# Patient Record
Sex: FEMALE | Race: WHITE | NOT HISPANIC OR LATINO | Employment: FULL TIME | URBAN - METROPOLITAN AREA
[De-identification: names, ages, dates, MRNs, and addresses within clinical notes are randomized per-mention and may not be internally consistent; named-entity substitution may affect disease eponyms.]

---

## 2019-01-10 ENCOUNTER — TELEPHONE (OUTPATIENT)
Dept: SCHEDULING | Facility: IMAGING CENTER | Age: 34
End: 2019-01-10

## 2019-01-13 ENCOUNTER — HOSPITAL ENCOUNTER (OUTPATIENT)
Facility: MEDICAL CENTER | Age: 34
End: 2019-01-13
Attending: PHYSICIAN ASSISTANT
Payer: COMMERCIAL

## 2019-01-13 ENCOUNTER — OFFICE VISIT (OUTPATIENT)
Dept: URGENT CARE | Facility: CLINIC | Age: 34
End: 2019-01-13
Payer: COMMERCIAL

## 2019-01-13 VITALS
HEART RATE: 78 BPM | DIASTOLIC BLOOD PRESSURE: 78 MMHG | RESPIRATION RATE: 20 BRPM | WEIGHT: 145 LBS | BODY MASS INDEX: 23.3 KG/M2 | HEIGHT: 66 IN | SYSTOLIC BLOOD PRESSURE: 120 MMHG | TEMPERATURE: 98.3 F | OXYGEN SATURATION: 96 %

## 2019-01-13 DIAGNOSIS — N76.0 ACUTE VAGINITIS: ICD-10-CM

## 2019-01-13 LAB
APPEARANCE UR: NORMAL
BILIRUB UR STRIP-MCNC: NORMAL MG/DL
COLOR UR AUTO: YELLOW
GLUCOSE UR STRIP.AUTO-MCNC: NORMAL MG/DL
INT CON NEG: NORMAL
INT CON POS: NORMAL
KETONES UR STRIP.AUTO-MCNC: 40 MG/DL
LEUKOCYTE ESTERASE UR QL STRIP.AUTO: NORMAL
NITRITE UR QL STRIP.AUTO: NORMAL
PH UR STRIP.AUTO: 5.5 [PH] (ref 5–8)
POC URINE PREGNANCY TEST: NEGATIVE
PROT UR QL STRIP: NORMAL MG/DL
RBC UR QL AUTO: NORMAL
SP GR UR STRIP.AUTO: 1.02
UROBILINOGEN UR STRIP-MCNC: 0.2 MG/DL

## 2019-01-13 PROCEDURE — 87480 CANDIDA DNA DIR PROBE: CPT

## 2019-01-13 PROCEDURE — 87591 N.GONORRHOEAE DNA AMP PROB: CPT

## 2019-01-13 PROCEDURE — 87491 CHLMYD TRACH DNA AMP PROBE: CPT

## 2019-01-13 PROCEDURE — 81025 URINE PREGNANCY TEST: CPT | Performed by: PHYSICIAN ASSISTANT

## 2019-01-13 PROCEDURE — 87660 TRICHOMONAS VAGIN DIR PROBE: CPT

## 2019-01-13 PROCEDURE — 87510 GARDNER VAG DNA DIR PROBE: CPT

## 2019-01-13 PROCEDURE — 99284 EMERGENCY DEPT VISIT MOD MDM: CPT

## 2019-01-13 PROCEDURE — 81002 URINALYSIS NONAUTO W/O SCOPE: CPT | Performed by: PHYSICIAN ASSISTANT

## 2019-01-13 PROCEDURE — 99204 OFFICE O/P NEW MOD 45 MIN: CPT | Performed by: PHYSICIAN ASSISTANT

## 2019-01-13 ASSESSMENT — ENCOUNTER SYMPTOMS
HEADACHES: 0
CONSTIPATION: 0
CHILLS: 0
ANOREXIA: 0
VAGINITIS: 1
FLANK PAIN: 0
COUGH: 0
NAUSEA: 0
VOMITING: 0
ABDOMINAL PAIN: 0
BACK PAIN: 0
SHORTNESS OF BREATH: 0
FEVER: 0

## 2019-01-14 ENCOUNTER — APPOINTMENT (OUTPATIENT)
Dept: RADIOLOGY | Facility: MEDICAL CENTER | Age: 34
End: 2019-01-14
Attending: EMERGENCY MEDICINE
Payer: COMMERCIAL

## 2019-01-14 ENCOUNTER — HOSPITAL ENCOUNTER (EMERGENCY)
Facility: MEDICAL CENTER | Age: 34
End: 2019-01-14
Attending: EMERGENCY MEDICINE
Payer: COMMERCIAL

## 2019-01-14 VITALS
BODY MASS INDEX: 25.2 KG/M2 | OXYGEN SATURATION: 96 % | WEIGHT: 151.24 LBS | HEIGHT: 65 IN | RESPIRATION RATE: 20 BRPM | DIASTOLIC BLOOD PRESSURE: 74 MMHG | SYSTOLIC BLOOD PRESSURE: 130 MMHG | HEART RATE: 105 BPM | TEMPERATURE: 99.1 F

## 2019-01-14 DIAGNOSIS — N93.9 ABNORMAL VAGINAL BLEEDING: ICD-10-CM

## 2019-01-14 DIAGNOSIS — R10.32 ABDOMINAL PAIN, LLQ (LEFT LOWER QUADRANT): ICD-10-CM

## 2019-01-14 DIAGNOSIS — K59.00 CONSTIPATION, UNSPECIFIED CONSTIPATION TYPE: ICD-10-CM

## 2019-01-14 DIAGNOSIS — N83.201 RIGHT OVARIAN CYST: ICD-10-CM

## 2019-01-14 DIAGNOSIS — N76.0 ACUTE VAGINITIS: ICD-10-CM

## 2019-01-14 PROCEDURE — 76856 US EXAM PELVIC COMPLETE: CPT

## 2019-01-14 PROCEDURE — 700102 HCHG RX REV CODE 250 W/ 637 OVERRIDE(OP): Performed by: EMERGENCY MEDICINE

## 2019-01-14 PROCEDURE — 74018 RADEX ABDOMEN 1 VIEW: CPT

## 2019-01-14 PROCEDURE — A9270 NON-COVERED ITEM OR SERVICE: HCPCS | Performed by: EMERGENCY MEDICINE

## 2019-01-14 RX ORDER — KETOROLAC TROMETHAMINE 10 MG/1
10 TABLET, FILM COATED ORAL 3 TIMES DAILY PRN
Qty: 15 TAB | Refills: 0 | Status: ON HOLD | OUTPATIENT
Start: 2019-01-14 | End: 2022-11-09

## 2019-01-14 RX ORDER — ACETAMINOPHEN 325 MG/1
1000 TABLET ORAL ONCE
Status: COMPLETED | OUTPATIENT
Start: 2019-01-14 | End: 2019-01-14

## 2019-01-14 RX ADMIN — ACETAMINOPHEN 975 MG: 325 TABLET, FILM COATED ORAL at 03:07

## 2019-01-14 RX ADMIN — IBUPROFEN 800 MG: 600 TABLET ORAL at 03:07

## 2019-01-14 ASSESSMENT — PAIN SCALES - GENERAL: PAINLEVEL_OUTOF10: 7

## 2019-01-14 NOTE — PROGRESS NOTES
Subjective:      Brooke Crespo is a 33 y.o. female who presents with Vaginal Discharge (Almost a week vaginal itching and discharge)            Vaginitis   The patient's primary symptoms include genital itching, vaginal bleeding (mild spotting ) and vaginal discharge (yellow ). The patient's pertinent negatives include no genital lesions, genital odor, genital rash, missed menses or pelvic pain. This is a new problem. Episode onset: 1 week  The problem occurs constantly. The problem has been unchanged. The patient is experiencing no pain. She is not pregnant. Associated symptoms include dysuria (mild dysuria ). Pertinent negatives include no abdominal pain, anorexia, back pain, chills, constipation, discolored urine, fever, flank pain, frequency, headaches, hematuria, nausea, painful intercourse, rash, urgency or vomiting. The vaginal discharge was milky and yellow. The vaginal bleeding is spotting. Nothing aggravates the symptoms. She has tried nothing for the symptoms. She is sexually active. No, her partner does not have an STD. She uses nothing for contraception. Her past medical history is significant for vaginosis.     Patient has history of recurrent Yeast infections and UTIs. She states the only medication that has worked in the past for her yeast infections is Butoconazole. She states he has resistance to other creams and Diflucan.    History reviewed. No pertinent past medical history.    History reviewed. No pertinent surgical history.    History reviewed. No pertinent family history.    No Known Allergies    Medications, Allergies, and current problem list reviewed today in Epic      Review of Systems   Constitutional: Negative for chills, fever and malaise/fatigue.   Respiratory: Negative for cough and shortness of breath.    Gastrointestinal: Negative for abdominal pain, anorexia, constipation, nausea and vomiting.   Genitourinary: Positive for dysuria (mild dysuria ) and vaginal discharge  "(yellow ). Negative for flank pain, frequency, hematuria, missed menses, pelvic pain and urgency.        Vaginal discharge, spotting, vaginal itching   Musculoskeletal: Negative for back pain.   Skin: Negative for rash.   Neurological: Negative for headaches.     All other systems reviewed and are negative.        Objective:     /78 (BP Location: Left arm, Patient Position: Sitting, BP Cuff Size: Adult)   Pulse 78   Temp 36.8 °C (98.3 °F) (Temporal)   Resp 20   Ht 1.664 m (5' 5.5\")   Wt 65.8 kg (145 lb)   LMP 12/31/2018 (Exact Date)   SpO2 96%   Breastfeeding? No   BMI 23.76 kg/m²      Physical Exam   Constitutional: She is oriented to person, place, and time. She appears well-developed and well-nourished. No distress.   HENT:   Head: Normocephalic and atraumatic.   Eyes: Conjunctivae are normal.   Pulmonary/Chest: Effort normal. No respiratory distress.   Genitourinary: Uterus normal. There is no rash, tenderness or lesion on the right labia. There is no rash, tenderness or lesion on the left labia. Cervix exhibits discharge and friability (mild friability ). Right adnexum displays no mass, no tenderness and no fullness. Left adnexum displays no mass, no tenderness and no fullness. There is erythema and bleeding (mild spotting ) in the vagina. No tenderness in the vagina. No foreign body in the vagina. Vaginal discharge found.   Neurological: She is alert and oriented to person, place, and time. No cranial nerve deficit.   Skin: Skin is warm and dry. No rash noted.   Psychiatric: She has a normal mood and affect. Her behavior is normal. Judgment and thought content normal.        UA- negative  HCG- negative         Assessment/Plan:     1. Acute vaginitis    - POCT Urinalysis  - POCT Pregnancy  - VAGINAL PATHOGENS DNA PANEL; Future  - CHLAMYDIA/GC PCR URINE OR SWAB; Future    Will check above and change treatment plan accordingly.     Differential diagnoses, Supportive care, and indications for " immediate follow-up discussed with patient.   Instructed to return to clinic or nearest emergency department for any change in condition, further concerns, or worsening of symptoms.    The patient demonstrated a good understanding and agreed with the treatment plan.    Hoa Ye P.A.-C.

## 2019-01-14 NOTE — DISCHARGE INSTRUCTIONS
Call today to schedule an appointment with a gynecologist.  Take the medications as directed with food  Return if uncontrolled bleeding.

## 2019-01-14 NOTE — ED PROVIDER NOTES
"CHIEF COMPLAINT  Chief Complaint   Patient presents with   • Abdominal Pain     Pt c/o L pelvic pain, and red abnormal bleeding, for about 2 weeks.  Pt was seen at urgent care today.   • Vaginal Bleeding       HPI  Brokoe Crespo is a 33 y.o. female who presents tonight with chief complaint of left lower quadrant abdominal pain that she describes as crampy in nature, abnormal vaginal bleeding 1 time per day with bright red blood for the last 2 weeks.  Patient states that she had a normal menstrual cycle at the first of this month that lasted approximately 6 days.  She denies any dysuria, hematuria, fever, chills, nausea, vomiting, diarrhea.  She states she does suffer from chronic constipation.    REVIEW OF SYSTEMS  See HPI for further details. All other system reviews are negative.    PAST MEDICAL HISTORY  History reviewed. No pertinent past medical history.    FAMILY HISTORY  History reviewed. No pertinent family history.    SOCIAL HISTORY  Social History     Social History   • Marital status: Single     Spouse name: N/A   • Number of children: N/A   • Years of education: N/A     Social History Main Topics   • Smoking status: Never Smoker   • Smokeless tobacco: Never Used   • Alcohol use Not on file   • Drug use: Unknown   • Sexual activity: Not on file     Other Topics Concern   • Not on file     Social History Narrative   • No narrative on file       SURGICAL HISTORY  History reviewed. No pertinent surgical history.    CURRENT MEDICATIONS  See nurse's note    ALLERGIES  No Known Allergies    PHYSICAL EXAM  VITAL SIGNS: /74   Pulse 100   Temp 37.3 °C (99.1 °F)   Resp 20   Ht 1.651 m (5' 5\")   Wt 68.6 kg (151 lb 3.8 oz)   LMP 12/31/2018 (Exact Date)   SpO2 98%   BMI 25.17 kg/m²     Constitutional: Patient is well developed, well nourished in moderate distress from her abdominal pain.  HENT: Normocephalic,  Oropharynx moist , nose normal with no mucosal edema.   Eyes: PERRL, EOMI, Conjunctiva " without erythema.   Neck: Supple with Normal range of motion in flexion, extension and lateral rotation. No tenderness along the bony prominences or paraspinal muscles.  Lymphatic: No lymphadenopathy noted.   Cardiovascular: Normal heart rate and rhythm. No murmur.  Thorax & Lungs: Clear and equal breath sounds with good excursion. No respiratory distress.  Abdomen: Bowel sounds normal in all four quadrants. Soft with left lower quadrant tenderness upon palpation, intentional guarding, no rebound, no flank tenderness.  Her abdomen is somewhat distended but no masses are palpated.  Skin: Warm, Dry, No rashes.   Back: No cervical, thoracic, or lumbosacral tenderness.   Extremities: Peripheral pulses 4/4 No edema, No tenderness, normal range of motion.  Neurologic: Alert & oriented x 3, Normal motor function, Normal sensory function.  Psychiatric: Affect normal, Judgment normal, Mood normal.       RADIOLOGY/PROCEDURES  US-PELVIC COMPLETE (TRANSABDOMINAL/TRANSVAGINAL) (COMBO)   Final Result      1.  Small amount of fluid within the lower uterine segment/endocervix.   2.  Minimal free fluid in the cul-de-sac.   3.  Small right ovarian cyst measuring up to 2.3 cm. Free fluid may have arisen from partial cyst rupture.      [id number] VIEW   Final Result      1.  Negative plain film abdomen series.            COURSE & MEDICAL DECISION MAKING  Pertinent Labs & Imaging studies reviewed. (See chart for details)  KUB performed shows increased stool with no obstructive bowel gas pattern.  Pelvic ultrasound was performed to rule out ovarian cyst, patient's urinalysis and pregnancy test and outpatient today were both negative.  She did have vaginal cultures performed with the results of that are not available at this time.  Pelvic ultrasound reveals a small amount of free fluid with a small right ovarian cyst of up to 2.3 cm.  For the free fluid may have come from this cyst.  I have given the patient Motrin and Tylenol here in  the ER and a prescription for Toradol for home.  She is to follow-up with gynecology and was given referral numbers.  She will be discharged in stable and improved condition to return if uncontrolled bleeding or worsening pain.  She is stable.    FINAL IMPRESSION  1.  Left lower quadrant abdominal pain  2.  Constipation  3.  Abnormal vaginal bleeding         Electronically signed by: Yessi Allison, 1/14/2019 1:39 [facility] Provider Note

## 2019-01-14 NOTE — ED TRIAGE NOTES
"Chief Complaint   Patient presents with   • Abdominal Pain     Pt c/o L pelvic pain, and red abnormal bleeding, for about 2 weeks.  Pt was seen at urgent care today.   • Vaginal Bleeding     /74   Pulse 100   Temp 37.3 °C (99.1 °F)   Resp 20   Ht 1.651 m (5' 5\")   Wt 68.6 kg (151 lb 3.8 oz)   LMP 12/31/2018 (Exact Date)   SpO2 98%   BMI 25.17 kg/m²     "

## 2019-01-15 LAB
C TRACH DNA SPEC QL NAA+PROBE: NEGATIVE
CANDIDA DNA VAG QL PROBE+SIG AMP: NEGATIVE
G VAGINALIS DNA VAG QL PROBE+SIG AMP: NEGATIVE
N GONORRHOEA DNA SPEC QL NAA+PROBE: NEGATIVE
SPECIMEN SOURCE: NORMAL
T VAGINALIS DNA VAG QL PROBE+SIG AMP: NEGATIVE

## 2019-01-17 ENCOUNTER — TELEPHONE (OUTPATIENT)
Dept: URGENT CARE | Facility: CLINIC | Age: 34
End: 2019-01-17

## 2019-02-21 ENCOUNTER — APPOINTMENT (OUTPATIENT)
Dept: MEDICAL GROUP | Facility: MEDICAL CENTER | Age: 34
End: 2019-02-21
Payer: COMMERCIAL

## 2019-05-04 ENCOUNTER — APPOINTMENT (OUTPATIENT)
Dept: RADIOLOGY | Facility: MEDICAL CENTER | Age: 34
End: 2019-05-04
Attending: EMERGENCY MEDICINE
Payer: COMMERCIAL

## 2019-05-04 ENCOUNTER — HOSPITAL ENCOUNTER (EMERGENCY)
Facility: MEDICAL CENTER | Age: 34
End: 2019-05-04
Attending: EMERGENCY MEDICINE
Payer: COMMERCIAL

## 2019-05-04 VITALS
SYSTOLIC BLOOD PRESSURE: 135 MMHG | OXYGEN SATURATION: 98 % | HEIGHT: 65 IN | HEART RATE: 90 BPM | WEIGHT: 145 LBS | TEMPERATURE: 97.9 F | RESPIRATION RATE: 18 BRPM | DIASTOLIC BLOOD PRESSURE: 70 MMHG | BODY MASS INDEX: 24.16 KG/M2

## 2019-05-04 DIAGNOSIS — S16.1XXA STRAIN OF NECK MUSCLE, INITIAL ENCOUNTER: ICD-10-CM

## 2019-05-04 DIAGNOSIS — S09.90XA CLOSED HEAD INJURY, INITIAL ENCOUNTER: ICD-10-CM

## 2019-05-04 PROCEDURE — 700102 HCHG RX REV CODE 250 W/ 637 OVERRIDE(OP): Performed by: EMERGENCY MEDICINE

## 2019-05-04 PROCEDURE — 72125 CT NECK SPINE W/O DYE: CPT

## 2019-05-04 PROCEDURE — 99284 EMERGENCY DEPT VISIT MOD MDM: CPT

## 2019-05-04 PROCEDURE — A9270 NON-COVERED ITEM OR SERVICE: HCPCS | Performed by: EMERGENCY MEDICINE

## 2019-05-04 PROCEDURE — 70450 CT HEAD/BRAIN W/O DYE: CPT

## 2019-05-04 RX ORDER — ACETAMINOPHEN 325 MG/1
650 TABLET ORAL ONCE
Status: COMPLETED | OUTPATIENT
Start: 2019-05-04 | End: 2019-05-04

## 2019-05-04 RX ADMIN — ACETAMINOPHEN 650 MG: 325 TABLET, FILM COATED ORAL at 08:09

## 2019-05-04 NOTE — ED NOTES
Pt discharged to home.  Pt given discharge paperwork and all applicable prescriptions written by provider.  Pt to follow with PCP, when indicated.  Pt verbalizes understanding of discharge teaching and will return to ED if condition worsens.
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
## 2019-05-04 NOTE — ED NOTES
RN contacted emergency contact per patient request. Contact is aware and unable to come in at this time.
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
## 2019-08-01 ENCOUNTER — OFFICE VISIT (OUTPATIENT)
Dept: URGENT CARE | Facility: CLINIC | Age: 34
End: 2019-08-01
Payer: COMMERCIAL

## 2019-08-01 VITALS
TEMPERATURE: 98.7 F | HEIGHT: 65 IN | WEIGHT: 145 LBS | RESPIRATION RATE: 16 BRPM | OXYGEN SATURATION: 97 % | BODY MASS INDEX: 24.16 KG/M2 | HEART RATE: 92 BPM | DIASTOLIC BLOOD PRESSURE: 70 MMHG | SYSTOLIC BLOOD PRESSURE: 108 MMHG

## 2019-08-01 DIAGNOSIS — J04.0 LARYNGITIS: ICD-10-CM

## 2019-08-01 DIAGNOSIS — J01.40 SUBACUTE PANSINUSITIS: ICD-10-CM

## 2019-08-01 LAB
INT CON NEG: NORMAL
INT CON POS: NORMAL
S PYO AG THROAT QL: NEGATIVE

## 2019-08-01 PROCEDURE — 87880 STREP A ASSAY W/OPTIC: CPT | Performed by: PHYSICIAN ASSISTANT

## 2019-08-01 PROCEDURE — 99214 OFFICE O/P EST MOD 30 MIN: CPT | Performed by: PHYSICIAN ASSISTANT

## 2019-08-01 RX ORDER — AZITHROMYCIN 250 MG/1
TABLET, FILM COATED ORAL
Qty: 6 TAB | Refills: 0 | Status: SHIPPED | OUTPATIENT
Start: 2019-08-01 | End: 2020-12-30

## 2019-08-01 RX ORDER — PREDNISONE 20 MG/1
20 TABLET ORAL DAILY
Qty: 3 TAB | Refills: 0 | Status: ON HOLD | OUTPATIENT
Start: 2019-08-01 | End: 2022-11-09

## 2019-08-01 ASSESSMENT — ENCOUNTER SYMPTOMS
CHILLS: 0
SORE THROAT: 1
CONSTIPATION: 0
HEADACHES: 1
MYALGIAS: 0
DIZZINESS: 0
HOARSE VOICE: 1
ABDOMINAL PAIN: 0
EYE PAIN: 0
SWOLLEN GLANDS: 1
PALPITATIONS: 0
FEVER: 0
COUGH: 0
SHORTNESS OF BREATH: 0
TROUBLE SWALLOWING: 1
NAUSEA: 0
BLURRED VISION: 0
VOMITING: 0
DIARRHEA: 0

## 2019-08-01 ASSESSMENT — PATIENT HEALTH QUESTIONNAIRE - PHQ9: CLINICAL INTERPRETATION OF PHQ2 SCORE: 0

## 2019-08-02 NOTE — PROGRESS NOTES
Subjective:      Brooke Crespo is a 34 y.o. female who presents with Pharyngitis ( sarted with ear infection, painful, hard time talking, runny nose, x5 days)      Pharyngitis    This is a new problem. The current episode started 1 to 4 weeks ago (Symptoms started just over 1 week ago). The problem has been waxing and waning. The pain is worse on the left side. There has been no fever. The pain is moderate. Associated symptoms include congestion, headaches, a hoarse voice, a plugged ear sensation, swollen glands and trouble swallowing. Pertinent negatives include no abdominal pain, coughing, diarrhea, ear discharge, shortness of breath or vomiting. She has had no exposure to strep or mono. She has tried NSAIDs and acetaminophen (Nasal sprays, oral decongestants) for the symptoms. The treatment provided no relief.       Review of Systems   Constitutional: Positive for malaise/fatigue. Negative for chills and fever.   HENT: Positive for congestion, hoarse voice, sore throat and trouble swallowing. Negative for ear discharge.    Eyes: Negative for blurred vision and pain.   Respiratory: Negative for cough and shortness of breath.    Cardiovascular: Negative for chest pain and palpitations.   Gastrointestinal: Negative for abdominal pain, constipation, diarrhea, nausea and vomiting.   Musculoskeletal: Negative for myalgias.   Skin: Negative for rash.   Neurological: Positive for headaches. Negative for dizziness.   Endo/Heme/Allergies: Positive for environmental allergies.       PMH:  has no past medical history on file.  MEDS:   Current Outpatient Medications:   •  Oxymetazoline HCl (NASAL SPRAY NA), Spray  in nose., Disp: , Rfl:   •  predniSONE (DELTASONE) 20 MG Tab, Take 1 Tab by mouth every day., Disp: 3 Tab, Rfl: 0  •  azithromycin (ZITHROMAX) 250 MG Tab, Take two tabs po day one followed by one tab po day two through five with food, Disp: 6 Tab, Rfl: 0  •  ketorolac (TORADOL) 10 MG Tab, Take 1 Tab by mouth  "3 times a day as needed for Moderate Pain. (Patient not taking: Reported on 8/1/2019), Disp: 15 Tab, Rfl: 0  ALLERGIES: No Known Allergies  SURGHX: No past surgical history on file.  SOCHX:  reports that she has never smoked. She has never used smokeless tobacco. She reports that she drinks alcohol.  FH: Family history was reviewed, no pertinent findings to report     Objective:     /70 (BP Location: Left arm, Patient Position: Sitting, BP Cuff Size: Adult)   Pulse 92   Temp 37.1 °C (98.7 °F) (Temporal)   Resp 16   Ht 1.651 m (5' 5\")   Wt 65.8 kg (145 lb)   SpO2 97%   BMI 24.13 kg/m²      Physical Exam   Constitutional: She is oriented to person, place, and time. She appears well-developed and well-nourished.   HENT:   Head: Normocephalic and atraumatic.   Right Ear: Tympanic membrane, external ear and ear canal normal.   Left Ear: Tympanic membrane, external ear and ear canal normal.   Nose: Mucosal edema and rhinorrhea present.   Mouth/Throat: Uvula is midline and mucous membranes are normal. Posterior oropharyngeal erythema present.   Eyes: Pupils are equal, round, and reactive to light. Conjunctivae are normal.   Neck: Normal range of motion.   Cardiovascular: Normal rate, regular rhythm and normal heart sounds.   No murmur heard.  Pulmonary/Chest: Effort normal and breath sounds normal. No accessory muscle usage. No respiratory distress. She has no decreased breath sounds. She has no wheezes. She has no rhonchi. She has no rales.   Lymphadenopathy:     She has cervical adenopathy.   Neurological: She is alert and oriented to person, place, and time.   Skin: Skin is warm and dry. Capillary refill takes less than 2 seconds.   Psychiatric: She has a normal mood and affect. Her behavior is normal. Judgment normal.   Vitals reviewed.      POCT Rapid Strep A - Negative     Assessment/Plan:     1. Laryngitis  - predniSONE (DELTASONE) 20 MG Tab; Take 1 Tab by mouth every day.  Dispense: 3 Tab; Refill: 0  - " azithromycin (ZITHROMAX) 250 MG Tab; Take two tabs po day one followed by one tab po day two through five with food  Dispense: 6 Tab; Refill: 0  - POCT Rapid Strep A    2. Subacute pansinusitis  - predniSONE (DELTASONE) 20 MG Tab; Take 1 Tab by mouth every day.  Dispense: 3 Tab; Refill: 0          Differential Diagnosis, natural history, and supportive care discussed. Return to the Urgent Care or follow up with your PCP if symptoms fail to resolve, or for any new or worsening symptoms. Emergency room precautions discussed. Patient and/or family appears understanding of information.

## 2019-09-09 ENCOUNTER — OFFICE VISIT (OUTPATIENT)
Dept: URGENT CARE | Facility: CLINIC | Age: 34
End: 2019-09-09
Payer: COMMERCIAL

## 2019-09-09 ENCOUNTER — HOSPITAL ENCOUNTER (OUTPATIENT)
Facility: MEDICAL CENTER | Age: 34
End: 2019-09-09
Attending: NURSE PRACTITIONER
Payer: COMMERCIAL

## 2019-09-09 VITALS
DIASTOLIC BLOOD PRESSURE: 80 MMHG | SYSTOLIC BLOOD PRESSURE: 120 MMHG | TEMPERATURE: 98.2 F | HEIGHT: 65 IN | HEART RATE: 84 BPM | BODY MASS INDEX: 24.13 KG/M2

## 2019-09-09 DIAGNOSIS — Z20.2 POSSIBLE EXPOSURE TO STD: ICD-10-CM

## 2019-09-09 PROCEDURE — 87491 CHLMYD TRACH DNA AMP PROBE: CPT

## 2019-09-09 PROCEDURE — 87660 TRICHOMONAS VAGIN DIR PROBE: CPT

## 2019-09-09 PROCEDURE — 87591 N.GONORRHOEAE DNA AMP PROB: CPT

## 2019-09-09 PROCEDURE — 87510 GARDNER VAG DNA DIR PROBE: CPT

## 2019-09-09 PROCEDURE — 99214 OFFICE O/P EST MOD 30 MIN: CPT | Performed by: NURSE PRACTITIONER

## 2019-09-09 PROCEDURE — 87480 CANDIDA DNA DIR PROBE: CPT

## 2019-09-09 NOTE — PROGRESS NOTES
Subjective:      Brooke Crespo is a 34 y.o. female who presents with Exposure to STD    History reviewed. No pertinent past medical history.  Social History     Socioeconomic History   • Marital status: Single     Spouse name: Not on file   • Number of children: Not on file   • Years of education: Not on file   • Highest education level: Not on file   Occupational History   • Not on file   Social Needs   • Financial resource strain: Not on file   • Food insecurity:     Worry: Not on file     Inability: Not on file   • Transportation needs:     Medical: Not on file     Non-medical: Not on file   Tobacco Use   • Smoking status: Never Smoker   • Smokeless tobacco: Never Used   Substance and Sexual Activity   • Alcohol use: Yes     Comment: soc   • Drug use: Not on file   • Sexual activity: Not on file   Lifestyle   • Physical activity:     Days per week: Not on file     Minutes per session: Not on file   • Stress: Not on file   Relationships   • Social connections:     Talks on phone: Not on file     Gets together: Not on file     Attends Latter day service: Not on file     Active member of club or organization: Not on file     Attends meetings of clubs or organizations: Not on file     Relationship status: Not on file   • Intimate partner violence:     Fear of current or ex partner: Not on file     Emotionally abused: Not on file     Physically abused: Not on file     Forced sexual activity: Not on file   Other Topics Concern   • Not on file   Social History Narrative   • Not on file     History reviewed. No pertinent family history.    Allergies: Patient has no known allergies.    Patient is 34-year-old female who presents with request for testing for STDs.  No known exposure, states she is not having symptoms at this time.        Exposure to STD   This is a new problem. The problem occurs constantly. The problem has been unchanged. Nothing aggravates the symptoms. She has tried nothing for the symptoms. The  "treatment provided no relief.       Review of Systems   All other systems reviewed and are negative.         Objective:     /80   Pulse 84   Temp 36.8 °C (98.2 °F) (Temporal)   Ht 1.651 m (5' 5\")   BMI 24.13 kg/m²      Physical Exam   Constitutional: She is oriented to person, place, and time. She appears well-developed and well-nourished.   Cardiovascular: Normal rate and regular rhythm.   Abdominal: There is no tenderness.   Musculoskeletal: Normal range of motion.   Neurological: She is alert and oriented to person, place, and time.   Skin: Skin is warm and dry.   Psychiatric: She has a normal mood and affect. Her behavior is normal. Judgment and thought content normal.   Vitals reviewed.              Assessment/Plan:     1. Possible exposure to STD    Culture was obtained for gonorrhea and chlamydia  Vaginal pathogens obtained  Lab orders given for RPR, HIV, and HSV  We will notify upon receiving results  Call or return otherwise for any further questions or concerns    "

## 2019-09-10 ENCOUNTER — HOSPITAL ENCOUNTER (OUTPATIENT)
Dept: LAB | Facility: MEDICAL CENTER | Age: 34
End: 2019-09-10
Attending: NURSE PRACTITIONER
Payer: COMMERCIAL

## 2019-09-10 DIAGNOSIS — Z20.2 POSSIBLE EXPOSURE TO STD: ICD-10-CM

## 2019-09-10 LAB
C TRACH DNA SPEC QL NAA+PROBE: NEGATIVE
CANDIDA DNA VAG QL PROBE+SIG AMP: NEGATIVE
G VAGINALIS DNA VAG QL PROBE+SIG AMP: POSITIVE
HIV 1+2 AB+HIV1 P24 AG SERPL QL IA: NON REACTIVE
N GONORRHOEA DNA SPEC QL NAA+PROBE: NEGATIVE
SPECIMEN SOURCE: NORMAL
T VAGINALIS DNA VAG QL PROBE+SIG AMP: NEGATIVE
TREPONEMA PALLIDUM IGG+IGM AB [PRESENCE] IN SERUM OR PLASMA BY IMMUNOASSAY: NON REACTIVE

## 2019-09-10 PROCEDURE — 86780 TREPONEMA PALLIDUM: CPT

## 2019-09-10 PROCEDURE — 87389 HIV-1 AG W/HIV-1&-2 AB AG IA: CPT

## 2019-09-10 PROCEDURE — 86696 HERPES SIMPLEX TYPE 2 TEST: CPT

## 2019-09-10 PROCEDURE — 36415 COLL VENOUS BLD VENIPUNCTURE: CPT

## 2019-09-10 PROCEDURE — 86695 HERPES SIMPLEX TYPE 1 TEST: CPT

## 2019-09-10 PROCEDURE — 86694 HERPES SIMPLEX NES ANTBDY: CPT

## 2019-09-11 ENCOUNTER — TELEPHONE (OUTPATIENT)
Dept: URGENT CARE | Facility: CLINIC | Age: 34
End: 2019-09-11

## 2019-09-11 DIAGNOSIS — N76.0 BV (BACTERIAL VAGINOSIS): ICD-10-CM

## 2019-09-11 DIAGNOSIS — B96.89 BV (BACTERIAL VAGINOSIS): ICD-10-CM

## 2019-09-11 RX ORDER — METRONIDAZOLE 7.5 MG/G
1 GEL VAGINAL
Qty: 1 TUBE | Refills: 0 | Status: SHIPPED | OUTPATIENT
Start: 2019-09-11 | End: 2019-09-16

## 2019-09-11 NOTE — TELEPHONE ENCOUNTER
Attempted to notify patient by phone of lab results.  No answer.  Left detailed voice message with results.  Vaginal pathogens was positive for bacterial vaginosis.  HIV, RPR, gonorrhea/chlamydia were all negative.  Advised patient also that HSV is still pending.  Requested callback for any further questions or concerns.  Advised patient that I will send a prescription for metronidazole gel to her pharmacy.  Patient was also sent a message via [employer].

## 2019-09-12 LAB
HSV1 GG IGG SER-ACNC: 48.2 IV
HSV1+2 IGG SER IA-ACNC: >22.4 IV
HSV2 GG IGG SER-ACNC: 0.17 IV

## 2019-12-21 ENCOUNTER — OFFICE VISIT (OUTPATIENT)
Dept: URGENT CARE | Facility: CLINIC | Age: 34
End: 2019-12-21
Payer: COMMERCIAL

## 2019-12-21 VITALS
DIASTOLIC BLOOD PRESSURE: 60 MMHG | BODY MASS INDEX: 24.16 KG/M2 | WEIGHT: 145 LBS | HEART RATE: 104 BPM | TEMPERATURE: 98.4 F | OXYGEN SATURATION: 96 % | SYSTOLIC BLOOD PRESSURE: 110 MMHG | RESPIRATION RATE: 16 BRPM | HEIGHT: 65 IN

## 2019-12-21 DIAGNOSIS — R30.0 DYSURIA: ICD-10-CM

## 2019-12-21 DIAGNOSIS — J06.9 URI WITH COUGH AND CONGESTION: ICD-10-CM

## 2019-12-21 LAB
APPEARANCE UR: NORMAL
BILIRUB UR STRIP-MCNC: NORMAL MG/DL
COLOR UR AUTO: YELLOW
FLUAV+FLUBV AG SPEC QL IA: NEGATIVE
GLUCOSE UR STRIP.AUTO-MCNC: NORMAL MG/DL
INT CON NEG: NORMAL
INT CON POS: NORMAL
KETONES UR STRIP.AUTO-MCNC: NORMAL MG/DL
LEUKOCYTE ESTERASE UR QL STRIP.AUTO: NORMAL
NITRITE UR QL STRIP.AUTO: NORMAL
PH UR STRIP.AUTO: 8 [PH] (ref 5–8)
PROT UR QL STRIP: NORMAL MG/DL
RBC UR QL AUTO: NORMAL
SP GR UR STRIP.AUTO: 1.02
UROBILINOGEN UR STRIP-MCNC: 0.2 MG/DL

## 2019-12-21 PROCEDURE — 81002 URINALYSIS NONAUTO W/O SCOPE: CPT | Performed by: PHYSICIAN ASSISTANT

## 2019-12-21 PROCEDURE — 87804 INFLUENZA ASSAY W/OPTIC: CPT | Performed by: PHYSICIAN ASSISTANT

## 2019-12-21 PROCEDURE — 99214 OFFICE O/P EST MOD 30 MIN: CPT | Performed by: PHYSICIAN ASSISTANT

## 2019-12-21 RX ORDER — BENZONATATE 100 MG/1
100 CAPSULE ORAL 3 TIMES DAILY PRN
Qty: 30 CAP | Refills: 0 | Status: ON HOLD | OUTPATIENT
Start: 2019-12-21 | End: 2022-11-09

## 2019-12-21 RX ORDER — AZITHROMYCIN 250 MG/1
TABLET, FILM COATED ORAL
Qty: 6 TAB | Refills: 0 | Status: SHIPPED | OUTPATIENT
Start: 2019-12-21 | End: 2020-12-30

## 2019-12-21 ASSESSMENT — ENCOUNTER SYMPTOMS
SPUTUM PRODUCTION: 1
FLANK PAIN: 0
SINUS PAIN: 1
NAUSEA: 0
EYE PAIN: 0
DIZZINESS: 0
HEADACHES: 1
EYE REDNESS: 0
VOMITING: 0
SHORTNESS OF BREATH: 0
SORE THROAT: 1
ABDOMINAL PAIN: 0
CHILLS: 0
EYE DISCHARGE: 0
WHEEZING: 0
COUGH: 1
FEVER: 0
DIARRHEA: 1
MYALGIAS: 1

## 2019-12-21 NOTE — LETTER
December 21, 2019         Patient: Brooke Crespo   YOB: 1985   Date of Visit: 12/21/2019           To Whom it May Concern:    Brooke Crespo was seen in my clinic on 12/21/2019. Please excuse her from work today.     If you have any questions or concerns, please don't hesitate to call.        Sincerely,           Alicia Rodriguez P.A.-C.  Electronically Signed

## 2019-12-22 NOTE — PROGRESS NOTES
Subjective:      Brooke Crespo is a 34 y.o. female who presents with Sore Throat (x3 days); Sinus Problem; Urinary Frequency (x2 days); and Dysuria            HPI  34-year-old female presents to urgent care with new problem of sore throat, sinus congestion, productive cough and generalized fatigue onset about 4 days ago. Patient has not tried any OTC medications for her symptoms.   Sick contacts at work.   Patient also reports urinary frequency and mild dysuria onset 2 days ago.  She denies hematuria, urinary urgency, flank pain, abdominal pain, vomiting, or fevers.  Denies other associated aggravating or alleviating factors.     Review of Systems   Constitutional: Positive for malaise/fatigue. Negative for chills and fever.   HENT: Positive for congestion, sinus pain and sore throat.    Eyes: Negative for pain, discharge and redness.   Respiratory: Positive for cough and sputum production. Negative for shortness of breath and wheezing.    Cardiovascular: Negative for chest pain.        Chest soreness secondary to cough    Gastrointestinal: Positive for diarrhea. Negative for abdominal pain, nausea and vomiting.   Genitourinary: Positive for dysuria, frequency and urgency. Negative for flank pain and hematuria.   Musculoskeletal: Positive for myalgias.   Skin: Negative for rash.   Neurological: Positive for headaches. Negative for dizziness.   Endo/Heme/Allergies: Negative for environmental allergies.       History reviewed. No pertinent past medical history.  Current Outpatient Medications on File Prior to Visit   Medication Sig Dispense Refill   • clindamycin (CLEOCIN) 2 % vaginal cream Apply once at night for 7 days. 1 Tube 0   • Oxymetazoline HCl (NASAL SPRAY NA) Spray  in nose.     • predniSONE (DELTASONE) 20 MG Tab Take 1 Tab by mouth every day. 3 Tab 0   • azithromycin (ZITHROMAX) 250 MG Tab Take two tabs po day one followed by one tab po day two through five with food 6 Tab 0   • ketorolac (TORADOL)  "10 MG Tab Take 1 Tab by mouth 3 times a day as needed for Moderate Pain. (Patient not taking: Reported on 8/1/2019) 15 Tab 0     No current facility-administered medications on file prior to visit.      Patient has no known allergies.  Social History     Tobacco Use   • Smoking status: Never Smoker   • Smokeless tobacco: Never Used   Substance Use Topics   • Alcohol use: Yes     Comment: soc      Objective:     /60 (BP Location: Right arm, Patient Position: Sitting, BP Cuff Size: Adult)   Pulse (!) 104   Temp 36.9 °C (98.4 °F) (Temporal)   Resp 16   Ht 1.651 m (5' 5\")   Wt 65.8 kg (145 lb)   LMP 12/04/2019 (Exact Date)   SpO2 96%   Breastfeeding? No   BMI 24.13 kg/m²      Physical Exam  Vitals signs reviewed.   Constitutional:       General: She is not in acute distress.     Appearance: Normal appearance. She is well-developed. She is not ill-appearing.   HENT:      Head: Normocephalic and atraumatic.      Right Ear: Tympanic membrane normal.      Left Ear: Tympanic membrane normal.      Nose: Mucosal edema and congestion present. No rhinorrhea.      Mouth/Throat:      Mouth: Mucous membranes are moist.      Pharynx: Posterior oropharyngeal erythema present. No oropharyngeal exudate.   Eyes:      Extraocular Movements: Extraocular movements intact.      Conjunctiva/sclera: Conjunctivae normal.   Neck:      Musculoskeletal: Normal range of motion and neck supple.   Cardiovascular:      Rate and Rhythm: Normal rate and regular rhythm.      Heart sounds: Normal heart sounds.   Pulmonary:      Effort: Pulmonary effort is normal. No respiratory distress.      Breath sounds: Normal breath sounds. No wheezing or rhonchi.   Abdominal:      General: Bowel sounds are normal.      Palpations: Abdomen is soft.      Tenderness: There is no tenderness. There is no right CVA tenderness or left CVA tenderness.   Musculoskeletal: Normal range of motion.   Skin:     General: Skin is warm and dry.      Findings: No " rash.   Neurological:      General: No focal deficit present.      Mental Status: She is alert and oriented to person, place, and time.   Psychiatric:         Mood and Affect: Mood normal.         Behavior: Behavior normal.         Thought Content: Thought content normal.         Judgment: Judgment normal.                 Assessment/Plan:     1. Dysuria  POCT Urinalysis   2. URI with cough and congestion  POCT Influenza A/B    benzonatate (TESSALON) 100 MG Cap    azithromycin (ZITHROMAX) 250 MG Tab     Results for orders placed or performed in visit on 12/21/19   POCT Urinalysis   Result Value Ref Range    POC Color YELLOW Negative    POC Appearance CLOUDY Negative    POC Leukocyte Esterase NEG Negative    POC Nitrites NEG Negative    POC Urobiligen 0.2 Negative (0.2) mg/dL    POC Protein NEG Negative mg/dL    POC Urine PH 8.0 5.0 - 8.0    POC Blood NEG Negative    POC Specific Gravity 1.020 <1.005 - >1.030    POC Ketones NEG Negative mg/dL    POC Bilirubin NEG Negative mg/dL    POC Glucose NEG Negative mg/dL   POCT Influenza A/B   Result Value Ref Range    Rapid Influenza A-B NEGATIVE     Internal Control Positive Valid     Internal Control Negative Valid    Recommend continued use of over-the-counter cold and cough medications for symptomatic relief.  Tylenol and/or Motrin for development of fevers or body aches.  PT should follow up with PCP in 1-2 days for re-evaluation if symptoms have not improved.  Discussed red flags and reasons to return to UC or ED.  Pt and/or family verbalized understanding of diagnosis and follow up instructions and was offered informational handout on diagnosis.  PT discharged.

## 2020-12-30 ENCOUNTER — OFFICE VISIT (OUTPATIENT)
Dept: URGENT CARE | Facility: CLINIC | Age: 35
End: 2020-12-30
Payer: COMMERCIAL

## 2020-12-30 ENCOUNTER — HOSPITAL ENCOUNTER (OUTPATIENT)
Facility: MEDICAL CENTER | Age: 35
End: 2020-12-30
Attending: NURSE PRACTITIONER
Payer: COMMERCIAL

## 2020-12-30 VITALS
SYSTOLIC BLOOD PRESSURE: 110 MMHG | WEIGHT: 146 LBS | OXYGEN SATURATION: 97 % | HEART RATE: 93 BPM | HEIGHT: 65 IN | RESPIRATION RATE: 14 BRPM | TEMPERATURE: 98 F | DIASTOLIC BLOOD PRESSURE: 70 MMHG | BODY MASS INDEX: 24.32 KG/M2

## 2020-12-30 DIAGNOSIS — R10.9 FLANK PAIN: ICD-10-CM

## 2020-12-30 DIAGNOSIS — N12 PYELONEPHRITIS: ICD-10-CM

## 2020-12-30 LAB
APPEARANCE UR: NORMAL
BILIRUB UR STRIP-MCNC: NORMAL MG/DL
COLOR UR AUTO: NORMAL
GLUCOSE UR STRIP.AUTO-MCNC: 100 MG/DL
INT CON NEG: NORMAL
INT CON POS: NORMAL
KETONES UR STRIP.AUTO-MCNC: 15 MG/DL
LEUKOCYTE ESTERASE UR QL STRIP.AUTO: NEGATIVE
NITRITE UR QL STRIP.AUTO: POSITIVE
PH UR STRIP.AUTO: 5.5 [PH] (ref 5–8)
POC URINE PREGNANCY TEST: NEGATIVE
PROT UR QL STRIP: 30 MG/DL
RBC UR QL AUTO: NEGATIVE
SP GR UR STRIP.AUTO: 1.03
UROBILINOGEN UR STRIP-MCNC: 1 MG/DL

## 2020-12-30 PROCEDURE — 99214 OFFICE O/P EST MOD 30 MIN: CPT | Mod: 25 | Performed by: NURSE PRACTITIONER

## 2020-12-30 PROCEDURE — 81025 URINE PREGNANCY TEST: CPT | Performed by: NURSE PRACTITIONER

## 2020-12-30 PROCEDURE — 81002 URINALYSIS NONAUTO W/O SCOPE: CPT | Performed by: NURSE PRACTITIONER

## 2020-12-30 PROCEDURE — 87086 URINE CULTURE/COLONY COUNT: CPT

## 2020-12-30 RX ORDER — SULFAMETHOXAZOLE AND TRIMETHOPRIM 800; 160 MG/1; MG/1
1 TABLET ORAL EVERY 12 HOURS
Qty: 28 TAB | Refills: 0 | Status: SHIPPED | OUTPATIENT
Start: 2020-12-30 | End: 2020-12-30 | Stop reason: SDUPTHER

## 2020-12-30 RX ORDER — SULFAMETHOXAZOLE AND TRIMETHOPRIM 800; 160 MG/1; MG/1
1 TABLET ORAL EVERY 12 HOURS
Qty: 28 TAB | Refills: 0 | Status: SHIPPED | OUTPATIENT
Start: 2020-12-30 | End: 2021-01-13

## 2020-12-30 ASSESSMENT — ENCOUNTER SYMPTOMS
CHILLS: 0
ABDOMINAL PAIN: 1
DIZZINESS: 0
COUGH: 0
FATIGUE: 1
FEVER: 0
SHORTNESS OF BREATH: 0
NAUSEA: 0
FLANK PAIN: 1
VOMITING: 0
SORE THROAT: 0
MYALGIAS: 0
EYE REDNESS: 0

## 2020-12-31 DIAGNOSIS — N12 PYELONEPHRITIS: ICD-10-CM

## 2020-12-31 NOTE — PROGRESS NOTES
Subjective:   Brooke Crespo is a 35 y.o. female who presents for UTI (frequency, painful urination, pain radiating in abdomin and lower back, red irriated skin, has had a ruptured cyst in the past. Has been on two sets of antibiotics)      UTI  This is a new problem. The current episode started in the past 7 days. The problem occurs constantly. The problem has been gradually worsening. Associated symptoms include abdominal pain, fatigue and urinary symptoms. Pertinent negatives include no chest pain, chills, congestion, coughing, fever, myalgias, nausea, rash, sore throat or vomiting. Associated symptoms comments: Flank pain  . Nothing aggravates the symptoms. She has tried acetaminophen (macrobid x5 days) for the symptoms. The treatment provided no relief.       Review of Systems   Constitutional: Positive for fatigue and malaise/fatigue. Negative for chills and fever.   HENT: Negative for congestion and sore throat.    Eyes: Negative for redness.   Respiratory: Negative for cough and shortness of breath.    Cardiovascular: Negative for chest pain.   Gastrointestinal: Positive for abdominal pain. Negative for nausea and vomiting.   Genitourinary: Positive for dysuria and flank pain. Negative for frequency, hematuria and urgency.   Musculoskeletal: Negative for myalgias.   Skin: Negative for rash.   Neurological: Negative for dizziness.       Medications:    • benzonatate Caps  • clindamycin  • ketorolac Tabs  • NASAL SPRAY NA  • predniSONE Tabs  • sulfamethoxazole-trimethoprim    Allergies: Patient has no known allergies.    Problem List: Brooke Cresop does not have a problem list on file.    Surgical History:  Past Surgical History:   Procedure Laterality Date   • LUMPECTOMY     • SEPTAL RECONSTRUCTION         Past Social Hx: Brooke Crespo  reports that she has never smoked. She has never used smokeless tobacco. She reports current alcohol use.     Past Family Hx:  Brooke Crespo family  "history is not on file.     Problem list, medications, and allergies reviewed by myself today in Epic.     Objective:     /70   Pulse 93   Temp 36.7 °C (98 °F) (Temporal)   Resp 14   Ht 1.651 m (5' 5\")   Wt 66.2 kg (146 lb)   SpO2 97%   BMI 24.30 kg/m²     Physical Exam  Vitals signs and nursing note reviewed.   Constitutional:       General: She is not in acute distress.     Appearance: She is well-developed.   HENT:      Head: Normocephalic and atraumatic.      Right Ear: External ear normal.      Left Ear: External ear normal.      Nose: Nose normal.      Mouth/Throat:      Mouth: Mucous membranes are moist.   Eyes:      Conjunctiva/sclera: Conjunctivae normal.   Cardiovascular:      Rate and Rhythm: Normal rate.   Pulmonary:      Effort: Pulmonary effort is normal. No respiratory distress.      Breath sounds: Normal breath sounds.   Abdominal:      General: Bowel sounds are normal. There is no distension.      Tenderness: There is no abdominal tenderness. There is right CVA tenderness and left CVA tenderness.   Musculoskeletal: Normal range of motion.   Skin:     General: Skin is warm and dry.   Neurological:      General: No focal deficit present.      Mental Status: She is alert and oriented to person, place, and time. Mental status is at baseline.      Gait: Gait (gait at baseline) normal.   Psychiatric:         Judgment: Judgment normal.         Assessment/Plan:     Diagnosis and associated orders:     1. Pyelonephritis  POCT Urinalysis    POCT PREGNANCY    Urine Culture    REFERRAL TO UROLOGY    cefTRIAXone (ROCEPHIN) 1 g, lidocaine (XYLOCAINE) 1 % 3.6 mL for IM use    sulfamethoxazole-trimethoprim (BACTRIM DS) 800-160 MG tablet    DISCONTINUED: sulfamethoxazole-trimethoprim (BACTRIM DS) 800-160 MG tablet   2. Flank pain  CT-RENAL COLIC EVALUATION(A/P W/O)      Comments/MDM:     Results for orders placed or performed in visit on 12/30/20   POCT Urinalysis   Result Value Ref Range    POC Color " Red Negative    POC Appearance Cloudy Negative    POC Leukocyte Esterase Negative Negative    POC Nitrites Positive Negative    POC Urobiligen 1.0 Negative (0.2) mg/dL    POC Protein 30 Negative mg/dL    POC Urine PH 5.5 5.0 - 8.0    POC Blood Negative Negative    POC Specific Gravity 1.030 <1.005 - >1.030    POC Ketones 15 Negative mg/dL    POC Bilirubin Small Negative mg/dL    POC Glucose 100 Negative mg/dL   POCT PREGNANCY   Result Value Ref Range    POC Urine Pregnancy Test Negative Negative    Internal Control Positive Valid     Internal Control Negative Valid  •     •   Patient is a 35-year-old female present with the stated above.  Patient vital signs stable, patient without a fever, no signs of sepsis at this time.  Patient does have bilateral flank tenderness I am concerned of an infection however will obtain diagnostic CT to ensure no kidney stone present.  Due to the time and location CT is unavailable.  Patient will be scheduled in the morning for CT scan for follow-up we will follow-up with pending results and treat as indicated.  Patient will be started on antibiotic therapy today.  Will send urine for culture to ensure no resistance.Differential diagnosis, natural history, supportive care, and indications for immediate follow-up discussed.        Please note that this dictation was created using voice recognition software. I have made a reasonable attempt to correct obvious errors, but I expect that there are errors of grammar and possibly content that I did not discover before finalizing the note.    This note was electronically signed by Harry ABDUL.

## 2021-01-03 ENCOUNTER — OFFICE VISIT (OUTPATIENT)
Dept: URGENT CARE | Facility: CLINIC | Age: 36
End: 2021-01-03
Payer: COMMERCIAL

## 2021-01-03 ENCOUNTER — HOSPITAL ENCOUNTER (OUTPATIENT)
Facility: MEDICAL CENTER | Age: 36
End: 2021-01-03
Attending: NURSE PRACTITIONER
Payer: COMMERCIAL

## 2021-01-03 VITALS
OXYGEN SATURATION: 98 % | HEART RATE: 93 BPM | DIASTOLIC BLOOD PRESSURE: 86 MMHG | RESPIRATION RATE: 16 BRPM | SYSTOLIC BLOOD PRESSURE: 122 MMHG | TEMPERATURE: 98.5 F

## 2021-01-03 DIAGNOSIS — R39.9 LOWER URINARY TRACT SYMPTOMS (LUTS): ICD-10-CM

## 2021-01-03 DIAGNOSIS — Z11.3 SCREEN FOR STD (SEXUALLY TRANSMITTED DISEASE): ICD-10-CM

## 2021-01-03 DIAGNOSIS — N94.9 VAGINAL BURNING: ICD-10-CM

## 2021-01-03 LAB
APPEARANCE UR: CLEAR
BACTERIA UR CULT: NORMAL
BILIRUB UR STRIP-MCNC: NEGATIVE MG/DL
COLOR UR AUTO: NORMAL
GLUCOSE UR STRIP.AUTO-MCNC: NEGATIVE MG/DL
KETONES UR STRIP.AUTO-MCNC: NEGATIVE MG/DL
LEUKOCYTE ESTERASE UR QL STRIP.AUTO: NEGATIVE
NITRITE UR QL STRIP.AUTO: POSITIVE
PH UR STRIP.AUTO: 5.5 [PH] (ref 5–8)
PROT UR QL STRIP: NEGATIVE MG/DL
RBC UR QL AUTO: NORMAL
SIGNIFICANT IND 70042: NORMAL
SITE SITE: NORMAL
SOURCE SOURCE: NORMAL
SP GR UR STRIP.AUTO: 1.03
UROBILINOGEN UR STRIP-MCNC: 0.2 MG/DL

## 2021-01-03 PROCEDURE — 87480 CANDIDA DNA DIR PROBE: CPT

## 2021-01-03 PROCEDURE — 81002 URINALYSIS NONAUTO W/O SCOPE: CPT | Performed by: NURSE PRACTITIONER

## 2021-01-03 PROCEDURE — 87529 HSV DNA AMP PROBE: CPT | Mod: 91

## 2021-01-03 PROCEDURE — 87086 URINE CULTURE/COLONY COUNT: CPT

## 2021-01-03 PROCEDURE — 87660 TRICHOMONAS VAGIN DIR PROBE: CPT

## 2021-01-03 PROCEDURE — 87510 GARDNER VAG DNA DIR PROBE: CPT

## 2021-01-03 PROCEDURE — 99214 OFFICE O/P EST MOD 30 MIN: CPT | Performed by: NURSE PRACTITIONER

## 2021-01-03 RX ORDER — PHENAZOPYRIDINE HYDROCHLORIDE 100 MG/1
100 TABLET, FILM COATED ORAL 3 TIMES DAILY
Status: ON HOLD | COMMUNITY
Start: 2020-12-14 | End: 2022-11-09

## 2021-01-03 RX ORDER — FLUCONAZOLE 150 MG/1
150 TABLET ORAL DAILY
Qty: 1 TAB | Refills: 1 | Status: ON HOLD | OUTPATIENT
Start: 2021-01-03 | End: 2022-11-09

## 2021-01-03 RX ORDER — NITROFURANTOIN 25; 75 MG/1; MG/1
100 CAPSULE ORAL
Status: ON HOLD | COMMUNITY
Start: 2020-12-14 | End: 2022-11-09

## 2021-01-03 RX ORDER — VALACYCLOVIR HYDROCHLORIDE 1 G/1
1000 TABLET, FILM COATED ORAL 2 TIMES DAILY
Qty: 20 TAB | Refills: 0 | Status: ON HOLD | OUTPATIENT
Start: 2021-01-03 | End: 2022-11-09

## 2021-01-03 RX ORDER — FLUCONAZOLE 150 MG/1
TABLET ORAL
Status: ON HOLD | COMMUNITY
Start: 2020-12-31 | End: 2022-11-09

## 2021-01-03 ASSESSMENT — ENCOUNTER SYMPTOMS
ABDOMINAL PAIN: 0
NAUSEA: 0
FEVER: 0
BACK PAIN: 1
CHILLS: 0

## 2021-01-03 NOTE — PROGRESS NOTES
Subjective:      Brooke Crespo is a 35 y.o. female who presents with Urinary Pain (not feeling better since last visit, burning on skin around vaginal area moslty left side)            HPI Recurrent. 35 year old female with urinary pain, vaginal burning for the past 2 weeks. She has been seen twice with negative urine culture from the 30th; none done in first visit. She still reports back pain as well. Denies fever, chills, discharge frequency or urgency. She denies any itching or known lesions. She is sexually active with one partner x 6 months without condom use. She did have positive HSV 1 test result in 2019.     [place]  Current Outpatient Medications on File Prior to Visit   Medication Sig Dispense Refill   • nitrofurantoin (MACROBID) 100 MG Cap Take 100 mg by mouth.     • phenazopyridine (PYRIDIUM) 100 MG Tab Take 100 mg by mouth 3 times a day.     • fluconazole (DIFLUCAN) 150 MG tablet TAKE 1 TABLET BY MOUTH NOW REPEAT DOSE IN 3 DAYS     • sulfamethoxazole-trimethoprim (BACTRIM DS) 800-160 MG tablet Take 1 Tab by mouth every 12 hours for 14 days. (Patient not taking: Reported on 1/3/2021) 28 Tab 0   • benzonatate (TESSALON) 100 MG Cap Take 1 Cap by mouth 3 times a day as needed for Cough. (Patient not taking: Reported on 1/3/2021) 30 Cap 0   • clindamycin (CLEOCIN) 2 % vaginal cream Apply once at night for 7 days. (Patient not taking: Reported on 1/3/2021) 1 Tube 0   • Oxymetazoline HCl (NASAL SPRAY NA) Spray  in nose.     • predniSONE (DELTASONE) 20 MG Tab Take 1 Tab by mouth every day. (Patient not taking: Reported on 1/3/2021) 3 Tab 0   • ketorolac (TORADOL) 10 MG Tab Take 1 Tab by mouth 3 times a day as needed for Moderate Pain. (Patient not taking: Reported on 1/3/2021) 15 Tab 0     No current facility-administered medications on file prior to visit.      Social History     Socioeconomic History   • Marital status: Single     Spouse name: Not on file   • Number of children: Not on file   •  Years of education: Not on file   • Highest education level: Not on file   Occupational History   • Not on file   Social Needs   • Financial resource strain: Not on file   • Food insecurity     Worry: Not on file     Inability: Not on file   • Transportation needs     Medical: Not on file     Non-medical: Not on file   Tobacco Use   • Smoking status: Never Smoker   • Smokeless tobacco: Never Used   Substance and Sexual Activity   • Alcohol use: Yes     Comment: soc   • Drug use: Not on file   • Sexual activity: Not on file   Lifestyle   • Physical activity     Days per week: Not on file     Minutes per session: Not on file   • Stress: Not on file   Relationships   • Social connections     Talks on phone: Not on file     Gets together: Not on file     Attends Shinto service: Not on file     Active member of club or organization: Not on file     Attends meetings of clubs or organizations: Not on file     Relationship status: Not on file   • Intimate partner violence     Fear of current or ex partner: Not on file     Emotionally abused: Not on file     Physically abused: Not on file     Forced sexual activity: Not on file   Other Topics Concern   • Not on file   Social History Narrative   • Not on file     Breast Cancer-related family history is not on file.      Review of Systems   Constitutional: Negative for chills, fever and malaise/fatigue.   Gastrointestinal: Negative for abdominal pain and nausea.   Genitourinary: Positive for dysuria. Negative for frequency, hematuria and urgency.        Negative for vaginal discharge.   Musculoskeletal: Positive for back pain.          Objective:     /86 (BP Location: Left arm, Patient Position: Sitting, BP Cuff Size: Adult)   Pulse 93   Temp 36.9 °C (98.5 °F) (Temporal)   Resp 16   SpO2 98%      Physical Exam  Constitutional:       Appearance: Normal appearance.   Cardiovascular:      Rate and Rhythm: Normal rate and regular rhythm.      Heart sounds: No murmur.    Pulmonary:      Effort: Pulmonary effort is normal.      Breath sounds: Normal breath sounds.   Genitourinary:     Exam position: Lithotomy position.      Labia:         Left: Lesion present.       Vagina: Vaginal discharge present.          Comments: Multiple tiny lesions inside left labia majora  Musculoskeletal: Normal range of motion.   Skin:     General: Skin is warm and dry.   Neurological:      General: No focal deficit present.      Mental Status: She is alert and oriented to person, place, and time.   Psychiatric:         Mood and Affect: Mood normal.                 Assessment/Plan:        1. Vaginal burning  HERPES VIRAL CULTURE    valacyclovir (VALTREX) 1 GM Tab   2. Lower urinary tract symptoms (LUTS)  POCT Urinalysis    VAGINAL PATHOGENS DNA PANEL    URINE CULTURE(NEW)   3. Screen for STD (sexually transmitted disease)  HEPATITIS PANEL ACUTE(4 COMPONENTS)    RPR    HIV AG/AB COMBO ASSAY DIAGNOSTIC    HSV 1/2 IGG W/ TYPE SPECIFIC RFLX     Reviewed possible diagnosis with patient with reassurance given.  Labs  Will call with results and start valtrex.  Urine culture as she is persistently positive for nitrites.  Differential diagnosis, natural history, supportive care, and indications for immediate follow-up discussed at length.

## 2021-01-04 DIAGNOSIS — R39.9 LOWER URINARY TRACT SYMPTOMS (LUTS): ICD-10-CM

## 2021-01-04 LAB
CANDIDA DNA VAG QL PROBE+SIG AMP: NEGATIVE
G VAGINALIS DNA VAG QL PROBE+SIG AMP: NEGATIVE
T VAGINALIS DNA VAG QL PROBE+SIG AMP: NEGATIVE

## 2021-01-06 LAB
BACTERIA UR CULT: NORMAL
HSV1 DNA SPEC QL NAA+PROBE: NEGATIVE
HSV2 DNA SPEC QL NAA+PROBE: NEGATIVE
SIGNIFICANT IND 70042: NORMAL
SITE SITE: NORMAL
SOURCE SOURCE: NORMAL
SPECIMEN SOURCE: NORMAL

## 2021-03-16 ENCOUNTER — TELEPHONE (OUTPATIENT)
Dept: OBGYN | Facility: CLINIC | Age: 36
End: 2021-03-16

## 2021-03-16 NOTE — TELEPHONE ENCOUNTER
Attempted to call patient and schedule her NP appt for an unspecified ovarian cycst. Referral scanned into chart;1st attempt; Mailbox full

## 2021-06-13 NOTE — LETTER
August 1, 2019         Patient: Brooke Crespo   YOB: 1985   Date of Visit: 8/1/2019           To Whom it May Concern:    Brooke Crespo was seen in my clinic on 8/1/2019.     If you have any questions or concerns, please don't hesitate to call.        Sincerely,           Jerica Whitehead P.A.-C.  Electronically Signed     
174.8

## 2021-09-22 ENCOUNTER — TELEPHONE (OUTPATIENT)
Dept: SCHEDULING | Facility: IMAGING CENTER | Age: 36
End: 2021-09-22

## 2021-09-27 ENCOUNTER — HOSPITAL ENCOUNTER (OUTPATIENT)
Dept: LAB | Facility: MEDICAL CENTER | Age: 36
End: 2021-09-27
Attending: NURSE PRACTITIONER
Payer: COMMERCIAL

## 2021-09-27 ENCOUNTER — OFFICE VISIT (OUTPATIENT)
Dept: MEDICAL GROUP | Facility: LAB | Age: 36
End: 2021-09-27
Payer: COMMERCIAL

## 2021-09-27 VITALS
TEMPERATURE: 98.2 F | BODY MASS INDEX: 27.26 KG/M2 | OXYGEN SATURATION: 99 % | SYSTOLIC BLOOD PRESSURE: 104 MMHG | DIASTOLIC BLOOD PRESSURE: 64 MMHG | RESPIRATION RATE: 14 BRPM | HEIGHT: 65 IN | WEIGHT: 163.6 LBS | HEART RATE: 84 BPM

## 2021-09-27 DIAGNOSIS — L81.1 MELASMA: ICD-10-CM

## 2021-09-27 DIAGNOSIS — Z00.00 PREVENTATIVE HEALTH CARE: ICD-10-CM

## 2021-09-27 DIAGNOSIS — M25.561 ARTHRALGIA OF BOTH KNEES: ICD-10-CM

## 2021-09-27 DIAGNOSIS — M25.562 ARTHRALGIA OF BOTH KNEES: ICD-10-CM

## 2021-09-27 DIAGNOSIS — M79.671 PAIN IN BOTH FEET: ICD-10-CM

## 2021-09-27 DIAGNOSIS — R06.83 SNORING: ICD-10-CM

## 2021-09-27 DIAGNOSIS — M79.672 PAIN IN BOTH FEET: ICD-10-CM

## 2021-09-27 LAB
25(OH)D3 SERPL-MCNC: 23 NG/ML (ref 30–100)
ALBUMIN SERPL BCP-MCNC: 4.5 G/DL (ref 3.2–4.9)
ALBUMIN/GLOB SERPL: 1.7 G/DL
ALP SERPL-CCNC: 58 U/L (ref 30–99)
ALT SERPL-CCNC: 22 U/L (ref 2–50)
ANION GAP SERPL CALC-SCNC: 10 MMOL/L (ref 7–16)
AST SERPL-CCNC: 22 U/L (ref 12–45)
BILIRUB SERPL-MCNC: 0.5 MG/DL (ref 0.1–1.5)
BUN SERPL-MCNC: 17 MG/DL (ref 8–22)
CALCIUM SERPL-MCNC: 9.2 MG/DL (ref 8.5–10.5)
CHLORIDE SERPL-SCNC: 105 MMOL/L (ref 96–112)
CHOLEST SERPL-MCNC: 146 MG/DL (ref 100–199)
CO2 SERPL-SCNC: 23 MMOL/L (ref 20–33)
CREAT SERPL-MCNC: 0.55 MG/DL (ref 0.5–1.4)
CRP SERPL HS-MCNC: <0.3 MG/DL (ref 0–0.75)
ERYTHROCYTE [DISTWIDTH] IN BLOOD BY AUTOMATED COUNT: 42.5 FL (ref 35.9–50)
ERYTHROCYTE [SEDIMENTATION RATE] IN BLOOD BY WESTERGREN METHOD: 15 MM/HOUR (ref 0–25)
EST. AVERAGE GLUCOSE BLD GHB EST-MCNC: 100 MG/DL
FASTING STATUS PATIENT QL REPORTED: NORMAL
GLOBULIN SER CALC-MCNC: 2.7 G/DL (ref 1.9–3.5)
GLUCOSE SERPL-MCNC: 80 MG/DL (ref 65–99)
HBA1C MFR BLD: 5.1 % (ref 4–5.6)
HCT VFR BLD AUTO: 39.3 % (ref 37–47)
HDLC SERPL-MCNC: 42 MG/DL
HGB BLD-MCNC: 13.3 G/DL (ref 12–16)
LDLC SERPL CALC-MCNC: 95 MG/DL
MCH RBC QN AUTO: 30.1 PG (ref 27–33)
MCHC RBC AUTO-ENTMCNC: 33.8 G/DL (ref 33.6–35)
MCV RBC AUTO: 88.9 FL (ref 81.4–97.8)
PLATELET # BLD AUTO: 226 K/UL (ref 164–446)
PMV BLD AUTO: 11.1 FL (ref 9–12.9)
POTASSIUM SERPL-SCNC: 4.2 MMOL/L (ref 3.6–5.5)
PROT SERPL-MCNC: 7.2 G/DL (ref 6–8.2)
RBC # BLD AUTO: 4.42 M/UL (ref 4.2–5.4)
SODIUM SERPL-SCNC: 138 MMOL/L (ref 135–145)
TRIGL SERPL-MCNC: 43 MG/DL (ref 0–149)
TSH SERPL DL<=0.005 MIU/L-ACNC: 1.71 UIU/ML (ref 0.38–5.33)
WBC # BLD AUTO: 5.7 K/UL (ref 4.8–10.8)

## 2021-09-27 PROCEDURE — 86140 C-REACTIVE PROTEIN: CPT

## 2021-09-27 PROCEDURE — 99213 OFFICE O/P EST LOW 20 MIN: CPT | Performed by: NURSE PRACTITIONER

## 2021-09-27 PROCEDURE — 84443 ASSAY THYROID STIM HORMONE: CPT

## 2021-09-27 PROCEDURE — 80061 LIPID PANEL: CPT

## 2021-09-27 PROCEDURE — 82306 VITAMIN D 25 HYDROXY: CPT

## 2021-09-27 PROCEDURE — 83036 HEMOGLOBIN GLYCOSYLATED A1C: CPT

## 2021-09-27 PROCEDURE — 36415 COLL VENOUS BLD VENIPUNCTURE: CPT

## 2021-09-27 PROCEDURE — 85652 RBC SED RATE AUTOMATED: CPT

## 2021-09-27 PROCEDURE — 80053 COMPREHEN METABOLIC PANEL: CPT

## 2021-09-27 PROCEDURE — 85027 COMPLETE CBC AUTOMATED: CPT

## 2021-09-27 RX ORDER — NAPROXEN 500 MG/1
500 TABLET ORAL
Status: ON HOLD | COMMUNITY
Start: 2021-07-31 | End: 2022-11-09

## 2021-09-27 NOTE — PROGRESS NOTES
Subjective:     CC:  Diagnoses of Preventative health care, Snoring, Pain in both feet, Arthralgia of both knees, and Melasma were pertinent to this visit.    HISTORY OF THE PRESENT ILLNESS: Patient is a 36 y.o. female. This pleasant patient is here today to establish care and discuss the following:    Snoring  This is a chronic condition, new to me. Patient reports that she has already had a sleep study about 4 years ago and was told that she did not have ALONDRA.     Pain in both feet  Patient reports that she walks on the outsides of her feet and it is very painful. She reports that she is developing hard calluses on the outsides of her feet. Discussed gait assessment and possible physical therapy in the future.     Melasma  Patient reports some skin darkening on her forehead and is requesting a referral to dermatology at this time.     Allergies: [place]    Current Outpatient Medications Ordered in Epic   Medication Sig Dispense Refill   • naproxen (NAPROSYN) 500 MG Tab Take 500 mg by mouth 2 (two) times a day.     • nitrofurantoin (MACROBID) 100 MG Cap Take 100 mg by mouth.     • phenazopyridine (PYRIDIUM) 100 MG Tab Take 100 mg by mouth 3 times a day.     • fluconazole (DIFLUCAN) 150 MG tablet TAKE 1 TABLET BY MOUTH NOW REPEAT DOSE IN 3 DAYS     • valacyclovir (VALTREX) 1 GM Tab Take 1 Tab by mouth 2 times a day. 20 Tab 0   • fluconazole (DIFLUCAN) 150 MG tablet Take 1 Tab by mouth every day. 1 Tab 1   • benzonatate (TESSALON) 100 MG Cap Take 1 Cap by mouth 3 times a day as needed for Cough. (Patient not taking: Reported on 1/3/2021) 30 Cap 0   • clindamycin (CLEOCIN) 2 % vaginal cream Apply once at night for 7 days. (Patient not taking: Reported on 1/3/2021) 1 Tube 0   • Oxymetazoline HCl (NASAL SPRAY NA) Spray  in nose.     • predniSONE (DELTASONE) 20 MG Tab Take 1 Tab by mouth every day. (Patient not taking: Reported on 1/3/2021) 3 Tab 0   • ketorolac (TORADOL) 10 MG Tab Take 1 Tab by mouth 3 times a day as  "needed for Moderate Pain. (Patient not taking: Reported on 1/3/2021) 15 Tab 0     No current Epic-ordered facility-administered medications on file.       No past medical history on file.    Past Surgical History:   Procedure Laterality Date   • LUMPECTOMY     • SEPTAL RECONSTRUCTION         Social History     Tobacco Use   • Smoking status: Never Smoker   • Smokeless tobacco: Never Used   Vaping Use   • Vaping Use: Never used   Substance Use Topics   • Alcohol use: Yes     Comment: soc   • Drug use: Not on file       No family history on file.    ROS:   Gen: no fevers/chills, no changes in weight  Eyes: no changes in vision  ENT: no sore throat, no hearing loss, no bloody nose  Pulm: no sob, no cough  CV: no chest pain, no palpitations  GI: no nausea/vomiting, no diarrhea  : no dysuria  MSk: no myalgias  Skin: no rash  Neuro: no headaches, no numbness/tingling  Heme/Lymph: no easy bruising        - NOTE: All other systems reviewed and are negative, except as in HPI.      Objective:     Exam: /64 (BP Location: Right arm, Patient Position: Sitting, BP Cuff Size: Adult)   Pulse 84   Temp 36.8 °C (98.2 °F)   Resp 14   Ht 1.651 m (5' 5\")   Wt 74.2 kg (163 lb 9.6 oz)   SpO2 99%  Body mass index is 27.22 kg/m².    General: Normal appearing. No distress.  HEENT: Normocephalic. Eyes conjunctiva clear lids without ptosis, pupils equal and reactive to light accommodation, ears normal shape and contour, canals are clear bilaterally, tympanic membranes are benign, nasal mucosa benign, oropharynx is without erythema, edema or exudates.   Neck: Supple without JVD or bruit. Thyroid is not enlarged.  Pulmonary: Clear to ausculation.  Normal effort. No rales, ronchi, or wheezing.  Cardiovascular: Regular rate and rhythm without murmur. Carotid and radial pulses are intact and equal bilaterally.  Abdomen: Soft, nontender, nondistended. Normal bowel sounds. Liver and spleen are not palpable  Neurologic: Grossly " nonfocal  Lymph: No cervical or supraclavicular lymph nodes are palpable  Skin: Warm and dry.  No obvious lesions.  Musculoskeletal: Normal gait. No extremity cyanosis, clubbing, or edema.  Psych: Normal mood and affect. Alert and oriented x3. Judgment and insight is normal.    Labs: Ordered today in office.     Assessment & Plan:   36 y.o. female with the following -    1. Pain in both feet  Referral placed to podiatry. Patient will consider PT in the future.   - REFERRAL TO PODIATRY    2. Arthralgia of both knees  Labs ordered. Consider PT in the future.   - CRP QUANTITIVE (NON-CARDIAC); Future  - Sed Rate; Future    3. Snoring  STOP-BANG today is 2. Discussed sleep hygiene and positioning. Patient might consider ENT referral or sleep study in the future.     4. Melasma  Discussed photoprotection, sunscreen. Referral placed to dermatology.   - REFERRAL TO DERMATOLOGY    5. Preventative health care  Labs ordered today.  - Comp Metabolic Panel; Future  - Lipid Profile; Future  - HEMOGLOBIN A1C; Future  - TSH WITH REFLEX TO FT4; Future  - VITAMIN D,25 HYDROXY; Future  - CBC WITHOUT DIFFERENTIAL; Future  - REFERRAL TO GYNECOLOGY    Return in about 6 months (around 3/27/2022).    Please note that this dictation was created using voice recognition software. I have made every reasonable attempt to correct obvious errors, but I expect that there are errors of grammar and possibly content that I did not discover before finalizing the note.

## 2021-09-27 NOTE — ASSESSMENT & PLAN NOTE
Patient reports that she walks on the outsides of her feet and it is very painful. She reports that she is developing hard calluses on the outsides of her feet. Discussed gait assessment and possible physical therapy in the future.

## 2021-09-27 NOTE — ASSESSMENT & PLAN NOTE
This is a chronic condition, new to me. Patient reports that she has already had a sleep study about 4 years ago and was told that she did not have ALONDRA.

## 2021-09-28 NOTE — ASSESSMENT & PLAN NOTE
Patient reports some skin darkening on her forehead and is requesting a referral to dermatology at this time.

## 2021-10-26 ENCOUNTER — APPOINTMENT (OUTPATIENT)
Dept: URGENT CARE | Facility: CLINIC | Age: 36
End: 2021-10-26
Payer: COMMERCIAL

## 2022-03-05 ENCOUNTER — OCCUPATIONAL MEDICINE (OUTPATIENT)
Dept: URGENT CARE | Facility: PHYSICIAN GROUP | Age: 37
End: 2022-03-05
Payer: COMMERCIAL

## 2022-03-05 VITALS
DIASTOLIC BLOOD PRESSURE: 74 MMHG | OXYGEN SATURATION: 94 % | HEIGHT: 65 IN | BODY MASS INDEX: 27.49 KG/M2 | WEIGHT: 165 LBS | TEMPERATURE: 98.5 F | SYSTOLIC BLOOD PRESSURE: 122 MMHG | RESPIRATION RATE: 16 BRPM | HEART RATE: 98 BPM

## 2022-03-05 DIAGNOSIS — M54.41 CHRONIC RIGHT-SIDED LOW BACK PAIN WITH RIGHT-SIDED SCIATICA: ICD-10-CM

## 2022-03-05 DIAGNOSIS — S39.012A STRAIN OF LUMBAR REGION, INITIAL ENCOUNTER: ICD-10-CM

## 2022-03-05 DIAGNOSIS — G89.29 CHRONIC RIGHT-SIDED LOW BACK PAIN WITH RIGHT-SIDED SCIATICA: ICD-10-CM

## 2022-03-05 PROCEDURE — 99213 OFFICE O/P EST LOW 20 MIN: CPT | Performed by: NURSE PRACTITIONER

## 2022-03-05 ASSESSMENT — ENCOUNTER SYMPTOMS
WEAKNESS: 0
PALPITATIONS: 0
FLANK PAIN: 0
HEADACHES: 0
CHILLS: 0
MYALGIAS: 1
NAUSEA: 0
CONSTIPATION: 0
SHORTNESS OF BREATH: 0
ORTHOPNEA: 0
VOMITING: 0
SENSORY CHANGE: 0
TINGLING: 0
ABDOMINAL PAIN: 0
FOCAL WEAKNESS: 0
FEVER: 0
DIARRHEA: 0
FALLS: 0
DIZZINESS: 0
BACK PAIN: 1

## 2022-03-05 ASSESSMENT — FIBROSIS 4 INDEX: FIB4 SCORE: 0.75

## 2022-03-05 NOTE — LETTER
[facility] [address] [place] [address]  Phone:  [phone] - Fax:  [phone]   [facility] Progress Report and Disability Certification  Date of Service: 3/5/2022   No Show:  No  Date / Time of Next Visit: 3/18/2022   Claim Information   Patient Name: Brooke Crespo  Claim Number:     Employer: [employer] *** Date of Injury: 6/17/2021     Insurer / TPA: [employer] *** ID / SSN:     Occupation:  *** Diagnosis: Diagnoses of Strain of lumbar region, initial encounter and Chronic right-sided low back pain with right-sided sciatica were pertinent to this visit.    Medical Information   Related to Industrial Injury? Yes ***   Subjective Complaints:  DOI 6/17/2021. States she was sitting and had pain on right side of hip, lower back. States she has been working with [employer] on site with back muscle exercies. Pain radiated to lower back and right lower leg to back of right knee. States was seen by a doctor and no diagnostics or meds prescribed. Has tried topical pain reliever, Kvng Theodore, IcyHot with lidocaine with temporary relief. Daily pain in lower leg behind knee with ambulation. No over the counter anti-inflammatory used. Uses hand massager to back. Cold compression after work. No heat application. No previous back injuries. No secondary job. Denies numbness/tingling in lower extremity. 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 States back pain, worse at night with moving side to side. Intermittent right upper trapezius muscle pain. States prefers to not take over the counter meds like oral anti-inflammatory due to trying to get pregnant.    Objective Findings: A/O x 3. Skin p/w/d, skin sensation intact. Vitals WNL. Full range of motion of lower back, right upper back and right lower leg. No tenderness to touch at right upper trapezius muscle, lower back,  right buttock, upper hamstring or behind the knee. No swelling, skin discolorations, deformity of knee, lower back or right shoulder. No antalgic gait. Able to lift right arm above head with no problems or difficulty. Equal leg strength.    Pre-Existing Condition(s):     Assessment:   Initial Visit    Status: Discharged / Care Transfer  Permanent Disability:No    Plan:      Diagnostics:      Comments:       Disability Information   Status: Released to Restricted Duty    From:  3/5/2022  Through: 3/18/2022 Restrictions are:     Physical Restrictions   Sitting:    Standing:  < or = to 6 hrs/day Stooping:  < or = to 6 hrs/day Bending:  < or = to 6 hrs/day   Squatting:  < or = to 6 hrs/day Walking:  < or = to 6 hrs/day Climbing:    Pushing:      Pulling:    Other:    Reaching Above Shoulder (L):   Reaching Above Shoulder (R):       Reaching Below Shoulder (L):    Reaching Below Shoulder (R):      Not to exceed Weight Limits   Carrying(hrs):   Weight Limit(lb): < or = to 10 pounds Lifting(hrs):   Weight  Limit(lb): < or = to 10 pounds   Comments: Recommend seated work for half of shift  Recommend alternate ice/heat, 2-3x/day as discussed  Recommend massage and stretching after heat application  Will initiate Physical Therapy at this time  Follow up with Occupational Med at next visit in 2 weeks     Repetitive Actions   Hands: i.e. Fine Manipulations from Grasping:     Feet: i.e. Operating Foot Controls:     Driving / Operate Machinery:     Health Care Provider’s Original or Electronic Signature  MARISA Daly Health Care Provider’s Original or Electronic Signature    Edgar Fagan MD         Clinic Name / Location: [address] [address] Clinic Phone Number: Dept: [phone]   Appointment Time: 1:25 Pm Visit Start Time: 1:33 PM   Check-In Time:  1:30 Pm Visit Discharge Time:  ***   Original-Treating Physician or Chiropractor    Page 2-Insurer/TPA     Page 3-Employer    Page 4-Employee

## 2022-03-05 NOTE — PROGRESS NOTES
Subjective     Brooke Crespo is a 36 y.o. female who presents with Knee Pain (NEW WC DOI 6/17/2021 back of (R) knee, low right back side and underneath (R) shoulder blade )      DOI 6/17/2021. States she was sitting and had pain on right side of hip, lower back. States she has been working with [email or web address] on site with back muscle exercies. Pain radiated to lower back and right lower leg to back of right knee. States was seen by a doctor and no diagnostics or meds prescribed. Has tried topical pain reliever, Kvng Theodore, IcyHot with lidocaine with temporary relief. Daily pain in lower leg behind knee with ambulation. No over the counter anti-inflammatory used. Uses hand massager to back. Cold compression after work. No heat application. No previous back injuries. No secondary job. Denies numbness/tingling in lower extremity. 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 States back pain, worse at night with moving side to side. Intermittent right upper trapezius muscle pain. States prefers to not take over the counter meds like oral anti-inflammatory due to trying to get pregnant.      HPI  PMH: No pertinent past medical history to this problem  MEDS: Medications were reviewed in Epic  ALLERGIES: Allergies were reviewed in Epic  FH: No pertinent family history to this problem         Review of Systems   Constitutional: Negative for chills, fever and malaise/fatigue.   Respiratory: Negative for shortness of breath.    Cardiovascular: Negative for chest pain, palpitations and orthopnea.   Gastrointestinal: Negative for abdominal pain, constipation, diarrhea, nausea and vomiting.   Genitourinary: Negative for dysuria, flank pain, frequency, hematuria and urgency.   Musculoskeletal: Positive for back pain and myalgias. Negative for falls and joint pain.   Skin: Negative for itching and rash.   Neurological: Negative for dizziness, tingling, sensory  "change, focal weakness, weakness and headaches.   All other systems reviewed and are negative.             Objective     /74 (BP Location: Left arm, Patient Position: Sitting, BP Cuff Size: Adult)   Pulse 98   Temp 36.9 °C (98.5 °F) (Temporal)   Resp 16   Ht 1.651 m (5' 5\")   Wt 74.8 kg (165 lb)   SpO2 94%   BMI 27.46 kg/m²      Physical Exam  Vitals reviewed.   Constitutional:       General: She is awake. She is not in acute distress.     Appearance: Normal appearance. She is well-developed. She is not ill-appearing, toxic-appearing or diaphoretic.   HENT:      Head: Normocephalic.   Eyes:      Pupils: Pupils are equal, round, and reactive to light.   Cardiovascular:      Rate and Rhythm: Normal rate.   Pulmonary:      Effort: Pulmonary effort is normal.   Musculoskeletal:      Right shoulder: Normal.      Cervical back: Normal range of motion and neck supple.      Lumbar back: Normal.      Right knee: Normal.   Skin:     General: Skin is warm and dry.      Findings: No abrasion, bruising, ecchymosis, erythema, signs of injury, laceration, rash or wound.   Neurological:      Mental Status: She is alert and oriented to person, place, and time.   Psychiatric:         Attention and Perception: Attention normal.         Mood and Affect: Mood normal.         Speech: Speech normal.         Behavior: Behavior normal. Behavior is cooperative.         A/O x 3. Skin p/w/d, skin sensation intact. Vitals WNL. Full range of motion of lower back, right upper back and right lower leg. No tenderness to touch at right upper trapezius muscle, lower back, right buttock, upper hamstring or behind the knee. No swelling, skin discolorations, deformity of knee, lower back or right shoulder. No antalgic gait. Able to lift right arm above head with no problems or difficulty. Equal leg strength.                    Assessment & Plan        1. Strain of lumbar region, initial encounter    - Referral to Occupational Medicine  - " Referral to Physical Therapy    2. Chronic right-sided low back pain with right-sided sciatica    - Referral to Occupational Medicine  - Referral to Physical Therapy     Recommend seated work for half of shift  Recommend alternate ice/heat, 2-3x/day as discussed  Recommend massage and stretching after heat application  Will initiate Physical Therapy at this time  Follow up with Occupational Med at next visit in 2 weeks

## 2022-03-05 NOTE — LETTER
"EMPLOYEE’S CLAIM FOR COMPENSATION/ REPORT OF INITIAL TREATMENT  FORM C-4    EMPLOYEE’S CLAIM - PROVIDE ALL INFORMATION REQUESTED   First Name  Brooke Last Name  Zak Birthdate                    1985                Sex  female Claim Number (Insurer’s Use Only)    Home Address  [address] Age  36 y.o. Height  1.651 m (5' 5\") Weight  74.8 kg (165 lb) [facility]     [facility] Zip  [address] Telephone  [phone] (home)    Mailing Address  [address] [facility] Zip  [address] Primary Language Spoken  English    Insurer   Third-Party   [employer]   Employee's Occupation (Job Title) When Injury or Occupational Disease Occurred      Employer's Name/Company Name  [employer]  Telephone  [phone]    Office Mail Address (Number and Street)   [address]  [facility]  Zip  [address]    Date of Injury  6/17/2021               Hours Injury  12:00 PM Date Employer Notified  6/17/2021 Last Day of Work after Injury     or Occupational Disease  3/2/2022 Supervisor to Whom Injury     Reported  Melyssa Jean   Address or Location of Accident (if applicable)  [place] [place]   What were you doing at the time of accident? (if applicable)  working, moving parts, walking    How did this injury or occupational disease occur? (Be specific an answer in detail. Use additional sheet if necessary)  Sharp pain on my back.  week after upper body, week later lower leg, was not able to move, sleep...   If you believe that you have an occupational disease, when did you first have knowledge of the disability and it relationship to your employment?  N/A Witnesses to the Accident  my team, saw me, helped to bring a chair      Nature of Injury or Occupational Disease  Workers' Compensation  Part(s) of Body Injured or Affected  Lower Back Area (Lumbar Area & " Lumbo-Sacral), Upper Leg (R), Lower Leg (R)    I certify that the above is true and correct to the best of my knowledge and that I have provided this information in order to obtain the benefits of [place] of [place]).  I hereby authorize any physician, chiropractor, surgeon, practitioner, or other person, any hospital, including [facility], any medical service organization, any insurance company, or other institution or organization to release to each other, any medical or other information, including benefits paid or payable, pertinent to this injury or disease, except information relative to diagnosis, treatment and/or counseling for AIDS, psychological conditions, alcohol or controlled substances, for which I must give specific authorization.  A Photostat of this authorization shall be as valid as the original.     Date   Place Employee’s Original or  *Electronic Signature   THIS REPORT MUST BE COMPLETED AND MAILED WITHIN 3 WORKING DAYS OF TREATMENT   Place  [facility]  Name of Facility  [place]   Date  3/5/2022 Diagnosis and Description of Injury or Occupational Disease  (S39.012A) Strain of lumbar region, initial encounter  (M54.41,  G89.29) Chronic right-sided low back pain with right-sided sciatica Is there evidence the injured employee was under the influence of alcohol and/or another controlled substance at the time of accident?  ? No ? Yes (if yes, please explain)    Hour  1:33 PM   Diagnoses of Strain of lumbar region, initial encounter and Chronic right-sided low back pain with right-sided sciatica were pertinent to this visit. No   Treatment  Recommend seated work for half of shift  Recommend alternate ice/heat, 2-3x/day as discussed  Recommend massage and stretching after heat application  Will initiate Physical Therapy at this time  Follow up with  Occupational Med at next visit in 2 weeks     Have you advised the patient to remain off work five days or     more?    X-Ray Findings      ? Yes Indicate dates:   From   To      From information given by the employee, together with medical evidence, can        you directly connect this injury or occupational disease as job incurred?  Yes ? No If no, is the injured employee capable of:  ? full duty  No ? modified duty  Yes   Is additional medical care by a physician indicated?  Yes If Modified Duty, Specify any Limitations / Restrictions  No squat, stoop, bend, stand, walk > 6 hrs. No lift/carry > 6 hrs.   Do you know of any previous injury or disease contributing to this condition or occupational disease?  ? Yes ? No (Explain if yes)                          No   Date  3/5/2022 Print Health Care Provider's   MARISA Daly I certify the employer’s copy of  this form was mailed on:   Address  [address]  [facility] Insurer’s Use Only     [facility]  [address]    Provider’s Tax ID Number  [id number] Telephone  Dept: [phone]             Health Care Provider’s Original or Electronic Signature  e-TANNER Schuster.R.NDelisa Degree (MD,DO, DC,PA-C,APRN)   APRN      * Complete and attach Release of Information (Form C-4A) when injured employee signs C-4 Form electronically  ORIGINAL - TREATING HEALTHCARE PROVIDER PAGE 2 - INSURER/TPA PAGE 3 - EMPLOYER PAGE 4 - EMPLOYEE             Form C-4 (rev.08/21)           BRIEF DESCRIPTION OF RIGHTS AND BENEFITS  (Pursuant to [place]RS 616C.050)    Notice of Injury or Occupational Disease (Incident Report Form C-1): If an injury or occupational disease (OD) arises out of and in the course of employment, you must provide written notice to your employer as soon as practicable, but no later than 7 days after the accident or OD. Your employer shall maintain a sufficient supply of the required forms.    Claim for Compensation (Form C-4): If medical  "treatment is sought, the form C-4 is available at the place of initial treatment. A completed \"Claim for Compensation\" (Form C-4) must be filed within 90 days after an accident or OD. The treating physician or chiropractor must, within 3 working days after treatment, complete and mail to the employer, the employer's insurer and third-party , the Claim for Compensation.    Medical Treatment: If you require medical treatment for your on-the-job injury or OD, you may be required to select a physician or chiropractor from a list provided by your workers’ compensation insurer, if it has contracted with an Organization for Managed Care (MCO) or Preferred Provider Organization (PPO) or providers of health care. If your employer has not entered into a contract with an MCO or PPO, you may select a physician or chiropractor from the Panel of Physicians and Chiropractors. Any medical costs related to your industrial injury or OD will be paid by your insurer.    Temporary Total Disability (TTD): If your doctor has certified that you are unable to work for a period of at least 5 consecutive days, or 5 cumulative days in a 20-day period, or places restrictions on you that your employer does not accommodate, you may be entitled to TTD compensation.    Temporary Partial Disability (TPD): If the wage you receive upon reemployment is less than the compensation for TTD to which you are entitled, the insurer may be required to pay you TPD compensation to make up the difference. TPD can only be paid for a maximum of 24 months.    Permanent Partial Disability (PPD): When your medical condition is stable and there is an indication of a PPD as a result of your injury or OD, within 30 days, your insurer must arrange for an evaluation by a rating physician or chiropractor to determine the degree of your PPD. The amount of your PPD award depends on the date of injury, the results of the PPD evaluation, your age and " wage.    Permanent Total Disability (PTD): If you are medically certified by a treating physician or chiropractor as permanently and totally disabled and have been granted a PTD status by your insurer, you are entitled to receive monthly benefits not to exceed 66 2/3% of your average monthly wage. The amount of your PTD payments is subject to reduction if you previously received a lump-sum PPD award.    Vocational Rehabilitation Services: You may be eligible for vocational rehabilitation services if you are unable to return to the job due to a permanent physical impairment or permanent restrictions as a result of your injury or occupational disease.    Transportation and Per Maximo Reimbursement: You may be eligible for travel expenses and per maximo associated with medical treatment.    Reopening: You may be able to reopen your claim if your condition worsens after claim closure.     Appeal Process: If you disagree with a written determination issued by the insurer or the insurer does not respond to your request, you may appeal to the Department of Administration, , by following the instructions contained in your determination letter. You must appeal the determination within 70 days from the date of the determination letter at [address], [address], [place], [place] [address], or [address], [place] [address]. If you disagree with the  decision, you may appeal to the Department of Administration, . You must file your appeal within 30 days from the date of the  decision letter at [address], [address], [place] [address], or [address] [facility] [facility] [address], [place] [address]. If you disagree with a decision of an , you may file a petition for judicial review with the District Court. You must do so within 30 days of the Appeal Officer’s decision. You may be represented by an   at your own expense or you may contact the NA for possible representation.    [place]  for Injured Workers (NAIW): If you disagree with a  decision, you may request that NAIW represent you without charge at an  Hearing. For information regarding denial of benefits, you may contact the NA at: [address] [facility], [address], [place] [address], [phone]) [phone], or [address], [address], [place] [address], [phone]) [phone]    To File a Complaint with the Division: If you wish to file a complaint with the  of the Division of Industrial Relations (DIR),  please contact the Workers’ Compensation Section, [address], [address], [place], [place] [address], telephone (418) [phone], or [address], [address], [place], [place] [address], telephone ([phone].    For assistance with Workers’ Compensation Issues: You may contact the [facility] Office for Consumer Health Assistance, [address], [address] [facility] [address], [place], [place] [address], Toll Free 1-296.513.3285, Web site: http://[facility].[place].gov/Programs/PING E-mail: [email or web address].nv.[facility]              __________________________________________________________________                                    _________________            Employee Name / Signature                                                                                                                            Date                                                                                                                                                                                                                              D-2 (rev. 10/20)

## 2022-03-05 NOTE — LETTER
[facility] [address] [place] [address]  Phone:  [phone] - Fax:  [phone]   [facility] Progress Report and Disability Certification  Date of Service: 3/5/2022   No Show:  No  Date / Time of Next Visit: 3/18/2022 in Occupational Medicine   Claim Information   Patient Name: Brooke Crespo  Claim Number:     Employer: [employer]  Date of Injury: 6/17/2021     Insurer / TPA: [employer]  ID / SSN:     Occupation:   Diagnosis: Diagnoses of Strain of lumbar region, initial encounter and Chronic right-sided low back pain with right-sided sciatica were pertinent to this visit.    Medical Information   Related to Industrial Injury? Yes    Subjective Complaints:  DOI 6/17/2021. States she was sitting and had pain on right side of hip, lower back. States she has been working with [employer] on site with back muscle exercies. Pain radiated to lower back and right lower leg to back of right knee. States was seen by a doctor and no diagnostics or meds prescribed. Has tried topical pain reliever, Kvng Theodore, IcyHot with lidocaine with temporary relief. Daily pain in lower leg behind knee with ambulation. No over the counter anti-inflammatory used. Uses hand massager to back. Cold compression after work. No heat application. No previous back injuries. No secondary job. Denies numbness/tingling in lower extremity. 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 States back pain, worse at night with moving side to side. Intermittent right upper trapezius muscle pain. States prefers to not take over the counter meds like oral anti-inflammatory due to trying to get pregnant.    Objective Findings: A/O x 3. Skin p/w/d, skin sensation intact. Vitals WNL. Full range of motion of lower back, right upper back and right lower leg. No tenderness to touch at right upper trapezius muscle,  lower back, right buttock, upper hamstring or behind the knee. No swelling, skin discolorations, deformity of knee, lower back or right shoulder. No antalgic gait. Able to lift right arm above head with no problems or difficulty. Equal leg strength.    Pre-Existing Condition(s):     Assessment:   Initial Visit    Status: Discharged / Care Transfer  Permanent Disability:No    Plan:      Diagnostics:      Comments:       Disability Information   Status: Released to Restricted Duty    From:  3/5/2022  Through: 3/18/2022 Restrictions are:     Physical Restrictions   Sitting:    Standing:  < or = to 6 hrs/day Stooping:  < or = to 6 hrs/day Bending:  < or = to 6 hrs/day   Squatting:  < or = to 6 hrs/day Walking:  < or = to 6 hrs/day Climbing:    Pushing:      Pulling:    Other:    Reaching Above Shoulder (L):   Reaching Above Shoulder (R):       Reaching Below Shoulder (L):    Reaching Below Shoulder (R):      Not to exceed Weight Limits   Carrying(hrs):   Weight Limit(lb): < or = to 10 pounds Lifting(hrs):   Weight  Limit(lb): < or = to 10 pounds   Comments: Recommend seated work for half of shift  Recommend alternate ice/heat, 2-3x/day as discussed  Recommend massage and stretching after heat application  Will initiate Physical Therapy at this time  Follow up with Occupational Med at next visit in 2 weeks     Repetitive Actions   Hands: i.e. Fine Manipulations from Grasping:     Feet: i.e. Operating Foot Controls:     Driving / Operate Machinery:     Health Care Provider’s Original or Electronic Signature  Romina Bradford A.P.R.NDelisa Health Care Provider’s Original or Electronic Signature    Edgar Fagan MD         Clinic Name / Location: [facility] [address] [address] Clinic Phone Number: Dept: [phone]   Appointment Time: 1:25 Pm Visit Start Time: 1:33 PM   Check-In Time:  1:30 Pm Visit Discharge Time:  2:39 pm   Original-Treating Physician or Chiropractor    Page  2-Insurer/TPA    Page 3-Employer    Page 4-Employee

## 2022-03-16 ENCOUNTER — OCCUPATIONAL MEDICINE (OUTPATIENT)
Dept: OCCUPATIONAL MEDICINE | Facility: CLINIC | Age: 37
End: 2022-03-16
Payer: COMMERCIAL

## 2022-03-16 VITALS
DIASTOLIC BLOOD PRESSURE: 82 MMHG | BODY MASS INDEX: 28.16 KG/M2 | TEMPERATURE: 98.5 F | HEART RATE: 72 BPM | OXYGEN SATURATION: 98 % | WEIGHT: 169 LBS | SYSTOLIC BLOOD PRESSURE: 124 MMHG | HEIGHT: 65 IN | RESPIRATION RATE: 14 BRPM

## 2022-03-16 DIAGNOSIS — S39.012D STRAIN OF LUMBAR REGION, SUBSEQUENT ENCOUNTER: ICD-10-CM

## 2022-03-16 DIAGNOSIS — S86.911D KNEE STRAIN, RIGHT, SUBSEQUENT ENCOUNTER: ICD-10-CM

## 2022-03-16 PROCEDURE — 99203 OFFICE O/P NEW LOW 30 MIN: CPT | Performed by: PREVENTIVE MEDICINE

## 2022-03-16 ASSESSMENT — FIBROSIS 4 INDEX: FIB4 SCORE: 0.75

## 2022-03-16 NOTE — LETTER
[address],   [address] [place] [address]  Phone:  [phone] - Fax:  [phone]   [facility] Progress Report and Disability Certification  Date of Service: 3/16/2022   No Show:  No  Date / Time of Next Visit: 3/31/22 @ 7:45AM   Claim Information   Patient Name: Brooke Crespo  Claim Number:     Employer: [employer]  Date of Injury: 6/17/2021     Insurer / TPA: [employer]  ID / SSN:     Occupation:   Diagnosis: Diagnoses of Strain of lumbar region, subsequent encounter and Knee strain, right, subsequent encounter were pertinent to this visit.    Medical Information   Related to Industrial Injury?   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  She states symptoms continued over the next few weeks and she was initially treated at the onsite clinic with OTC muscle creams, ointments and patches and stretches.  Symptoms not significantly improved and so she presented to the urgent care a few weeks ago.  She states pain is in the very lower part of the back denies radiating pain, numbness or tingling however she does have pain in the posterior aspect of the right knee which has been present for the initial injury.  She states pain is especially worse with walking.  She states she has been trying to get pregnant so has not been taking any medications and has just been trying over-the-counter creams.  She denies prior significant low back injuries.  Patient states that she is concerned that some pain will last for many years.   Objective Findings: Lumbar: No gross deformity.  Tenderness palpation L5-S1 paraspinal musculature and right SI joint.  Full range of motion with mild discomfort.  Reflexes intact.  Normal gait.  Right knee: No gross  deformity.  Mild tenderness over popliteal fossa.  Full range of motion.  Anterior/posterior drawer test negative.  No laxity varus valgus stress.  Kimber's negative.   Pre-Existing Condition(s):     Assessment:   Condition Same    Status: Additional Care Required  Permanent Disability:No    Plan:      Diagnostics:      Comments:  Referral to physical therapy pending approval  Placed referral for chiropractor treatments, patient would like to try both at the same time  Recommend OTC Tylenol as needed, do not take ibuprofen, naproxen or other medications  Okay to use OTC muscle   creams/ointments  Okay to use heat/ice  Restricted duty  Follow-up 3 weeks    Disability Information   Status: Released to Restricted Duty    From:  3/16/2022  Through: 4/6/2022 Restrictions are: Temporary   Physical Restrictions   Sitting:    Standing:  < or = to 6 hrs/day Stooping:  < or = to 6 hrs/day Bending:  < or = to 6 hrs/day   Squatting:    Walking:  < or = to 6 hrs/day Climbing:    Pushing:      Pulling:    Other:    Reaching Above Shoulder (L):   Reaching Above Shoulder (R):       Reaching Below Shoulder (L):    Reaching Below Shoulder (R):      Not to exceed Weight Limits   Carrying(hrs):   Weight Limit(lb): < or = to 10 pounds Lifting(hrs):   Weight  Limit(lb): < or = to 10 pounds   Comments:      Repetitive Actions   Hands: i.e. Fine Manipulations from Grasping:     Feet: i.e. Operating Foot Controls:     Driving / Operate Machinery:     Health Care Provider’s Original or Electronic Signature  Gigi Amaral D.O. Health Care Provider’s Original or Electronic Signature    Edgar Fagan MD         Clinic Name / Location: [address] [address] Clinic Phone Number: Dept: [phone]   Appointment Time: 7:45 Am Visit Start Time: 8:10 AM   Check-In Time:  8:09 Am Visit Discharge Time:  8:55AM   Original-Treating Physician or Chiropractor    Page 2-Insurer/TPA    Page  3-Employer    Page 4-Employee

## 2022-03-16 NOTE — PROGRESS NOTES
"Subjective:     Brooke Crespo is a 36 y.o. 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  She states symptoms continued over the next few weeks and she was initially treated at the onsite clinic with OTC muscle creams, ointments and patches and stretches.  Symptoms not significantly improved and so she presented to the urgent care a few weeks ago.  She states pain is in the very lower part of the back denies radiating pain, numbness or tingling however she does have pain in the posterior aspect of the right knee which has been present for the initial injury.  She states pain is especially worse with walking.  She states she has been trying to get pregnant so has not been taking any medications and has just been trying over-the-counter creams.  She denies prior significant low back injuries.  Patient states that she is concerned that some pain will last for many years.    ROS: All systems were reviewed on intake form, form was reviewed and signed. See scanned documents in media. Pertinent positives and negatives included in HPI.    PMH: No pertinent past medical history to this problem  MEDS: Medications were reviewed in Epic  ALLERGIES:   Allergies   Allergen Reactions   • Strawberry Rash     SOCHX: Works as a  at [employer]  FH: No pertinent family history to this problem       Objective:     /82   Pulse 72   Temp 36.9 °C (98.5 °F) (Temporal)   Resp 14   Ht 1.651 m (5' 5\")   Wt 76.7 kg (169 lb)   SpO2 98%   BMI 28.12 kg/m²     Constitutional: Patient is in no acute distress. Appears well-developed and well-nourished.   HENT: Normocephalic and atraumatic. EOM are normal. No scleral icterus.   Cardiovascular: Normal rate.  "   Pulmonary/Chest: Effort normal. No respiratory distress.   Neurological: Patient is alert and oriented to person, place, and time.   Skin: Skin is warm and dry.   Psychiatric: Normal mood and affect. Behavior is normal.     Lumbar: No gross deformity.  Tenderness palpation L5-S1 paraspinal musculature and right SI joint.  Full range of motion with mild discomfort.  Reflexes intact.  Normal gait.  Right knee: No gross deformity.  Mild tenderness over popliteal fossa.  Full range of motion.  Anterior/posterior drawer test negative.  No laxity varus valgus stress.  Kimber's negative.    Assessment/Plan:       1. Strain of lumbar region, subsequent encounter  - Referral to Chiropractic    2. Knee strain, right, subsequent encounter    Released to Restricted Duty FROM 3/16/2022 TO 4/6/2022     Referral to physical therapy pending approval  Placed referral for chiropractor treatments, patient would like to try both at the same time  Recommend OTC Tylenol as needed, do not take ibuprofen, naproxen or other medications  Okay to use OTC muscle   creams/ointments  Okay to use heat/ice  Restricted duty  Follow-up 3 weeks    Differential diagnosis, natural history, supportive care, and indications for immediate follow-up discussed.    Approximately 35 minutes were spent in reviewing notes, preparing for visit, obtaining history, exam and evaluation, patient counseling/education and post visit documentation/orders.

## 2022-03-17 ENCOUNTER — APPOINTMENT (OUTPATIENT)
Dept: RADIOLOGY | Facility: MEDICAL CENTER | Age: 37
End: 2022-03-17
Attending: EMERGENCY MEDICINE
Payer: COMMERCIAL

## 2022-03-17 ENCOUNTER — PATIENT OUTREACH (OUTPATIENT)
Dept: HEALTH INFORMATION MANAGEMENT | Facility: OTHER | Age: 37
End: 2022-03-17

## 2022-03-17 ENCOUNTER — HOSPITAL ENCOUNTER (EMERGENCY)
Facility: MEDICAL CENTER | Age: 37
End: 2022-03-17
Attending: EMERGENCY MEDICINE
Payer: COMMERCIAL

## 2022-03-17 VITALS
BODY MASS INDEX: 27.92 KG/M2 | HEIGHT: 65 IN | WEIGHT: 167.55 LBS | SYSTOLIC BLOOD PRESSURE: 111 MMHG | OXYGEN SATURATION: 99 % | DIASTOLIC BLOOD PRESSURE: 71 MMHG | RESPIRATION RATE: 18 BRPM | TEMPERATURE: 97.8 F | HEART RATE: 94 BPM

## 2022-03-17 DIAGNOSIS — O20.0 PREGNANCY WITH EARLY THREATENED ABORTION: ICD-10-CM

## 2022-03-17 LAB
ALBUMIN SERPL BCP-MCNC: 4.4 G/DL (ref 3.2–4.9)
ALBUMIN/GLOB SERPL: 1.6 G/DL
ALP SERPL-CCNC: 54 U/L (ref 30–99)
ALT SERPL-CCNC: 22 U/L (ref 2–50)
ANION GAP SERPL CALC-SCNC: 10 MMOL/L (ref 7–16)
APPEARANCE UR: CLEAR
AST SERPL-CCNC: 17 U/L (ref 12–45)
B-HCG SERPL-ACNC: 1080 MIU/ML (ref 0–5)
BACTERIA GENITAL QL WET PREP: NORMAL
BASOPHILS # BLD AUTO: 0.2 % (ref 0–1.8)
BASOPHILS # BLD: 0.01 K/UL (ref 0–0.12)
BILIRUB SERPL-MCNC: 0.6 MG/DL (ref 0.1–1.5)
BILIRUB UR QL STRIP.AUTO: NEGATIVE
BUN SERPL-MCNC: 10 MG/DL (ref 8–22)
CALCIUM SERPL-MCNC: 9.5 MG/DL (ref 8.5–10.5)
CHLORIDE SERPL-SCNC: 106 MMOL/L (ref 96–112)
CO2 SERPL-SCNC: 24 MMOL/L (ref 20–33)
COLOR UR: YELLOW
CREAT SERPL-MCNC: 0.6 MG/DL (ref 0.5–1.4)
EOSINOPHIL # BLD AUTO: 0.12 K/UL (ref 0–0.51)
EOSINOPHIL NFR BLD: 2 % (ref 0–6.9)
ERYTHROCYTE [DISTWIDTH] IN BLOOD BY AUTOMATED COUNT: 41.9 FL (ref 35.9–50)
GFR SERPLBLD CREATININE-BSD FMLA CKD-EPI: 119 ML/MIN/1.73 M 2
GLOBULIN SER CALC-MCNC: 2.8 G/DL (ref 1.9–3.5)
GLUCOSE SERPL-MCNC: 97 MG/DL (ref 65–99)
GLUCOSE UR STRIP.AUTO-MCNC: NEGATIVE MG/DL
HCT VFR BLD AUTO: 40.2 % (ref 37–47)
HGB BLD-MCNC: 13.3 G/DL (ref 12–16)
IMM GRANULOCYTES # BLD AUTO: 0.02 K/UL (ref 0–0.11)
IMM GRANULOCYTES NFR BLD AUTO: 0.3 % (ref 0–0.9)
KETONES UR STRIP.AUTO-MCNC: ABNORMAL MG/DL
LEUKOCYTE ESTERASE UR QL STRIP.AUTO: NEGATIVE
LIPASE SERPL-CCNC: 16 U/L (ref 11–82)
LYMPHOCYTES # BLD AUTO: 1.35 K/UL (ref 1–4.8)
LYMPHOCYTES NFR BLD: 22.1 % (ref 22–41)
MCH RBC QN AUTO: 29.4 PG (ref 27–33)
MCHC RBC AUTO-ENTMCNC: 33.1 G/DL (ref 33.6–35)
MCV RBC AUTO: 88.9 FL (ref 81.4–97.8)
MICRO URNS: ABNORMAL
MONOCYTES # BLD AUTO: 0.59 K/UL (ref 0–0.85)
MONOCYTES NFR BLD AUTO: 9.7 % (ref 0–13.4)
NEUTROPHILS # BLD AUTO: 4.02 K/UL (ref 2–7.15)
NEUTROPHILS NFR BLD: 65.7 % (ref 44–72)
NITRITE UR QL STRIP.AUTO: NEGATIVE
NRBC # BLD AUTO: 0 K/UL
NRBC BLD-RTO: 0 /100 WBC
NUMBER OF RH DOSES IND 8505RD: NORMAL
PH UR STRIP.AUTO: 8 [PH] (ref 5–8)
PLATELET # BLD AUTO: 266 K/UL (ref 164–446)
PMV BLD AUTO: 10 FL (ref 9–12.9)
POTASSIUM SERPL-SCNC: 4.1 MMOL/L (ref 3.6–5.5)
PROT SERPL-MCNC: 7.2 G/DL (ref 6–8.2)
PROT UR QL STRIP: NEGATIVE MG/DL
RBC # BLD AUTO: 4.52 M/UL (ref 4.2–5.4)
RBC UR QL AUTO: NEGATIVE
RH BLD: NORMAL
SIGNIFICANT IND 70042: NORMAL
SITE SITE: NORMAL
SODIUM SERPL-SCNC: 140 MMOL/L (ref 135–145)
SOURCE SOURCE: NORMAL
SP GR UR STRIP.AUTO: 1.02
UROBILINOGEN UR STRIP.AUTO-MCNC: 0.2 MG/DL
WBC # BLD AUTO: 6.1 K/UL (ref 4.8–10.8)

## 2022-03-17 PROCEDURE — 87086 URINE CULTURE/COLONY COUNT: CPT

## 2022-03-17 PROCEDURE — 85025 COMPLETE CBC W/AUTO DIFF WBC: CPT

## 2022-03-17 PROCEDURE — 86901 BLOOD TYPING SEROLOGIC RH(D): CPT

## 2022-03-17 PROCEDURE — 81003 URINALYSIS AUTO W/O SCOPE: CPT

## 2022-03-17 PROCEDURE — 80053 COMPREHEN METABOLIC PANEL: CPT

## 2022-03-17 PROCEDURE — 99284 EMERGENCY DEPT VISIT MOD MDM: CPT

## 2022-03-17 PROCEDURE — 36415 COLL VENOUS BLD VENIPUNCTURE: CPT

## 2022-03-17 PROCEDURE — 84702 CHORIONIC GONADOTROPIN TEST: CPT

## 2022-03-17 PROCEDURE — 87591 N.GONORRHOEAE DNA AMP PROB: CPT

## 2022-03-17 PROCEDURE — 87491 CHLMYD TRACH DNA AMP PROBE: CPT

## 2022-03-17 PROCEDURE — 83690 ASSAY OF LIPASE: CPT

## 2022-03-17 PROCEDURE — 76801 OB US < 14 WKS SINGLE FETUS: CPT

## 2022-03-17 ASSESSMENT — LIFESTYLE VARIABLES: DO YOU DRINK ALCOHOL: NO

## 2022-03-17 ASSESSMENT — FIBROSIS 4 INDEX: FIB4 SCORE: 0.75

## 2022-03-17 NOTE — DISCHARGE PLANNING
3/17/2022  CHW received order from [facility]. CHW met patient at bedside to discuss women's health follow up appointment. Patient states they have [employer] insurance. CHW attempted to schedule appointment but [facility] on [address], stated that they will reach out to patient to make appointment. CHW advised patient of this and provided clinic contact information via AVS.

## 2022-03-17 NOTE — ED TRIAGE NOTES
"Chief Complaint   Patient presents with   • Abdominal Pain     Pt states she is 6 weeks pregnant. Pt states she had positive HCG x  2  and an US that didn't show any pregnancy. Pt states she had the US last week. Pt here to confirm pregnancy status.    • N/V       ER triage protocols initiated.     /70   Pulse 87   Temp 36.1 °C (97 °F) (Temporal)   Resp 18   Ht 1.651 m (5' 5\")   Wt 76 kg (167 lb 8.8 oz)   SpO2 98%   BMI 27.88 kg/m²     "

## 2022-03-17 NOTE — DISCHARGE INSTRUCTIONS
Follow-up at the [facility] within the next 48 hours.  If you are unable to get an appointment for tomorrow afternoon, please return to the ER for recheck on Saturday.    Return to the ER immediately for any worsening pelvic pain/cramping, for vaginal bleeding, dizziness or lightheadedness, nausea, vomiting, pain with urination, cloudy or foul-smelling urine, fevers, chills, or for any concerns.    Drink plenty of fluids to stay well-hydrated.

## 2022-03-17 NOTE — ED NOTES
BREAK RN:    Pelvic exam completed, vaginal swabs collected and sent to lab for testing. This RN at bedside for chaperone and assist ERP. Pt tolerated procedure well. NAD noted.

## 2022-03-17 NOTE — PROGRESS NOTES
3/17/2022  CHW received order from [facility]. CHW met patient at bedside to discuss women's health follow up appointment. Patient states they have [employer] insurance. CHW attempted to schedule appointment but [facility] on [address], stated that they will reach out to patient to make appointment. CHW advised patient of this and provided clinic contact information via AVS.

## 2022-03-17 NOTE — ED PROVIDER NOTES
"ED Provider Note    Scribed for Denisse Edgar M.D. by Nikhil Grove. 3/17/2022  1:01 PM    Primary care provider: MARISA Voss  Means of arrival: Walk-in  History obtained from: Patient  History limited by: None  CHIEF COMPLAINT  Chief Complaint   Patient presents with   • Abdominal Pain     Pt states she is 6 weeks pregnant. Pt states she had positive HCG x  2  and an US that didn't show any pregnancy. Pt states she had the US last week. Pt here to confirm pregnancy status.    • N/V       HPI  Brooke Crespo is a  36 y.o. female who presents for waxing and waning left-sided pelvic pain onset 3 weeks ago. She describes her pain as \"biting\" and \"cramping. Patient states she believes she is about 6 weeks pregnant.  Last menstrual cycle was February 10, 2022.  It was shortly after that.  That the started trying to get pregnant.  She reports she has had 2 positive HCG tests at [facility] over the last 1 week, but her US last week did not show any pregnancy. She states her HCG qual was 75 when she visited an ED on 3/11/22. 3 days later on , she states her HCG was 400. Patient states she was recommended by [facility] primary [facility] to visit the ED for further evaluation. Patient denies any associated nausea, vomiting, vaginal bleeding, headache, fevers, or chills. Patient denies any history or family history of ectopic pregnancies. She denies any history of tubal surgeries, STDs, or any other major medical history. Her LMP was 2/10/22.       REVIEW OF SYSTEMS  Pertinent positives include left sided abdominal pain, nausea, or vomiting.   Pertinent negatives include no vaginal bleeding, nausea, vomiting, headache, fevers, or chills.   See HPI for further details. All other systems are negative.    PAST MEDICAL HISTORY  No history of STD or tubal surgeries    FAMILY HISTORY  No family history of ectopic pregnancies.    SOCIAL HISTORY  Social History     Tobacco Use   • " "Smoking status: Never Smoker   • Smokeless tobacco: Never Used   Vaping Use   • Vaping Use: Never used   Substance Use Topics   • Alcohol use: Yes     Comment: soc      Social History     Substance and Sexual Activity   Drug Use None noted       SURGICAL HISTORY  Past Surgical History:   Procedure Laterality Date   • LUMPECTOMY     • SEPTAL RECONSTRUCTION         CURRENT MEDICATIONS  Home Medications    **Home medications have not yet been reviewed for this encounter**         ALLERGIES  Allergies   Allergen Reactions   • Strawberry Rash       PHYSICAL EXAM  VITAL SIGNS: /70   Pulse 87   Temp 36.1 °C (97 °F) (Temporal)   Resp 18   Ht 1.651 m (5' 5\")   Wt 76 kg (167 lb 8.8 oz)   SpO2 98%   BMI 27.88 kg/m²      Constitutional: Well developed, well nourished; No acute distress; Non-toxic appearance.   HENT: Normocephalic, atraumatic; Bilateral external ears normal; oropharyngeal examination deferred due to COVID-19 outbreak and lack of oropharyngeal complaint  Eyes: PERRL, EOMI, Conjunctiva normal. No discharge.   Neck:  Supple, nontender midline; No stridor; No nuchal rigidity.   Lymphatic: No cervical lymphadenopathy noted.   Cardiovascular: Regular rate and rhythm without murmurs, rubs, or gallop.   Thorax & Lungs: No respiratory distress, breath sounds clear to auscultation bilaterally without wheezing, rales or rhonchi. Nontender chest wall. No crepitus or subcutaneous air  Abdomen: Soft, nontender, bowel sounds normal. No obvious masses; No pulsatile masses; no rebound, guarding, or peritoneal signs.   : Creamy white vaginal discharge. External genital normal without lesions or vesicles. No blood in the vaginal vault.  No clots.  Cervical os is closed.  No tenderness in midline suprapubic area or bilateral adnexal areas.  No obvious adnexal mass  Skin: Good color; warm and dry without rash or petechia.  Back: Nontender, No CVA tenderness.   Extremities: Distal radial, dorsalis pedis, posterior " tibial pulses are equal bilaterally; No edema; Nontender calves or saphenous, No cyanosis, No clubbing.   Musculoskeletal: Good range of motion in all major joints. No tenderness to palpation or major deformities noted.   Neurologic: Alert & oriented x 4, clear speech.       LABS/RADIOLOGY/PROCEDURES  Results for orders placed or performed during the hospital encounter of 03/17/22   CBC WITH DIFFERENTIAL   Result Value Ref Range    WBC 6.1 4.8 - 10.8 K/uL    RBC 4.52 4.20 - 5.40 M/uL    Hemoglobin 13.3 12.0 - 16.0 g/dL    Hematocrit 40.2 37.0 - 47.0 %    MCV 88.9 81.4 - 97.8 fL    MCH 29.4 27.0 - 33.0 pg    MCHC 33.1 (L) 33.6 - 35.0 g/dL    RDW 41.9 35.9 - 50.0 fL    Platelet Count 266 164 - 446 K/uL    MPV 10.0 9.0 - 12.9 fL    Neutrophils-Polys 65.70 44.00 - 72.00 %    Lymphocytes 22.10 22.00 - 41.00 %    Monocytes 9.70 0.00 - 13.40 %    Eosinophils 2.00 0.00 - 6.90 %    Basophils 0.20 0.00 - 1.80 %    Immature Granulocytes 0.30 0.00 - 0.90 %    Nucleated RBC 0.00 /100 WBC    Neutrophils (Absolute) 4.02 2.00 - 7.15 K/uL    Lymphs (Absolute) 1.35 1.00 - 4.80 K/uL    Monos (Absolute) 0.59 0.00 - 0.85 K/uL    Eos (Absolute) 0.12 0.00 - 0.51 K/uL    Baso (Absolute) 0.01 0.00 - 0.12 K/uL    Immature Granulocytes (abs) 0.02 0.00 - 0.11 K/uL    NRBC (Absolute) 0.00 K/uL   COMP METABOLIC PANEL   Result Value Ref Range    Sodium 140 135 - 145 mmol/L    Potassium 4.1 3.6 - 5.5 mmol/L    Chloride 106 96 - 112 mmol/L    Co2 24 20 - 33 mmol/L    Anion Gap 10.0 7.0 - 16.0    Glucose 97 65 - 99 mg/dL    Bun 10 8 - 22 mg/dL    Creatinine 0.60 0.50 - 1.40 mg/dL    Calcium 9.5 8.5 - 10.5 mg/dL    AST(SGOT) 17 12 - 45 U/L    ALT(SGPT) 22 2 - 50 U/L    Alkaline Phosphatase 54 30 - 99 U/L    Total Bilirubin 0.6 0.1 - 1.5 mg/dL    Albumin 4.4 3.2 - 4.9 g/dL    Total Protein 7.2 6.0 - 8.2 g/dL    Globulin 2.8 1.9 - 3.5 g/dL    A-G Ratio 1.6 g/dL   LIPASE   Result Value Ref Range    Lipase 16 11 - 82 U/L   HCG QUANTITATIVE   Result Value  Ref Range    Bhcg 1080.0 (H) 0.0 - 5.0 mIU/mL   URINALYSIS,CULTURE IF INDICATED    Specimen: Urine, Clean Catch; Blood   Result Value Ref Range    Color Yellow     Character Clear     Specific Gravity 1.016 <1.035    Ph 8.0 5.0 - 8.0    Glucose Negative Negative mg/dL    Ketones Trace (A) Negative mg/dL    Protein Negative Negative mg/dL    Bilirubin Negative Negative    Urobilinogen, Urine 0.2 Negative    Nitrite Negative Negative    Leukocyte Esterase Negative Negative    Occult Blood Negative Negative    Micro Urine Req see below    ESTIMATED GFR   Result Value Ref Range    GFR (CKD-EPI) 119 >60 mL/min/1.73 m 2   RH TYPE FOR RHOGAM FROM E.D.   Result Value Ref Range    Emergency Department Rh Typing POS     Number Of Rh Doses Indicated ZERO    WET PREP    Specimen: Vaginal; Genital   Result Value Ref Range    Significant Indicator NEG     Source GEN     Site VAGINAL     Wet Prep For Parasites       No yeast.  No motile Trichomonas seen.  No clue cells seen.     Chlamydia & N.gonorrhoeae by PCR   Result Value Ref Range    Source Endo/Cervical           US-OB 1ST TRIMESTER WITH TRANSVAGINAL (COMBO)   Final Result         Single intrauterine gestation with only a small gestational sac identified. No fetal pole or yolk sac is appreciated. ANITA by ultrasound is 11/18/2022. Continued follow-up is recommended.          COURSE & MEDICAL DECISION MAKING  Pertinent Labs & Imaging studies reviewed. (See chart for details)    1:01 PM - Patient seen and examined at bedside. Discussed plan of care, including plan to assess for pregnancy and pelvic exam. Patient agrees to the plan of care. Ordered for labs and imaging to evaluate her symptoms.     3:02 PM - Patient was reevaluated at bedside. Discussed lab and radiology results with the patient. I informed the patient she is pregnant. The plan for discharge pending additional labs was discussed. Patient and/or family was given the opportunity to ask any questions. Patient and/or  verbalizes understanding and agreement to this plan of care.      Patient presents to the ER with complaint of persistent left sided pelvic pain which has been waxing and waning in intensity over the last 3 weeks.  She was at [facility] twice last week.  She had a quantitative hCG of 75 on March 11.  She went back 3 days later and had a quantitative hCG of 400 on March 14.  Here in the ER today her quantitative hCG is 1080.  She said that when they did an ultrasound at [facility] 3 days ago there was no intrauterine pregnancy identified.  Since she continued to have left sided pelvic pain, she came here to ER today for further evaluation.  Ultrasound today reveals a single intrauterine gestation with only a small gestational sac identified.  No fetal pole or yolk sac at this time.  Patient's quantitative hCGs are rising appropriately and we are now seeing what appears to be an intrauterine gestation.  She has no tenderness on pelvic examination.  No obvious ectopic pregnancy or free fluid in the pelvis to suggest ectopic.  Patient's vitals are normal and stable.  She is otherwise well-appearing.  I suggested close follow-up in another few days with repeat quantitative hCG and possible repeat ultrasound particularly if she still having discomfort.  I have placed referral to [facility] and and hopeful that the  can have the patient seen tomorrow afternoon for recheck.  Patient is unable to get appointment before the weekend, she is to return to the ER in 48 hours for reevaluation and repeat quantitative hCG.  Patient has been given strict return precautions and discharge instructions.  She is not bleeding.  She is Rh+.  No need for RhoGam.  No signs of urinary tract infection.  Wet mount is negative for clue cells, yeast and trichomonas.  GC chlamydia are pending at the time of this dictation.  Patient understands treatment plan and follow-up and agrees to  follow-up with OB/GYN as instructed.    The patient will return for new or worsening symptoms and is stable at the time of discharge.    The patient is referred to a primary physician for blood pressure management, diabetic screening, and for all other preventative health concerns.    DISPOSITION:  Patient will be discharged home in stable condition.    FOLLOW UP:  [facility] [address] [facility]  [id number]  [place] [address]  [phone]  Call  This clinic will reach out to you to make an appointment. If you havent heard back in 24hrs, please call [phone]. Thank you.       OUTPATIENT MEDICATIONS:  Discharge Medication List as of 3/17/2022  4:36 PM           FINAL IMPRESSION  1. Pregnancy with early threatened  Acute         Nikhil QUINTANILLA (Scribe), am scribing for, and in the presence of, Denisse Edgar M.D..    Electronically signed by: Nikhil Grove (Scribe), 3/17/2022    Denisse QUINTANILLA M.D. personally performed the services described in this documentation, as scribed by Nikhil Grove in my presence, and it is both accurate and complete.    This dictation has been created using voice recognition software. The accuracy of the dictation is limited by the abilities of the software. I expect there may be some errors of grammar and possibly content. I made every attempt to manually correct the errors within my dictation. However, errors related to voice recognition software may still exist and should be interpreted within the appropriate context.    The note accurately reflects work and decisions made by me.  Denisse Edgar M.D.  3/18/2022  12:20 AM

## 2022-03-18 ENCOUNTER — RESEARCH ENCOUNTER (OUTPATIENT)
Dept: MEDICAL GROUP | Facility: PHYSICIAN GROUP | Age: 37
End: 2022-03-18
Payer: COMMERCIAL

## 2022-03-18 ENCOUNTER — HOSPITAL ENCOUNTER (OUTPATIENT)
Dept: LAB | Facility: MEDICAL CENTER | Age: 37
End: 2022-03-18
Attending: OBSTETRICS & GYNECOLOGY
Payer: COMMERCIAL

## 2022-03-18 ENCOUNTER — TELEPHONE (OUTPATIENT)
Dept: OBGYN | Facility: CLINIC | Age: 37
End: 2022-03-18

## 2022-03-18 ENCOUNTER — APPOINTMENT (OUTPATIENT)
Dept: URGENT CARE | Facility: PHYSICIAN GROUP | Age: 37
End: 2022-03-18

## 2022-03-18 DIAGNOSIS — O36.80X0 PREGNANCY, LOCATION UNKNOWN: Primary | ICD-10-CM

## 2022-03-18 DIAGNOSIS — Z00.6 RESEARCH STUDY PATIENT: ICD-10-CM

## 2022-03-18 DIAGNOSIS — O36.80X0 PREGNANCY, LOCATION UNKNOWN: ICD-10-CM

## 2022-03-18 LAB
B-HCG SERPL-ACNC: 1972 MIU/ML (ref 0–5)
C TRACH DNA GENITAL QL NAA+PROBE: NEGATIVE
N GONORRHOEA DNA GENITAL QL NAA+PROBE: NEGATIVE
SPECIMEN SOURCE: NORMAL

## 2022-03-18 PROCEDURE — 36415 COLL VENOUS BLD VENIPUNCTURE: CPT

## 2022-03-18 PROCEDURE — 84702 CHORIONIC GONADOTROPIN TEST: CPT

## 2022-03-18 NOTE — TELEPHONE ENCOUNTER
Notified patient to do HCG lab ordered by Dr. Saldivar, and another member of our team will be reaching out to make a follow up appointment for next week. Patient verbalized understanding.

## 2022-03-19 ENCOUNTER — PATIENT MESSAGE (OUTPATIENT)
Dept: OBGYN | Facility: CLINIC | Age: 37
End: 2022-03-19
Payer: COMMERCIAL

## 2022-03-19 LAB
BACTERIA UR CULT: NORMAL
SIGNIFICANT IND 70042: NORMAL
SITE SITE: NORMAL
SOURCE SOURCE: NORMAL

## 2022-03-19 NOTE — NON-PROVIDER
Pt came in for an injury that happened in 2020 that was not reported at the time. I reached out to [facility] and was told that she cannot be seen for that 2020 injury. We can see her for a current injury for today for her left elbow (they will deny the claim) but not an injury that was in 2020.

## 2022-03-22 ENCOUNTER — PATIENT MESSAGE (OUTPATIENT)
Dept: OBGYN | Facility: CLINIC | Age: 37
End: 2022-03-22
Payer: COMMERCIAL

## 2022-03-23 ENCOUNTER — GYNECOLOGY VISIT (OUTPATIENT)
Dept: OBGYN | Facility: CLINIC | Age: 37
End: 2022-03-23
Payer: COMMERCIAL

## 2022-03-23 VITALS — BODY MASS INDEX: 28.12 KG/M2 | WEIGHT: 169 LBS | DIASTOLIC BLOOD PRESSURE: 68 MMHG | SYSTOLIC BLOOD PRESSURE: 110 MMHG

## 2022-03-23 DIAGNOSIS — O36.80X0 PREGNANCY WITH INCONCLUSIVE FETAL VIABILITY, SINGLE OR UNSPECIFIED FETUS: Primary | ICD-10-CM

## 2022-03-23 PROCEDURE — 76830 TRANSVAGINAL US NON-OB: CPT | Mod: TC | Performed by: OBSTETRICS & GYNECOLOGY

## 2022-03-23 PROCEDURE — 99203 OFFICE O/P NEW LOW 30 MIN: CPT | Mod: 25 | Performed by: OBSTETRICS & GYNECOLOGY

## 2022-03-23 ASSESSMENT — FIBROSIS 4 INDEX: FIB4 SCORE: 0.49

## 2022-03-24 NOTE — PROGRESS NOTES
"CC: Missed menses    Brooke Crespo is a 36 y.o.  who presents presents due to missed menses. Patient's last menstrual period was 02/10/2022..  She is sure of her LMP.  Based on LMP, she is 5w6d today. She was not using anything for birthcontrol.  This is was a planned pregnancy.  She is very nervous with this being her first pregnancy and at her \"age\" so at the first sign of pain or cramping which she recently experienced she presented to the ED on 3/17 (at 4w6d gestation).  Standard workup for early pregnancy / preg of unknown location undertaken.     Results so beta x 2 with appropriate rise and possible early gestational sac in uterus.     She is here today for follow up and next steps.      OB History:    OB History    Para Term  AB Living   1             SAB IAB Ectopic Molar Multiple Live Births                    # Outcome Date GA Lbr Omar/2nd Weight Sex Delivery Anes PTL Lv   1 Current                    Objective:   Vitals:  /68   Wt 76.7 kg (169 lb)   Body mass index is 28.12 kg/m². (Goal BM I>18 <25)  Exam:  General: is in no apparent distress  Psychiatric: appropriate affect, alert and oriented x3, intact judgment and insight.  Respiratory: normal effort  Female GYN: normal female external genitalia without lesions      Procedure:  Transvaginal US performed by me and per my read:    Indication: Amenorrhea, +UPT.     Findings:   Intrauterine gestational sac  Positive yolk sac.   Too small/early to distinguish fetal pole at this time    Impression:   Early IUP  Notable change form prior US with formation of YS suggests normal early pregnancy      A/P:   36 y.o.  here for missed menses.    1. Pregnancy with uncertain viability   - still too early to determine viability but this IS an intrauterine pregnancy and now no concern for ectopic   - offered pt reassurance   - discussed rpt US in 2-3 weeks to document fetal pole     Yoli Saldivar D.O.     "

## 2022-03-24 NOTE — PROGRESS NOTES
Patient no longer needs bloodwork as she was seen in office and has early IUP.  She will return to office in 2-3wks for repeat US

## 2022-03-29 ENCOUNTER — TELEPHONE (OUTPATIENT)
Dept: OBGYN | Facility: CLINIC | Age: 37
End: 2022-03-29
Payer: COMMERCIAL

## 2022-04-09 ENCOUNTER — HOSPITAL ENCOUNTER (EMERGENCY)
Facility: MEDICAL CENTER | Age: 37
End: 2022-04-09
Attending: EMERGENCY MEDICINE
Payer: COMMERCIAL

## 2022-04-09 ENCOUNTER — APPOINTMENT (OUTPATIENT)
Dept: RADIOLOGY | Facility: MEDICAL CENTER | Age: 37
End: 2022-04-09
Attending: EMERGENCY MEDICINE
Payer: COMMERCIAL

## 2022-04-09 VITALS
RESPIRATION RATE: 18 BRPM | DIASTOLIC BLOOD PRESSURE: 58 MMHG | HEIGHT: 65 IN | HEART RATE: 92 BPM | WEIGHT: 165.57 LBS | TEMPERATURE: 98.2 F | OXYGEN SATURATION: 98 % | BODY MASS INDEX: 27.58 KG/M2 | SYSTOLIC BLOOD PRESSURE: 121 MMHG

## 2022-04-09 DIAGNOSIS — R10.2 PELVIC PAIN AFFECTING PREGNANCY IN FIRST TRIMESTER, ANTEPARTUM: ICD-10-CM

## 2022-04-09 DIAGNOSIS — O26.891 PELVIC PAIN AFFECTING PREGNANCY IN FIRST TRIMESTER, ANTEPARTUM: ICD-10-CM

## 2022-04-09 DIAGNOSIS — O20.9 VAGINAL BLEEDING IN PREGNANCY, FIRST TRIMESTER: ICD-10-CM

## 2022-04-09 LAB
ALBUMIN SERPL BCP-MCNC: 4.3 G/DL (ref 3.2–4.9)
ALBUMIN/GLOB SERPL: 1.5 G/DL
ALP SERPL-CCNC: 48 U/L (ref 30–99)
ALT SERPL-CCNC: 22 U/L (ref 2–50)
ANION GAP SERPL CALC-SCNC: 10 MMOL/L (ref 7–16)
APPEARANCE UR: CLEAR
AST SERPL-CCNC: 19 U/L (ref 12–45)
B-HCG SERPL-ACNC: ABNORMAL MIU/ML (ref 0–5)
BASOPHILS # BLD AUTO: 0.2 % (ref 0–1.8)
BASOPHILS # BLD: 0.02 K/UL (ref 0–0.12)
BILIRUB SERPL-MCNC: 0.3 MG/DL (ref 0.1–1.5)
BILIRUB UR QL STRIP.AUTO: NEGATIVE
BUN SERPL-MCNC: 11 MG/DL (ref 8–22)
CALCIUM SERPL-MCNC: 9.4 MG/DL (ref 8.5–10.5)
CHLORIDE SERPL-SCNC: 104 MMOL/L (ref 96–112)
CO2 SERPL-SCNC: 22 MMOL/L (ref 20–33)
COLOR UR: YELLOW
CREAT SERPL-MCNC: 0.45 MG/DL (ref 0.5–1.4)
EOSINOPHIL # BLD AUTO: 0.07 K/UL (ref 0–0.51)
EOSINOPHIL NFR BLD: 0.7 % (ref 0–6.9)
ERYTHROCYTE [DISTWIDTH] IN BLOOD BY AUTOMATED COUNT: 41.2 FL (ref 35.9–50)
GFR SERPLBLD CREATININE-BSD FMLA CKD-EPI: 127 ML/MIN/1.73 M 2
GLOBULIN SER CALC-MCNC: 2.8 G/DL (ref 1.9–3.5)
GLUCOSE SERPL-MCNC: 92 MG/DL (ref 65–99)
GLUCOSE UR STRIP.AUTO-MCNC: NEGATIVE MG/DL
HCT VFR BLD AUTO: 37.4 % (ref 37–47)
HGB BLD-MCNC: 12.9 G/DL (ref 12–16)
IMM GRANULOCYTES # BLD AUTO: 0.04 K/UL (ref 0–0.11)
IMM GRANULOCYTES NFR BLD AUTO: 0.4 % (ref 0–0.9)
KETONES UR STRIP.AUTO-MCNC: NEGATIVE MG/DL
LEUKOCYTE ESTERASE UR QL STRIP.AUTO: NEGATIVE
LIPASE SERPL-CCNC: 16 U/L (ref 11–82)
LYMPHOCYTES # BLD AUTO: 1.39 K/UL (ref 1–4.8)
LYMPHOCYTES NFR BLD: 13.8 % (ref 22–41)
MCH RBC QN AUTO: 30.1 PG (ref 27–33)
MCHC RBC AUTO-ENTMCNC: 34.5 G/DL (ref 33.6–35)
MCV RBC AUTO: 87.4 FL (ref 81.4–97.8)
MICRO URNS: NORMAL
MONOCYTES # BLD AUTO: 0.91 K/UL (ref 0–0.85)
MONOCYTES NFR BLD AUTO: 9.1 % (ref 0–13.4)
NEUTROPHILS # BLD AUTO: 7.61 K/UL (ref 2–7.15)
NEUTROPHILS NFR BLD: 75.8 % (ref 44–72)
NITRITE UR QL STRIP.AUTO: NEGATIVE
NRBC # BLD AUTO: 0 K/UL
NRBC BLD-RTO: 0 /100 WBC
PH UR STRIP.AUTO: 6 [PH] (ref 5–8)
PLATELET # BLD AUTO: 236 K/UL (ref 164–446)
PMV BLD AUTO: 10.1 FL (ref 9–12.9)
POTASSIUM SERPL-SCNC: 4.1 MMOL/L (ref 3.6–5.5)
PROT SERPL-MCNC: 7.1 G/DL (ref 6–8.2)
PROT UR QL STRIP: NEGATIVE MG/DL
RBC # BLD AUTO: 4.28 M/UL (ref 4.2–5.4)
RBC UR QL AUTO: NEGATIVE
SODIUM SERPL-SCNC: 136 MMOL/L (ref 135–145)
SP GR UR STRIP.AUTO: 1.01
UROBILINOGEN UR STRIP.AUTO-MCNC: 0.2 MG/DL
WBC # BLD AUTO: 10 K/UL (ref 4.8–10.8)

## 2022-04-09 PROCEDURE — 80053 COMPREHEN METABOLIC PANEL: CPT

## 2022-04-09 PROCEDURE — 84702 CHORIONIC GONADOTROPIN TEST: CPT

## 2022-04-09 PROCEDURE — 99284 EMERGENCY DEPT VISIT MOD MDM: CPT

## 2022-04-09 PROCEDURE — 83690 ASSAY OF LIPASE: CPT

## 2022-04-09 PROCEDURE — 81003 URINALYSIS AUTO W/O SCOPE: CPT

## 2022-04-09 PROCEDURE — 76801 OB US < 14 WKS SINGLE FETUS: CPT

## 2022-04-09 PROCEDURE — 36415 COLL VENOUS BLD VENIPUNCTURE: CPT

## 2022-04-09 PROCEDURE — 85025 COMPLETE CBC W/AUTO DIFF WBC: CPT

## 2022-04-09 ASSESSMENT — FIBROSIS 4 INDEX: FIB4 SCORE: 0.49

## 2022-04-09 NOTE — ED TRIAGE NOTES
"Chief Complaint   Patient presents with   • Vaginal Discharge     This AM   • Abdominal Pain     Patient is 8 weeks pregnant and is scheduled for first visit next week. She is having cramping in LLQ. Denies nausea       Patient to triage ambulatory with a steady gait, AAOx4, Appropriate precautions in place.     Explained wait time and triage process. Placed back in lobby. Told to notify ED tech or RN of any changes, verbalized understanding.    /75   Pulse 88   Temp 36.7 °C (98 °F) (Temporal)   Resp 14   Ht 1.651 m (5' 5\")   Wt 75.1 kg (165 lb 9.1 oz)   LMP 02/10/2022   SpO2 98%   BMI 27.55 kg/m²     "

## 2022-04-09 NOTE — ED NOTES
ERP to bedside   Simponi Pregnancy And Lactation Text: The risk during pregnancy and breastfeeding is uncertain with this medication.

## 2022-04-09 NOTE — ED PROVIDER NOTES
ED Provider Note    CHIEF COMPLAINT  Chief Complaint   Patient presents with   • Vaginal Discharge     This AM   • Abdominal Pain     Patient is 8 weeks pregnant and is scheduled for first visit next week. She is having cramping in LLQ. Denies nausea       HPI  Brooke Crespo is a 36 y.o. female who presents for evaluation of ongoing pelvic pain in the setting of pregnancy.  G1, P0 at approximately 8 weeks.  Since the onset of pregnancy she has been having left-sided pelvic pain, had multiple ultrasounds, she by report has not yet had more than a gestational sac identified on ultrasound.  Today she noticed a lot of blood in the toilet bowl which is not happened to her prior to today.  No back pain.  No vomiting.  She reports that she has a physically strenuous job, she also was in the middle of moving and lifting heavy boxes.  She offers no other acute complaints at this time.  She has an appointment in 2 weeks with OB/GYN    REVIEW OF SYSTEMS  Negative for fever, rash, chest pain, dyspnea, headache, back pain. All other systems are negative.     PAST MEDICAL HISTORY  History reviewed. No pertinent past medical history.    FAMILY HISTORY  History reviewed. No pertinent family history.    SOCIAL HISTORY  Social History     Tobacco Use   • Smoking status: Never Smoker   • Smokeless tobacco: Never Used   Vaping Use   • Vaping Use: Never used   Substance Use Topics   • Alcohol use: Not Currently     Comment: soc   • Drug use: Never       SURGICAL HISTORY  Past Surgical History:   Procedure Laterality Date   • LUMPECTOMY     • SEPTAL RECONSTRUCTION         CURRENT MEDICATIONS  I personally reviewed the medication list in the charting documentation.     ALLERGIES  Allergies   Allergen Reactions   • Strawberry Rash       MEDICAL RECORD  I have reviewed patient's medical record and pertinent results are listed above.      PHYSICAL EXAM  VITAL SIGNS: /75   Pulse 88   Temp 36.7 °C (98 °F) (Temporal)   " Resp 14   Ht 1.651 m (5' 5\")   Wt 75.1 kg (165 lb 9.1 oz)   LMP 02/10/2022   SpO2 98%   BMI 27.55 kg/m²    Constitutional: Tearful otherwise well appearing patient in no acute distress.  Awake and alert, not toxic nor ill in appearance.  HENT: Normocephalic, no obvious evidence of acute trauma.  Eyes: No scleral icterus. Normal conjunctiva   Neck: Comfortable movement without any obvious restriction in the range of motion.  Cardiovascular: Upon ascultation I appreciate a regular heart rhythm and a normal rate.   Thorax & Lungs: Normal nonlabored respirations.  Upon application of the stethoscope for auscultation I find there to be no associated chest wall tenderness.  I appreciate no wheezing, rhonchi or rales. There is normal air movement.    Abdomen: The abdomen is not visibly distended.  Upon palpation has mild tenderness in left lower quadrant, no rebound, no guarding  Skin: The exposed portions of skin reveal no obvious rash or other abnormalities.  Extremities/Musculoskeletal: No obvious sign of acute trauma. No asymmetric calf tenderness or edema.   Neurologic: Alert & oriented. No focal deficits observed.   Psychiatric: Normal affect appropriate for the clinical situation.    DIAGNOSTIC STUDIES / PROCEDURES    LABS/EKGs  Results for orders placed or performed during the hospital encounter of 04/09/22   CBC WITH DIFFERENTIAL   Result Value Ref Range    WBC 10.0 4.8 - 10.8 K/uL    RBC 4.28 4.20 - 5.40 M/uL    Hemoglobin 12.9 12.0 - 16.0 g/dL    Hematocrit 37.4 37.0 - 47.0 %    MCV 87.4 81.4 - 97.8 fL    MCH 30.1 27.0 - 33.0 pg    MCHC 34.5 33.6 - 35.0 g/dL    RDW 41.2 35.9 - 50.0 fL    Platelet Count 236 164 - 446 K/uL    MPV 10.1 9.0 - 12.9 fL    Neutrophils-Polys 75.80 (H) 44.00 - 72.00 %    Lymphocytes 13.80 (L) 22.00 - 41.00 %    Monocytes 9.10 0.00 - 13.40 %    Eosinophils 0.70 0.00 - 6.90 %    Basophils 0.20 0.00 - 1.80 %    Immature Granulocytes 0.40 0.00 - 0.90 %    Nucleated RBC 0.00 /100 WBC "    Neutrophils (Absolute) 7.61 (H) 2.00 - 7.15 K/uL    Lymphs (Absolute) 1.39 1.00 - 4.80 K/uL    Monos (Absolute) 0.91 (H) 0.00 - 0.85 K/uL    Eos (Absolute) 0.07 0.00 - 0.51 K/uL    Baso (Absolute) 0.02 0.00 - 0.12 K/uL    Immature Granulocytes (abs) 0.04 0.00 - 0.11 K/uL    NRBC (Absolute) 0.00 K/uL   COMP METABOLIC PANEL   Result Value Ref Range    Sodium 136 135 - 145 mmol/L    Potassium 4.1 3.6 - 5.5 mmol/L    Chloride 104 96 - 112 mmol/L    Co2 22 20 - 33 mmol/L    Anion Gap 10.0 7.0 - 16.0    Glucose 92 65 - 99 mg/dL    Bun 11 8 - 22 mg/dL    Creatinine 0.45 (L) 0.50 - 1.40 mg/dL    Calcium 9.4 8.5 - 10.5 mg/dL    AST(SGOT) 19 12 - 45 U/L    ALT(SGPT) 22 2 - 50 U/L    Alkaline Phosphatase 48 30 - 99 U/L    Total Bilirubin 0.3 0.1 - 1.5 mg/dL    Albumin 4.3 3.2 - 4.9 g/dL    Total Protein 7.1 6.0 - 8.2 g/dL    Globulin 2.8 1.9 - 3.5 g/dL    A-G Ratio 1.5 g/dL   LIPASE   Result Value Ref Range    Lipase 16 11 - 82 U/L   HCG QUANTITATIVE   Result Value Ref Range    Bhcg 49140.0 (H) 0.0 - 5.0 mIU/mL   URINALYSIS,CULTURE IF INDICATED    Specimen: Urine, Clean Catch   Result Value Ref Range    Color Yellow     Character Clear     Specific Gravity 1.011 <1.035    Ph 6.0 5.0 - 8.0    Glucose Negative Negative mg/dL    Ketones Negative Negative mg/dL    Protein Negative Negative mg/dL    Bilirubin Negative Negative    Urobilinogen, Urine 0.2 Negative    Nitrite Negative Negative    Leukocyte Esterase Negative Negative    Occult Blood Negative Negative    Micro Urine Req see below    ESTIMATED GFR   Result Value Ref Range    GFR (CKD-EPI) 127 >60 mL/min/1.73 m 2        RADIOLOGY  US-OB 1ST TRIMESTER SINGLE GEST Is the patient pregnant? Yes   Final Result      1.  Single living intrauterine pregnancy at 8 weeks, 0 days estimated gestational age.      2.  Normal appearance of the ovaries and there is no free fluid            COURSE & MEDICAL DECISION MAKING  I have reviewed any medical record information, laboratory  studies and radiographic results as noted above.    Encounter Summary: This is a very pleasant 36 y.o. female who unfortunately required evaluation in the emergency department today with ongoing left pelvic pain in the setting of early pregnancy, new vaginal bleeding that began today.  She has an ultrasound from a couple weeks ago revealing an intrauterine gestational sac, [facility].  She states that the pain has been ongoing but now she has bleeding which is of utmost concern to her.  Her exam is essentially unremarkable otherwise.  Couple weeks ago she had testing for GC/chlamydia which was negative.  She also had an Rh that resulted positive, no need to repeat that.  Will obtain an ultrasound and she will be reevaluated.  Of note the triage ordered blood work included a repeat hCG which resulted at nearly 100,000..  Otherwise the blood work is unremarkable ------- the ultrasound reveals a single intrauterine gestation measuring 8 weeks, consistent with dates.  Normal ovaries without surrounding free fluid.  At this point, return instructions have been provided, she will follow-up as already scheduled with her obstetrician.      DISPOSITION: Discharged home in stable condition      FINAL IMPRESSION  1. Pelvic pain affecting pregnancy in first trimester, antepartum    2. Vaginal bleeding in pregnancy, first trimester           This dictation was created using voice recognition software. The accuracy of the dictation is limited to the abilities of the software. I expect there may be some errors of grammar and possibly content. The nursing notes were reviewed and certain aspects of this information were incorporated into this note.    Electronically signed by: Victoriano Gonzalez M.D., 4/9/2022 1:57 PM

## 2022-04-27 ENCOUNTER — HOSPITAL ENCOUNTER (OUTPATIENT)
Dept: LAB | Facility: MEDICAL CENTER | Age: 37
End: 2022-04-27
Attending: OBSTETRICS & GYNECOLOGY

## 2022-04-27 ENCOUNTER — HOSPITAL ENCOUNTER (OUTPATIENT)
Facility: MEDICAL CENTER | Age: 37
End: 2022-04-27
Attending: OBSTETRICS & GYNECOLOGY
Payer: COMMERCIAL

## 2022-04-27 ENCOUNTER — HOSPITAL ENCOUNTER (OUTPATIENT)
Dept: LAB | Facility: MEDICAL CENTER | Age: 37
End: 2022-04-27
Attending: OBSTETRICS & GYNECOLOGY
Payer: COMMERCIAL

## 2022-04-27 LAB
ABO GROUP BLD: NORMAL
BASOPHILS # BLD AUTO: 0.2 % (ref 0–1.8)
BASOPHILS # BLD: 0.02 K/UL (ref 0–0.12)
BLD GP AB SCN SERPL QL: NORMAL
EOSINOPHIL # BLD AUTO: 0.12 K/UL (ref 0–0.51)
EOSINOPHIL NFR BLD: 1.2 % (ref 0–6.9)
ERYTHROCYTE [DISTWIDTH] IN BLOOD BY AUTOMATED COUNT: 43.7 FL (ref 35.9–50)
HBV SURFACE AG SER QL: NORMAL
HCT VFR BLD AUTO: 39.7 % (ref 37–47)
HCV AB SER QL: NORMAL
HGB BLD-MCNC: 13.3 G/DL (ref 12–16)
HIV 1+2 AB+HIV1 P24 AG SERPL QL IA: NORMAL
IMM GRANULOCYTES # BLD AUTO: 0.04 K/UL (ref 0–0.11)
IMM GRANULOCYTES NFR BLD AUTO: 0.4 % (ref 0–0.9)
LYMPHOCYTES # BLD AUTO: 1.37 K/UL (ref 1–4.8)
LYMPHOCYTES NFR BLD: 13.9 % (ref 22–41)
MCH RBC QN AUTO: 30 PG (ref 27–33)
MCHC RBC AUTO-ENTMCNC: 33.5 G/DL (ref 33.6–35)
MCV RBC AUTO: 89.4 FL (ref 81.4–97.8)
MONOCYTES # BLD AUTO: 0.73 K/UL (ref 0–0.85)
MONOCYTES NFR BLD AUTO: 7.4 % (ref 0–13.4)
NEUTROPHILS # BLD AUTO: 7.57 K/UL (ref 2–7.15)
NEUTROPHILS NFR BLD: 76.9 % (ref 44–72)
NRBC # BLD AUTO: 0 K/UL
NRBC BLD-RTO: 0 /100 WBC
PLATELET # BLD AUTO: 273 K/UL (ref 164–446)
PMV BLD AUTO: 10.8 FL (ref 9–12.9)
RBC # BLD AUTO: 4.44 M/UL (ref 4.2–5.4)
RH BLD: NORMAL
RUBV AB SER QL: 413 IU/ML
T PALLIDUM AB SER QL IA: NORMAL
T4 FREE SERPL-MCNC: 1.19 NG/DL (ref 0.93–1.7)
TSH SERPL DL<=0.005 MIU/L-ACNC: 1.55 UIU/ML (ref 0.38–5.33)
WBC # BLD AUTO: 9.9 K/UL (ref 4.8–10.8)

## 2022-04-27 PROCEDURE — 87340 HEPATITIS B SURFACE AG IA: CPT

## 2022-04-27 PROCEDURE — 86850 RBC ANTIBODY SCREEN: CPT

## 2022-04-27 PROCEDURE — 36415 COLL VENOUS BLD VENIPUNCTURE: CPT

## 2022-04-27 PROCEDURE — 86780 TREPONEMA PALLIDUM: CPT

## 2022-04-27 PROCEDURE — 84439 ASSAY OF FREE THYROXINE: CPT

## 2022-04-27 PROCEDURE — 86762 RUBELLA ANTIBODY: CPT

## 2022-04-27 PROCEDURE — 86803 HEPATITIS C AB TEST: CPT

## 2022-04-27 PROCEDURE — 87389 HIV-1 AG W/HIV-1&-2 AB AG IA: CPT

## 2022-04-27 PROCEDURE — 86900 BLOOD TYPING SEROLOGIC ABO: CPT

## 2022-04-27 PROCEDURE — 85025 COMPLETE CBC W/AUTO DIFF WBC: CPT

## 2022-04-27 PROCEDURE — 87086 URINE CULTURE/COLONY COUNT: CPT

## 2022-04-27 PROCEDURE — 86901 BLOOD TYPING SEROLOGIC RH(D): CPT

## 2022-04-27 PROCEDURE — 84443 ASSAY THYROID STIM HORMONE: CPT

## 2022-04-27 PROCEDURE — 88175 CYTOPATH C/V AUTO FLUID REDO: CPT

## 2022-04-28 LAB — CYTOLOGY REG CYTOL: NORMAL

## 2022-04-29 LAB
BACTERIA UR CULT: NORMAL
SIGNIFICANT IND 70042: NORMAL
SITE SITE: NORMAL
SOURCE SOURCE: NORMAL

## 2022-05-15 LAB
APOB+LDLR+PCSK9 GENE MUT ANL BLD/T: NOT DETECTED
BRCA1+BRCA2 DEL+DUP + FULL MUT ANL BLD/T: NOT DETECTED
MLH1+MSH2+MSH6+PMS2 GN DEL+DUP+FUL M: NOT DETECTED

## 2022-06-01 ENCOUNTER — HOSPITAL ENCOUNTER (OUTPATIENT)
Facility: MEDICAL CENTER | Age: 37
End: 2022-06-01
Attending: OBSTETRICS & GYNECOLOGY
Payer: COMMERCIAL

## 2022-06-01 PROCEDURE — 82105 ALPHA-FETOPROTEIN SERUM: CPT

## 2022-06-01 PROCEDURE — 36415 COLL VENOUS BLD VENIPUNCTURE: CPT

## 2022-06-04 LAB
# FETUSES US: NORMAL
AFP MOM SERPL: 1.27
AFP SERPL-MCNC: 40 NG/ML
AGE - REPORTED: 37.6 YR
CURRENT SMOKER: NO
FAMILY MEMBER DISEASES HX: NO
GA METHOD: NORMAL
GA: NORMAL WK
IDDM PATIENT QL: NO
INTEGRATED SCN PATIENT-IMP: NORMAL
SPECIMEN DRAWN SERPL: NORMAL

## 2022-08-09 ENCOUNTER — HOSPITAL ENCOUNTER (OUTPATIENT)
Facility: MEDICAL CENTER | Age: 37
End: 2022-08-09
Attending: OBSTETRICS & GYNECOLOGY
Payer: COMMERCIAL

## 2022-08-09 LAB
GLUCOSE 1H P 50 G GLC PO SERPL-MCNC: 119 MG/DL (ref 70–139)
HCT VFR BLD AUTO: 35.9 % (ref 37–47)
HGB BLD-MCNC: 11.9 G/DL (ref 12–16)
PLATELET # BLD AUTO: 247 K/UL (ref 164–446)
T PALLIDUM AB SER QL IA: NORMAL

## 2022-08-09 PROCEDURE — 85014 HEMATOCRIT: CPT

## 2022-08-09 PROCEDURE — 36415 COLL VENOUS BLD VENIPUNCTURE: CPT

## 2022-08-09 PROCEDURE — 82950 GLUCOSE TEST: CPT

## 2022-08-09 PROCEDURE — 85018 HEMOGLOBIN: CPT

## 2022-08-09 PROCEDURE — 85049 AUTOMATED PLATELET COUNT: CPT

## 2022-08-09 PROCEDURE — 86780 TREPONEMA PALLIDUM: CPT

## 2022-08-10 ENCOUNTER — APPOINTMENT (OUTPATIENT)
Dept: RADIOLOGY | Facility: MEDICAL CENTER | Age: 37
End: 2022-08-10
Attending: OBSTETRICS & GYNECOLOGY
Payer: COMMERCIAL

## 2022-08-10 ENCOUNTER — HOSPITAL ENCOUNTER (EMERGENCY)
Facility: MEDICAL CENTER | Age: 37
End: 2022-08-10
Attending: OBSTETRICS & GYNECOLOGY | Admitting: OBSTETRICS & GYNECOLOGY
Payer: COMMERCIAL

## 2022-08-10 VITALS
HEART RATE: 102 BPM | DIASTOLIC BLOOD PRESSURE: 81 MMHG | OXYGEN SATURATION: 96 % | RESPIRATION RATE: 18 BRPM | WEIGHT: 179 LBS | BODY MASS INDEX: 29.82 KG/M2 | SYSTOLIC BLOOD PRESSURE: 126 MMHG | HEIGHT: 65 IN | TEMPERATURE: 98.1 F

## 2022-08-10 LAB — A1 MICROGLOB PLACENTAL VAG QL: NEGATIVE

## 2022-08-10 PROCEDURE — 302449 STATCHG TRIAGE ONLY (STATISTIC)

## 2022-08-10 PROCEDURE — 76815 OB US LIMITED FETUS(S): CPT

## 2022-08-10 PROCEDURE — 84112 EVAL AMNIOTIC FLUID PROTEIN: CPT

## 2022-08-10 ASSESSMENT — PAIN SCALES - GENERAL: PAINLEVEL: 0 - NO PAIN

## 2022-08-10 ASSESSMENT — FIBROSIS 4 INDEX: FIB4 SCORE: 0.61

## 2022-08-10 NOTE — PROGRESS NOTES
"Pt presents to L&D via [facility] with complaints of vaginal pressure and possible LOF. Pt escorted to LDA 1 for assessment.     0924 TOCO and EFM applied, VSS. Pt reports +FM, denies VB or UC's. Pt states that she was at work this morning when she got up to use there restroom, when pt removed her underwear she noted a wet spot. Pt wiped and reports it was a light yellowish color \"like my pee\". Pt also has some pelvic pressure. Pt is tearful and worried about delivering a baby this , pt reassured that she's in a safe place and we'll make sure both her and her baby are safe.     0955 Dr. Parry updated on pt status, orders for amnisure and ANYA.     1028 US at bedside.     1055 US WNL, Dr. Parry updated, orders for discharge received.     1058 RN at bedside, extensive  labor precautions given and all questions answered. Pt reassured and encouraged to follow up with Dr. Parry or here with further concerns.     1105 Pt discharged home in stable condition.   "

## 2022-10-03 ENCOUNTER — HOSPITAL ENCOUNTER (OUTPATIENT)
Facility: MEDICAL CENTER | Age: 37
End: 2022-10-03
Attending: OBSTETRICS & GYNECOLOGY
Payer: COMMERCIAL

## 2022-10-03 LAB
BASOPHILS # BLD AUTO: 0.3 % (ref 0–1.8)
BASOPHILS # BLD: 0.02 K/UL (ref 0–0.12)
EOSINOPHIL # BLD AUTO: 0.13 K/UL (ref 0–0.51)
EOSINOPHIL NFR BLD: 1.7 % (ref 0–6.9)
ERYTHROCYTE [DISTWIDTH] IN BLOOD BY AUTOMATED COUNT: 42.3 FL (ref 35.9–50)
HCT VFR BLD AUTO: 33.4 % (ref 37–47)
HGB BLD-MCNC: 11.1 G/DL (ref 12–16)
IMM GRANULOCYTES # BLD AUTO: 0.06 K/UL (ref 0–0.11)
IMM GRANULOCYTES NFR BLD AUTO: 0.8 % (ref 0–0.9)
LYMPHOCYTES # BLD AUTO: 1.16 K/UL (ref 1–4.8)
LYMPHOCYTES NFR BLD: 15 % (ref 22–41)
MCH RBC QN AUTO: 29.4 PG (ref 27–33)
MCHC RBC AUTO-ENTMCNC: 33.2 G/DL (ref 33.6–35)
MCV RBC AUTO: 88.6 FL (ref 81.4–97.8)
MONOCYTES # BLD AUTO: 0.82 K/UL (ref 0–0.85)
MONOCYTES NFR BLD AUTO: 10.6 % (ref 0–13.4)
NEUTROPHILS # BLD AUTO: 5.54 K/UL (ref 2–7.15)
NEUTROPHILS NFR BLD: 71.6 % (ref 44–72)
NRBC # BLD AUTO: 0 K/UL
NRBC BLD-RTO: 0 /100 WBC
PLATELET # BLD AUTO: 202 K/UL (ref 164–446)
PMV BLD AUTO: 11.7 FL (ref 9–12.9)
RBC # BLD AUTO: 3.77 M/UL (ref 4.2–5.4)
WBC # BLD AUTO: 7.7 K/UL (ref 4.8–10.8)

## 2022-10-03 PROCEDURE — 36415 COLL VENOUS BLD VENIPUNCTURE: CPT

## 2022-10-03 PROCEDURE — 85025 COMPLETE CBC W/AUTO DIFF WBC: CPT

## 2022-10-10 ENCOUNTER — HOSPITAL ENCOUNTER (OUTPATIENT)
Dept: LAB | Facility: MEDICAL CENTER | Age: 37
End: 2022-10-10
Attending: OBSTETRICS & GYNECOLOGY

## 2022-10-10 ENCOUNTER — HOSPITAL ENCOUNTER (OUTPATIENT)
Facility: MEDICAL CENTER | Age: 37
End: 2022-10-10
Attending: OBSTETRICS & GYNECOLOGY
Payer: COMMERCIAL

## 2022-10-10 PROCEDURE — 87081 CULTURE SCREEN ONLY: CPT

## 2022-10-10 PROCEDURE — 87150 DNA/RNA AMPLIFIED PROBE: CPT

## 2022-10-11 LAB — GP B STREP DNA SPEC QL NAA+PROBE: NEGATIVE

## 2022-11-09 ENCOUNTER — HOSPITAL ENCOUNTER (EMERGENCY)
Facility: MEDICAL CENTER | Age: 37
End: 2022-11-09
Attending: OBSTETRICS & GYNECOLOGY | Admitting: OBSTETRICS & GYNECOLOGY
Payer: COMMERCIAL

## 2022-11-09 VITALS
WEIGHT: 194 LBS | OXYGEN SATURATION: 96 % | HEART RATE: 103 BPM | RESPIRATION RATE: 18 BRPM | BODY MASS INDEX: 32.32 KG/M2 | SYSTOLIC BLOOD PRESSURE: 109 MMHG | DIASTOLIC BLOOD PRESSURE: 73 MMHG | HEIGHT: 65 IN

## 2022-11-09 LAB
A1 MICROGLOB PLACENTAL VAG QL: NEGATIVE
CRYSTALS AMN MICRO: NORMAL

## 2022-11-09 PROCEDURE — 302449 STATCHG TRIAGE ONLY (STATISTIC)

## 2022-11-09 PROCEDURE — 84112 EVAL AMNIOTIC FLUID PROTEIN: CPT

## 2022-11-09 PROCEDURE — 59025 FETAL NON-STRESS TEST: CPT

## 2022-11-09 PROCEDURE — 89060 EXAM SYNOVIAL FLUID CRYSTALS: CPT

## 2022-11-09 ASSESSMENT — PAIN SCALES - GENERAL: PAINLEVEL: 0 - NO PAIN

## 2022-11-09 ASSESSMENT — FIBROSIS 4 INDEX: FIB4 SCORE: 0.74

## 2022-11-10 NOTE — PROGRESS NOTES
Patient discharged home at this time. Labor precautions and warning signs reviewed at length with patient. Voices understanding to all. Discharged ambulatory at [facility] time

## 2022-11-24 ENCOUNTER — ANESTHESIA (OUTPATIENT)
Dept: OBGYN | Facility: MEDICAL CENTER | Age: 37
End: 2022-11-24
Payer: COMMERCIAL

## 2022-11-24 ENCOUNTER — ANESTHESIA EVENT (OUTPATIENT)
Dept: OBGYN | Facility: MEDICAL CENTER | Age: 37
End: 2022-11-24
Payer: COMMERCIAL

## 2022-11-24 ENCOUNTER — APPOINTMENT (OUTPATIENT)
Dept: OBGYN | Facility: MEDICAL CENTER | Age: 37
End: 2022-11-24
Attending: OBSTETRICS & GYNECOLOGY
Payer: COMMERCIAL

## 2022-11-24 ENCOUNTER — HOSPITAL ENCOUNTER (INPATIENT)
Facility: MEDICAL CENTER | Age: 37
LOS: 3 days | End: 2022-11-27
Attending: OBSTETRICS & GYNECOLOGY | Admitting: OBSTETRICS & GYNECOLOGY
Payer: COMMERCIAL

## 2022-11-24 DIAGNOSIS — G89.18 POST-OP PAIN: ICD-10-CM

## 2022-11-24 DIAGNOSIS — Z91.89 AT RISK FOR BREASTFEEDING DIFFICULTY: Primary | ICD-10-CM

## 2022-11-24 LAB
BASOPHILS # BLD AUTO: 0.1 % (ref 0–1.8)
BASOPHILS # BLD: 0.01 K/UL (ref 0–0.12)
EOSINOPHIL # BLD AUTO: 0.07 K/UL (ref 0–0.51)
EOSINOPHIL NFR BLD: 0.8 % (ref 0–6.9)
ERYTHROCYTE [DISTWIDTH] IN BLOOD BY AUTOMATED COUNT: 47.2 FL (ref 35.9–50)
HCT VFR BLD AUTO: 32.7 % (ref 37–47)
HGB BLD-MCNC: 10.8 G/DL (ref 12–16)
HOLDING TUBE BB 8507: NORMAL
IMM GRANULOCYTES # BLD AUTO: 0.04 K/UL (ref 0–0.11)
IMM GRANULOCYTES NFR BLD AUTO: 0.5 % (ref 0–0.9)
LYMPHOCYTES # BLD AUTO: 0.86 K/UL (ref 1–4.8)
LYMPHOCYTES NFR BLD: 10.2 % (ref 22–41)
MCH RBC QN AUTO: 28.6 PG (ref 27–33)
MCHC RBC AUTO-ENTMCNC: 33 G/DL (ref 33.6–35)
MCV RBC AUTO: 86.7 FL (ref 81.4–97.8)
MONOCYTES # BLD AUTO: 0.67 K/UL (ref 0–0.85)
MONOCYTES NFR BLD AUTO: 7.9 % (ref 0–13.4)
NEUTROPHILS # BLD AUTO: 6.82 K/UL (ref 2–7.15)
NEUTROPHILS NFR BLD: 80.5 % (ref 44–72)
NRBC # BLD AUTO: 0 K/UL
NRBC BLD-RTO: 0 /100 WBC
PLATELET # BLD AUTO: 162 K/UL (ref 164–446)
PMV BLD AUTO: 11.7 FL (ref 9–12.9)
RBC # BLD AUTO: 3.77 M/UL (ref 4.2–5.4)
T PALLIDUM AB SER QL IA: NORMAL
WBC # BLD AUTO: 8.5 K/UL (ref 4.8–10.8)

## 2022-11-24 PROCEDURE — 700102 HCHG RX REV CODE 250 W/ 637 OVERRIDE(OP): Performed by: OBSTETRICS & GYNECOLOGY

## 2022-11-24 PROCEDURE — 85025 COMPLETE CBC W/AUTO DIFF WBC: CPT

## 2022-11-24 PROCEDURE — 3E0P7VZ INTRODUCTION OF HORMONE INTO FEMALE REPRODUCTIVE, VIA NATURAL OR ARTIFICIAL OPENING: ICD-10-PCS | Performed by: OBSTETRICS & GYNECOLOGY

## 2022-11-24 PROCEDURE — 86780 TREPONEMA PALLIDUM: CPT

## 2022-11-24 PROCEDURE — 770002 HCHG ROOM/CARE - OB PRIVATE (112)

## 2022-11-24 PROCEDURE — 700105 HCHG RX REV CODE 258: Performed by: OBSTETRICS & GYNECOLOGY

## 2022-11-24 PROCEDURE — 700101 HCHG RX REV CODE 250: Performed by: STUDENT IN AN ORGANIZED HEALTH CARE EDUCATION/TRAINING PROGRAM

## 2022-11-24 PROCEDURE — 700111 HCHG RX REV CODE 636 W/ 250 OVERRIDE (IP)

## 2022-11-24 PROCEDURE — 3E033VJ INTRODUCTION OF OTHER HORMONE INTO PERIPHERAL VEIN, PERCUTANEOUS APPROACH: ICD-10-PCS | Performed by: OBSTETRICS & GYNECOLOGY

## 2022-11-24 PROCEDURE — 36415 COLL VENOUS BLD VENIPUNCTURE: CPT

## 2022-11-24 PROCEDURE — 303615 HCHG EPIDURAL/SPINAL ANESTHESIA FOR LABOR

## 2022-11-24 PROCEDURE — 01967 NEURAXL LBR ANES VAG DLVR: CPT | Performed by: STUDENT IN AN ORGANIZED HEALTH CARE EDUCATION/TRAINING PROGRAM

## 2022-11-24 PROCEDURE — A9270 NON-COVERED ITEM OR SERVICE: HCPCS | Performed by: OBSTETRICS & GYNECOLOGY

## 2022-11-24 PROCEDURE — 700111 HCHG RX REV CODE 636 W/ 250 OVERRIDE (IP): Performed by: STUDENT IN AN ORGANIZED HEALTH CARE EDUCATION/TRAINING PROGRAM

## 2022-11-24 RX ORDER — ONDANSETRON 2 MG/ML
4 INJECTION INTRAMUSCULAR; INTRAVENOUS EVERY 6 HOURS PRN
Status: DISCONTINUED | OUTPATIENT
Start: 2022-11-24 | End: 2022-11-25 | Stop reason: HOSPADM

## 2022-11-24 RX ORDER — METHYLERGONOVINE MALEATE 0.2 MG/ML
0.2 INJECTION INTRAVENOUS
Status: DISCONTINUED | OUTPATIENT
Start: 2022-11-24 | End: 2022-11-25

## 2022-11-24 RX ORDER — ALUMINA, MAGNESIA, AND SIMETHICONE 2400; 2400; 240 MG/30ML; MG/30ML; MG/30ML
30 SUSPENSION ORAL EVERY 6 HOURS PRN
Status: DISCONTINUED | OUTPATIENT
Start: 2022-11-24 | End: 2022-11-25

## 2022-11-24 RX ORDER — OXYTOCIN 10 [USP'U]/ML
10 INJECTION, SOLUTION INTRAMUSCULAR; INTRAVENOUS
Status: DISCONTINUED | OUTPATIENT
Start: 2022-11-24 | End: 2022-11-25 | Stop reason: HOSPADM

## 2022-11-24 RX ORDER — HYDROCODONE BITARTRATE AND ACETAMINOPHEN 5; 325 MG/1; MG/1
1 TABLET ORAL EVERY 4 HOURS PRN
Status: DISCONTINUED | OUTPATIENT
Start: 2022-11-24 | End: 2022-11-25 | Stop reason: HOSPADM

## 2022-11-24 RX ORDER — TERBUTALINE SULFATE 1 MG/ML
0.25 INJECTION, SOLUTION SUBCUTANEOUS
Status: DISCONTINUED | OUTPATIENT
Start: 2022-11-24 | End: 2022-11-25

## 2022-11-24 RX ORDER — IBUPROFEN 600 MG/1
600 TABLET ORAL
Status: DISCONTINUED | OUTPATIENT
Start: 2022-11-24 | End: 2022-11-25 | Stop reason: HOSPADM

## 2022-11-24 RX ORDER — ACETAMINOPHEN 325 MG/1
325 TABLET ORAL
Status: DISCONTINUED | OUTPATIENT
Start: 2022-11-24 | End: 2022-11-25 | Stop reason: HOSPADM

## 2022-11-24 RX ORDER — ROPIVACAINE HYDROCHLORIDE 2 MG/ML
INJECTION, SOLUTION EPIDURAL; INFILTRATION; PERINEURAL
Status: COMPLETED
Start: 2022-11-24 | End: 2022-11-24

## 2022-11-24 RX ORDER — LIDOCAINE HYDROCHLORIDE 10 MG/ML
20 INJECTION, SOLUTION INFILTRATION; PERINEURAL
Status: DISCONTINUED | OUTPATIENT
Start: 2022-11-24 | End: 2022-11-25

## 2022-11-24 RX ORDER — DEXTROSE, SODIUM CHLORIDE, SODIUM LACTATE, POTASSIUM CHLORIDE, AND CALCIUM CHLORIDE 5; .6; .31; .03; .02 G/100ML; G/100ML; G/100ML; G/100ML; G/100ML
INJECTION, SOLUTION INTRAVENOUS CONTINUOUS
Status: DISCONTINUED | OUTPATIENT
Start: 2022-11-24 | End: 2022-11-27 | Stop reason: HOSPADM

## 2022-11-24 RX ORDER — HYDROCODONE BITARTRATE AND ACETAMINOPHEN 10; 325 MG/1; MG/1
1 TABLET ORAL EVERY 4 HOURS PRN
Status: DISCONTINUED | OUTPATIENT
Start: 2022-11-24 | End: 2022-11-25 | Stop reason: HOSPADM

## 2022-11-24 RX ORDER — CITRIC ACID/SODIUM CITRATE 334-500MG
30 SOLUTION, ORAL ORAL EVERY 6 HOURS PRN
Status: DISCONTINUED | OUTPATIENT
Start: 2022-11-24 | End: 2022-11-25

## 2022-11-24 RX ORDER — SODIUM CHLORIDE, SODIUM LACTATE, POTASSIUM CHLORIDE, CALCIUM CHLORIDE 600; 310; 30; 20 MG/100ML; MG/100ML; MG/100ML; MG/100ML
1000 INJECTION, SOLUTION INTRAVENOUS CONTINUOUS
Status: ACTIVE | OUTPATIENT
Start: 2022-11-24 | End: 2022-11-24

## 2022-11-24 RX ORDER — LIDOCAINE HYDROCHLORIDE AND EPINEPHRINE 15; 5 MG/ML; UG/ML
INJECTION, SOLUTION EPIDURAL
Status: COMPLETED | OUTPATIENT
Start: 2022-11-24 | End: 2022-11-24

## 2022-11-24 RX ORDER — MISOPROSTOL 200 UG/1
800 TABLET ORAL
Status: DISCONTINUED | OUTPATIENT
Start: 2022-11-24 | End: 2022-11-25 | Stop reason: HOSPADM

## 2022-11-24 RX ORDER — BUPIVACAINE HYDROCHLORIDE 2.5 MG/ML
INJECTION, SOLUTION EPIDURAL; INFILTRATION; INTRACAUDAL
Status: ACTIVE
Start: 2022-11-24 | End: 2022-11-25

## 2022-11-24 RX ORDER — ONDANSETRON 4 MG/1
4 TABLET, ORALLY DISINTEGRATING ORAL EVERY 6 HOURS PRN
Status: DISCONTINUED | OUTPATIENT
Start: 2022-11-24 | End: 2022-11-25 | Stop reason: HOSPADM

## 2022-11-24 RX ADMIN — LIDOCAINE HYDROCHLORIDE,EPINEPHRINE BITARTRATE 5 ML: 15; .005 INJECTION, SOLUTION EPIDURAL; INFILTRATION; INTRACAUDAL; PERINEURAL at 20:56

## 2022-11-24 RX ADMIN — ROPIVACAINE HYDROCHLORIDE: 5 INJECTION, SOLUTION EPIDURAL; INFILTRATION; PERINEURAL at 21:30

## 2022-11-24 RX ADMIN — MISOPROSTOL 25 MCG: 100 TABLET ORAL at 16:15

## 2022-11-24 RX ADMIN — ROPIVACAINE HYDROCHLORIDE: 2 INJECTION, SOLUTION EPIDURAL; INFILTRATION; PERINEURAL at 21:30

## 2022-11-24 RX ADMIN — SODIUM CHLORIDE, POTASSIUM CHLORIDE, SODIUM LACTATE AND CALCIUM CHLORIDE 1000 ML: 600; 310; 30; 20 INJECTION, SOLUTION INTRAVENOUS at 14:23

## 2022-11-24 RX ADMIN — MISOPROSTOL 25 MCG: 100 TABLET ORAL at 10:50

## 2022-11-24 RX ADMIN — SODIUM CHLORIDE, POTASSIUM CHLORIDE, SODIUM LACTATE AND CALCIUM CHLORIDE 1000 ML: 600; 310; 30; 20 INJECTION, SOLUTION INTRAVENOUS at 10:42

## 2022-11-24 RX ADMIN — BUPIVACAINE HYDROCHLORIDE 10 ML: 2.5 INJECTION, SOLUTION EPIDURAL; INFILTRATION; INTRACAUDAL; PERINEURAL at 20:56

## 2022-11-24 ASSESSMENT — FIBROSIS 4 INDEX: FIB4 SCORE: 0.74

## 2022-11-24 ASSESSMENT — LIFESTYLE VARIABLES
HAVE PEOPLE ANNOYED YOU BY CRITICIZING YOUR DRINKING: NO
TOTAL SCORE: 0
HAVE YOU EVER FELT YOU SHOULD CUT DOWN ON YOUR DRINKING: NO
EVER FELT BAD OR GUILTY ABOUT YOUR DRINKING: NO
HOW MANY TIMES IN THE PAST YEAR HAVE YOU HAD 5 OR MORE DRINKS IN A DAY: 0
AVERAGE NUMBER OF DAYS PER WEEK YOU HAVE A DRINK CONTAINING ALCOHOL: 0
DOES PATIENT WANT TO STOP DRINKING: NO
ALCOHOL_USE: NO
ON A TYPICAL DAY WHEN YOU DRINK ALCOHOL HOW MANY DRINKS DO YOU HAVE: 0
EVER HAD A DRINK FIRST THING IN THE MORNING TO STEADY YOUR NERVES TO GET RID OF A HANGOVER: NO
CONSUMPTION TOTAL: NEGATIVE

## 2022-11-24 ASSESSMENT — PAIN SCALES - GENERAL: PAINLEVEL: 2

## 2022-11-24 ASSESSMENT — PATIENT HEALTH QUESTIONNAIRE - PHQ9
2. FEELING DOWN, DEPRESSED, IRRITABLE, OR HOPELESS: NOT AT ALL
1. LITTLE INTEREST OR PLEASURE IN DOING THINGS: NOT AT ALL
SUM OF ALL RESPONSES TO PHQ9 QUESTIONS 1 AND 2: 0

## 2022-11-24 ASSESSMENT — PAIN DESCRIPTION - PAIN TYPE: TYPE: ACUTE PAIN

## 2022-11-24 NOTE — PROGRESS NOTES
"0730 - Pt arrived to unit for IOL due to post dates accompanied by FOBRonen. Pt placed on EFM and TOCO to monitor for fetal well being and uterine activity.   Pt denies LOF, denies VB. Reports some \"tightening\" when she walks for several weeks now. Pt reports losing her mucous plug yesterday. Reports normal FM. VSS. SVE per flowsheets. FOB asks about cord blood banking. Education and website provided. Pt and FOB discussing and RN will move forward with IV placement and induction with cytotec after decision is made. Dr. Parry updated.    0945 - Pt called out, has decided not to move forward with cord blood collection after researching and discussing with FOB.     1050- Cytotec placed PV per MAR. Pt instructed to remain laying down for 1 hour post-placement. All questions answered. Dr. Parry updated    1450- Rn to bedside for SVE. Pt has family at bedside and requests to wait for a few minutes to assess cervical change.    1457: SVE per flowsheets. Dr. Parry updated. Will monitor Ucs for 30 min and update Brinda at that time to address POC.    1528 - over 30 min, greater than 12 UCs. Dr. Parry updates, MD will come to bedside to assess for possible subsequent cytotec placement.    1645 - Dr. Parry to bedside. Cytotec #2 PV placed per MAR    1713 - SROM at 1713, moderate clear fluid. Cytotec dislodged into pt bed with rupture. Dr. Parry updated.    1836 - Pt is more uncomfortable. Epidural discussed, as well as alternate pain management options. Ucs moderate on palpation. Dr. Parry updated.    1900 - Report to Jana GARIBAY. Handed pt over in stable condition.      "

## 2022-11-24 NOTE — H&P
DATE OF ADMISSION:  2022     CHIEF COMPLAINT:  1.  A 41 and 0/7 week gestation, undelivered.  2.  Unfavorable cervix.     HISTORY OF PRESENT ILLNESS:  The patient is a 37-year-old lady,  1,   para 0.  Her ANITA is 2022 making her EGA 41 and 0/7 weeks.  She comes for   postdates induction.  Risks and benefits were discussed. Cervical ripening   with misoprostol has already been initiated.  Fetal monitoring is reassuring.    She is group B strep negative.     PRENATAL CARE:  Blood type is A positive.  A 14 condition recessive gene   screen was negative.  Thyroid functions were normal.  Cell-free DNA screening   was reassuring.  Maternal serum alpha-fetoprotein was reassuring.  Ultrasounds   have revealed normal fetal anatomy and growth.  She is group B strep   negative.  She had  nonstress tests because of COVID-19 infection during the   2nd trimester, with persistently reassuring results.  All her blood pressures were   normal.  Her total pregnancy weight gain was 31 pounds.     OBSTETRICAL HISTORY:  G1.     PAST MEDICAL HISTORY:  Positive for COVID-19 in the second trimester.     PAST SURGICAL HISTORY:  1.  Breast reduction, .  2.  Liposuction .     ALLERGIES:  STRAWBERRIES HAVE RESULTED IN ANAPHYLAXIS.  SHE STATES SHE IS   ALLERGIC TO BAND-AID ADHESIVE.     MEDICATIONS:  Prenatal vitamin daily.     SOCIAL HISTORY:  She is  to Ronen.  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  She denies alcohol, tobacco or drug consumption.     PHYSICAL EXAMINATION:  VITAL SIGNS:  Temperature 97.1, pulse 106, respirations 20, /67.  HEENT:  Normal.  LUNGS:  Clear to auscultation.  HEART:  Sounds normal.  ABDOMEN:  Nontender.  Fundal height is appropriate.  No hepatosplenomegaly.  PELVIC:  Cervix 1 cm dilated, 50% effaced with the baby's head at -2 station.  EXTREMITIES:  No edema.  Homans' negative.  DTRs normal.     DIAGNOSES:  1.  A 41 and 0/7th week gestation.  2.   Unfavorable cervix.  3.  Group B streptococcus negative.     PLAN:  She has already received her first dose of intravaginal misoprostol.   Further doses or labor-inducing modalities if indicated.  We will pursue delivery.        ______________________________  MD JORGE Mayfield/GARETT    DD:  11/24/2022 11:26  DT:  11/24/2022 11:43    Job#:  [id number]

## 2022-11-24 NOTE — CARE PLAN
The patient is Stable - Low risk of patient condition declining or worsening         Progress made toward(s) clinical / shift goals:    Problem: Risk for Infection and Impaired Wound Healing  Goal: Patient will remain free from infection  Outcome: Progressing   Patient is afebrile. VS monitored per orders.   Problem: Pain  Goal: Patient's pain will be alleviated or reduced to the patient’s comfort goal  Outcome: Progressing   Pt educated on pain management options. Understands the use of both pharmacological and non-pharmacological pain interventions. Pain assessments continuously implemented.

## 2022-11-25 ENCOUNTER — APPOINTMENT (OUTPATIENT)
Dept: RADIOLOGY | Facility: MEDICAL CENTER | Age: 37
End: 2022-11-25
Attending: OBSTETRICS & GYNECOLOGY
Payer: COMMERCIAL

## 2022-11-25 PROBLEM — O48.0 POST-DATES PREGNANCY: Status: ACTIVE | Noted: 2022-11-25

## 2022-11-25 LAB
ERYTHROCYTE [DISTWIDTH] IN BLOOD BY AUTOMATED COUNT: 47.3 FL (ref 35.9–50)
HCT VFR BLD AUTO: 28.4 % (ref 37–47)
HGB BLD-MCNC: 9.6 G/DL (ref 12–16)
MCH RBC QN AUTO: 29.6 PG (ref 27–33)
MCHC RBC AUTO-ENTMCNC: 33.8 G/DL (ref 33.6–35)
MCV RBC AUTO: 87.7 FL (ref 81.4–97.8)
PLATELET # BLD AUTO: 158 K/UL (ref 164–446)
PMV BLD AUTO: 11.7 FL (ref 9–12.9)
RBC # BLD AUTO: 3.24 M/UL (ref 4.2–5.4)
WBC # BLD AUTO: 15.5 K/UL (ref 4.8–10.8)

## 2022-11-25 PROCEDURE — 160002 HCHG RECOVERY MINUTES (STAT): Performed by: OBSTETRICS & GYNECOLOGY

## 2022-11-25 PROCEDURE — 700105 HCHG RX REV CODE 258: Performed by: STUDENT IN AN ORGANIZED HEALTH CARE EDUCATION/TRAINING PROGRAM

## 2022-11-25 PROCEDURE — C1755 CATHETER, INTRASPINAL: HCPCS | Performed by: OBSTETRICS & GYNECOLOGY

## 2022-11-25 PROCEDURE — 59514 CESAREAN DELIVERY ONLY: CPT | Mod: 80 | Performed by: OBSTETRICS & GYNECOLOGY

## 2022-11-25 PROCEDURE — A9270 NON-COVERED ITEM OR SERVICE: HCPCS | Performed by: STUDENT IN AN ORGANIZED HEALTH CARE EDUCATION/TRAINING PROGRAM

## 2022-11-25 PROCEDURE — 700111 HCHG RX REV CODE 636 W/ 250 OVERRIDE (IP): Performed by: STUDENT IN AN ORGANIZED HEALTH CARE EDUCATION/TRAINING PROGRAM

## 2022-11-25 PROCEDURE — 160048 HCHG OR STATISTICAL LEVEL 1-5: Performed by: OBSTETRICS & GYNECOLOGY

## 2022-11-25 PROCEDURE — 700101 HCHG RX REV CODE 250: Performed by: STUDENT IN AN ORGANIZED HEALTH CARE EDUCATION/TRAINING PROGRAM

## 2022-11-25 PROCEDURE — 01968 ANES/ANALG CS DLVR NEURAXIAL: CPT | Performed by: STUDENT IN AN ORGANIZED HEALTH CARE EDUCATION/TRAINING PROGRAM

## 2022-11-25 PROCEDURE — 160029 HCHG SURGERY MINUTES - 1ST 30 MINS LEVEL 4: Performed by: OBSTETRICS & GYNECOLOGY

## 2022-11-25 PROCEDURE — 700102 HCHG RX REV CODE 250 W/ 637 OVERRIDE(OP): Performed by: OBSTETRICS & GYNECOLOGY

## 2022-11-25 PROCEDURE — 160009 HCHG ANES TIME/MIN: Performed by: OBSTETRICS & GYNECOLOGY

## 2022-11-25 PROCEDURE — 85027 COMPLETE CBC AUTOMATED: CPT

## 2022-11-25 PROCEDURE — 160035 HCHG PACU - 1ST 60 MINS PHASE I: Performed by: OBSTETRICS & GYNECOLOGY

## 2022-11-25 PROCEDURE — 770002 HCHG ROOM/CARE - OB PRIVATE (112)

## 2022-11-25 PROCEDURE — 700102 HCHG RX REV CODE 250 W/ 637 OVERRIDE(OP): Performed by: STUDENT IN AN ORGANIZED HEALTH CARE EDUCATION/TRAINING PROGRAM

## 2022-11-25 PROCEDURE — 76775 US EXAM ABDO BACK WALL LIM: CPT

## 2022-11-25 PROCEDURE — 36415 COLL VENOUS BLD VENIPUNCTURE: CPT

## 2022-11-25 PROCEDURE — 700105 HCHG RX REV CODE 258: Performed by: OBSTETRICS & GYNECOLOGY

## 2022-11-25 PROCEDURE — A9270 NON-COVERED ITEM OR SERVICE: HCPCS | Performed by: OBSTETRICS & GYNECOLOGY

## 2022-11-25 PROCEDURE — 160041 HCHG SURGERY MINUTES - EA ADDL 1 MIN LEVEL 4: Performed by: OBSTETRICS & GYNECOLOGY

## 2022-11-25 PROCEDURE — 700111 HCHG RX REV CODE 636 W/ 250 OVERRIDE (IP): Performed by: OBSTETRICS & GYNECOLOGY

## 2022-11-25 RX ORDER — KETOROLAC TROMETHAMINE 30 MG/ML
30 INJECTION, SOLUTION INTRAMUSCULAR; INTRAVENOUS EVERY 6 HOURS
Status: COMPLETED | OUTPATIENT
Start: 2022-11-25 | End: 2022-11-26

## 2022-11-25 RX ORDER — METOCLOPRAMIDE HYDROCHLORIDE 5 MG/ML
10 INJECTION INTRAMUSCULAR; INTRAVENOUS EVERY 6 HOURS PRN
Status: DISCONTINUED | OUTPATIENT
Start: 2022-11-25 | End: 2022-11-27 | Stop reason: HOSPADM

## 2022-11-25 RX ORDER — SODIUM CHLORIDE, SODIUM GLUCONATE, SODIUM ACETATE, POTASSIUM CHLORIDE AND MAGNESIUM CHLORIDE 526; 502; 368; 37; 30 MG/100ML; MG/100ML; MG/100ML; MG/100ML; MG/100ML
1000 INJECTION, SOLUTION INTRAVENOUS ONCE
Status: COMPLETED | OUTPATIENT
Start: 2022-11-25 | End: 2022-11-25

## 2022-11-25 RX ORDER — DIPHENHYDRAMINE HYDROCHLORIDE 50 MG/ML
25 INJECTION INTRAMUSCULAR; INTRAVENOUS EVERY 6 HOURS PRN
Status: ACTIVE | OUTPATIENT
Start: 2022-11-25 | End: 2022-11-26

## 2022-11-25 RX ORDER — ONDANSETRON 2 MG/ML
4 INJECTION INTRAMUSCULAR; INTRAVENOUS EVERY 6 HOURS PRN
Status: DISCONTINUED | OUTPATIENT
Start: 2022-11-25 | End: 2022-11-27 | Stop reason: HOSPADM

## 2022-11-25 RX ORDER — ROPIVACAINE HYDROCHLORIDE 2 MG/ML
INJECTION, SOLUTION EPIDURAL; INFILTRATION; PERINEURAL CONTINUOUS
Status: DISCONTINUED | OUTPATIENT
Start: 2022-11-25 | End: 2022-11-25

## 2022-11-25 RX ORDER — SODIUM CHLORIDE, SODIUM LACTATE, POTASSIUM CHLORIDE, CALCIUM CHLORIDE 600; 310; 30; 20 MG/100ML; MG/100ML; MG/100ML; MG/100ML
INJECTION, SOLUTION INTRAVENOUS PRN
Status: DISCONTINUED | OUTPATIENT
Start: 2022-11-25 | End: 2022-11-27 | Stop reason: HOSPADM

## 2022-11-25 RX ORDER — IBUPROFEN 800 MG/1
800 TABLET ORAL EVERY 8 HOURS PRN
Status: DISCONTINUED | OUTPATIENT
Start: 2022-11-29 | End: 2022-11-26

## 2022-11-25 RX ORDER — IBUPROFEN 800 MG/1
800 TABLET ORAL EVERY 8 HOURS
Status: DISCONTINUED | OUTPATIENT
Start: 2022-11-26 | End: 2022-11-26

## 2022-11-25 RX ORDER — OXYCODONE HYDROCHLORIDE 10 MG/1
10 TABLET ORAL EVERY 4 HOURS PRN
Status: DISCONTINUED | OUTPATIENT
Start: 2022-11-26 | End: 2022-11-27 | Stop reason: HOSPADM

## 2022-11-25 RX ORDER — MORPHINE SULFATE 0.5 MG/ML
INJECTION, SOLUTION EPIDURAL; INTRATHECAL; INTRAVENOUS PRN
Status: DISCONTINUED | OUTPATIENT
Start: 2022-11-25 | End: 2022-11-25 | Stop reason: SURG

## 2022-11-25 RX ORDER — VITAMIN A ACETATE, BETA CAROTENE, ASCORBIC ACID, CHOLECALCIFEROL, .ALPHA.-TOCOPHEROL ACETATE, DL-, THIAMINE MONONITRATE, RIBOFLAVIN, NIACINAMIDE, PYRIDOXINE HYDROCHLORIDE, FOLIC ACID, CYANOCOBALAMIN, CALCIUM CARBONATE, FERROUS FUMARATE, ZINC OXIDE, CUPRIC OXIDE 3080; 12; 120; 400; 1; 1.84; 3; 20; 22; 920; 25; 200; 27; 10; 2 [IU]/1; UG/1; MG/1; [IU]/1; MG/1; MG/1; MG/1; MG/1; MG/1; [IU]/1; MG/1; MG/1; MG/1; MG/1; MG/1
1 TABLET, FILM COATED ORAL
Status: DISCONTINUED | OUTPATIENT
Start: 2022-11-25 | End: 2022-11-27 | Stop reason: HOSPADM

## 2022-11-25 RX ORDER — CEFAZOLIN SODIUM 1 G/3ML
INJECTION, POWDER, FOR SOLUTION INTRAMUSCULAR; INTRAVENOUS PRN
Status: DISCONTINUED | OUTPATIENT
Start: 2022-11-25 | End: 2022-11-25 | Stop reason: SURG

## 2022-11-25 RX ORDER — DIPHENHYDRAMINE HCL 25 MG
25 TABLET ORAL EVERY 6 HOURS PRN
Status: DISCONTINUED | OUTPATIENT
Start: 2022-11-26 | End: 2022-11-27 | Stop reason: HOSPADM

## 2022-11-25 RX ORDER — ACETAMINOPHEN 500 MG
1000 TABLET ORAL EVERY 6 HOURS PRN
Status: DISCONTINUED | OUTPATIENT
Start: 2022-11-29 | End: 2022-11-27 | Stop reason: HOSPADM

## 2022-11-25 RX ORDER — DIPHENHYDRAMINE HYDROCHLORIDE 50 MG/ML
12.5 INJECTION INTRAMUSCULAR; INTRAVENOUS
Status: DISCONTINUED | OUTPATIENT
Start: 2022-11-25 | End: 2022-11-25 | Stop reason: HOSPADM

## 2022-11-25 RX ORDER — PHENYLEPHRINE HYDROCHLORIDE 10 MG/ML
INJECTION, SOLUTION INTRAMUSCULAR; INTRAVENOUS; SUBCUTANEOUS PRN
Status: DISCONTINUED | OUTPATIENT
Start: 2022-11-25 | End: 2022-11-25 | Stop reason: SURG

## 2022-11-25 RX ORDER — BUPIVACAINE HYDROCHLORIDE 2.5 MG/ML
INJECTION, SOLUTION EPIDURAL; INFILTRATION; INTRACAUDAL
Status: ACTIVE
Start: 2022-11-25 | End: 2022-11-25

## 2022-11-25 RX ORDER — OXYTOCIN 10 [USP'U]/ML
INJECTION, SOLUTION INTRAMUSCULAR; INTRAVENOUS PRN
Status: DISCONTINUED | OUTPATIENT
Start: 2022-11-25 | End: 2022-11-25 | Stop reason: SURG

## 2022-11-25 RX ORDER — OXYCODONE HYDROCHLORIDE 5 MG/1
5 TABLET ORAL EVERY 4 HOURS PRN
Status: DISCONTINUED | OUTPATIENT
Start: 2022-11-26 | End: 2022-11-27 | Stop reason: HOSPADM

## 2022-11-25 RX ORDER — SIMETHICONE 125 MG
125 TABLET,CHEWABLE ORAL 4 TIMES DAILY PRN
Status: DISCONTINUED | OUTPATIENT
Start: 2022-11-25 | End: 2022-11-27 | Stop reason: HOSPADM

## 2022-11-25 RX ORDER — MEPERIDINE HYDROCHLORIDE 25 MG/ML
6.25 INJECTION INTRAMUSCULAR; INTRAVENOUS; SUBCUTANEOUS
Status: DISCONTINUED | OUTPATIENT
Start: 2022-11-25 | End: 2022-11-25 | Stop reason: HOSPADM

## 2022-11-25 RX ORDER — ONDANSETRON 2 MG/ML
4 INJECTION INTRAMUSCULAR; INTRAVENOUS
Status: DISCONTINUED | OUTPATIENT
Start: 2022-11-25 | End: 2022-11-25 | Stop reason: HOSPADM

## 2022-11-25 RX ORDER — HYDROMORPHONE HYDROCHLORIDE 1 MG/ML
0.2 INJECTION, SOLUTION INTRAMUSCULAR; INTRAVENOUS; SUBCUTANEOUS
Status: DISCONTINUED | OUTPATIENT
Start: 2022-11-25 | End: 2022-11-25 | Stop reason: HOSPADM

## 2022-11-25 RX ORDER — DIPHENHYDRAMINE HYDROCHLORIDE 50 MG/ML
12.5 INJECTION INTRAMUSCULAR; INTRAVENOUS EVERY 6 HOURS PRN
Status: ACTIVE | OUTPATIENT
Start: 2022-11-25 | End: 2022-11-26

## 2022-11-25 RX ORDER — SODIUM CHLORIDE, SODIUM LACTATE, POTASSIUM CHLORIDE, AND CALCIUM CHLORIDE .6; .31; .03; .02 G/100ML; G/100ML; G/100ML; G/100ML
1000 INJECTION, SOLUTION INTRAVENOUS
Status: DISCONTINUED | OUTPATIENT
Start: 2022-11-25 | End: 2022-11-25

## 2022-11-25 RX ORDER — OXYCODONE HCL 5 MG/5 ML
5 SOLUTION, ORAL ORAL
Status: DISCONTINUED | OUTPATIENT
Start: 2022-11-25 | End: 2022-11-25 | Stop reason: HOSPADM

## 2022-11-25 RX ORDER — HYDROMORPHONE HYDROCHLORIDE 1 MG/ML
0.1 INJECTION, SOLUTION INTRAMUSCULAR; INTRAVENOUS; SUBCUTANEOUS
Status: DISCONTINUED | OUTPATIENT
Start: 2022-11-25 | End: 2022-11-25 | Stop reason: HOSPADM

## 2022-11-25 RX ORDER — ONDANSETRON 4 MG/1
4 TABLET, ORALLY DISINTEGRATING ORAL EVERY 6 HOURS PRN
Status: DISCONTINUED | OUTPATIENT
Start: 2022-11-25 | End: 2022-11-27 | Stop reason: HOSPADM

## 2022-11-25 RX ORDER — DOCUSATE SODIUM 100 MG/1
100 CAPSULE, LIQUID FILLED ORAL 2 TIMES DAILY PRN
Status: DISCONTINUED | OUTPATIENT
Start: 2022-11-25 | End: 2022-11-27 | Stop reason: HOSPADM

## 2022-11-25 RX ORDER — CALCIUM CARBONATE 500 MG/1
1000 TABLET, CHEWABLE ORAL EVERY 6 HOURS PRN
Status: DISCONTINUED | OUTPATIENT
Start: 2022-11-25 | End: 2022-11-27 | Stop reason: HOSPADM

## 2022-11-25 RX ORDER — SODIUM CHLORIDE, SODIUM LACTATE, POTASSIUM CHLORIDE, AND CALCIUM CHLORIDE .6; .31; .03; .02 G/100ML; G/100ML; G/100ML; G/100ML
250 INJECTION, SOLUTION INTRAVENOUS PRN
Status: DISCONTINUED | OUTPATIENT
Start: 2022-11-25 | End: 2022-11-25

## 2022-11-25 RX ORDER — METOCLOPRAMIDE HYDROCHLORIDE 5 MG/ML
10 INJECTION INTRAMUSCULAR; INTRAVENOUS ONCE
Status: COMPLETED | OUTPATIENT
Start: 2022-11-25 | End: 2022-11-25

## 2022-11-25 RX ORDER — OXYCODONE HYDROCHLORIDE 5 MG/1
5 TABLET ORAL EVERY 4 HOURS PRN
Status: ACTIVE | OUTPATIENT
Start: 2022-11-25 | End: 2022-11-26

## 2022-11-25 RX ORDER — KETOROLAC TROMETHAMINE 30 MG/ML
30 INJECTION, SOLUTION INTRAMUSCULAR; INTRAVENOUS EVERY 6 HOURS
Status: DISCONTINUED | OUTPATIENT
Start: 2022-11-25 | End: 2022-11-25

## 2022-11-25 RX ORDER — CITRIC ACID/SODIUM CITRATE 334-500MG
30 SOLUTION, ORAL ORAL ONCE
Status: COMPLETED | OUTPATIENT
Start: 2022-11-25 | End: 2022-11-25

## 2022-11-25 RX ORDER — ACETAMINOPHEN 500 MG
1000 TABLET ORAL EVERY 6 HOURS
Status: DISCONTINUED | OUTPATIENT
Start: 2022-11-26 | End: 2022-11-27 | Stop reason: HOSPADM

## 2022-11-25 RX ORDER — AZITHROMYCIN 500 MG/5ML
500 INJECTION, POWDER, LYOPHILIZED, FOR SOLUTION INTRAVENOUS ONCE
Status: COMPLETED | OUTPATIENT
Start: 2022-11-25 | End: 2022-11-25

## 2022-11-25 RX ORDER — OXYCODONE HCL 5 MG/5 ML
10 SOLUTION, ORAL ORAL
Status: DISCONTINUED | OUTPATIENT
Start: 2022-11-25 | End: 2022-11-25 | Stop reason: HOSPADM

## 2022-11-25 RX ORDER — KETOROLAC TROMETHAMINE 30 MG/ML
INJECTION, SOLUTION INTRAMUSCULAR; INTRAVENOUS PRN
Status: DISCONTINUED | OUTPATIENT
Start: 2022-11-25 | End: 2022-11-25 | Stop reason: SURG

## 2022-11-25 RX ORDER — ACETAMINOPHEN 500 MG
1000 TABLET ORAL EVERY 6 HOURS
Status: COMPLETED | OUTPATIENT
Start: 2022-11-25 | End: 2022-11-26

## 2022-11-25 RX ORDER — DEXAMETHASONE SODIUM PHOSPHATE 4 MG/ML
INJECTION, SOLUTION INTRA-ARTICULAR; INTRALESIONAL; INTRAMUSCULAR; INTRAVENOUS; SOFT TISSUE PRN
Status: DISCONTINUED | OUTPATIENT
Start: 2022-11-25 | End: 2022-11-25 | Stop reason: HOSPADM

## 2022-11-25 RX ORDER — HALOPERIDOL 5 MG/ML
1 INJECTION INTRAMUSCULAR
Status: DISCONTINUED | OUTPATIENT
Start: 2022-11-25 | End: 2022-11-25 | Stop reason: HOSPADM

## 2022-11-25 RX ORDER — ONDANSETRON 2 MG/ML
INJECTION INTRAMUSCULAR; INTRAVENOUS PRN
Status: DISCONTINUED | OUTPATIENT
Start: 2022-11-25 | End: 2022-11-25 | Stop reason: SURG

## 2022-11-25 RX ORDER — DIPHENHYDRAMINE HYDROCHLORIDE 50 MG/ML
25 INJECTION INTRAMUSCULAR; INTRAVENOUS EVERY 6 HOURS PRN
Status: DISCONTINUED | OUTPATIENT
Start: 2022-11-26 | End: 2022-11-27 | Stop reason: HOSPADM

## 2022-11-25 RX ORDER — HYDROMORPHONE HYDROCHLORIDE 1 MG/ML
0.4 INJECTION, SOLUTION INTRAMUSCULAR; INTRAVENOUS; SUBCUTANEOUS
Status: DISCONTINUED | OUTPATIENT
Start: 2022-11-25 | End: 2022-11-25 | Stop reason: HOSPADM

## 2022-11-25 RX ORDER — SODIUM CHLORIDE, SODIUM GLUCONATE, SODIUM ACETATE, POTASSIUM CHLORIDE AND MAGNESIUM CHLORIDE 526; 502; 368; 37; 30 MG/100ML; MG/100ML; MG/100ML; MG/100ML; MG/100ML
INJECTION, SOLUTION INTRAVENOUS
Status: DISCONTINUED | OUTPATIENT
Start: 2022-11-25 | End: 2022-11-25 | Stop reason: SURG

## 2022-11-25 RX ORDER — MENTHOL AND METHYL SALICYLATE 7.6; 29 G/100G; G/100G
OINTMENT TOPICAL PRN
Status: DISCONTINUED | OUTPATIENT
Start: 2022-11-25 | End: 2022-11-27 | Stop reason: HOSPADM

## 2022-11-25 RX ORDER — LIDOCAINE HYDROCHLORIDE 20 MG/ML
INJECTION, SOLUTION EPIDURAL; INFILTRATION; INTRACAUDAL; PERINEURAL PRN
Status: DISCONTINUED | OUTPATIENT
Start: 2022-11-25 | End: 2022-11-25 | Stop reason: SURG

## 2022-11-25 RX ADMIN — ONDANSETRON 4 MG: 2 INJECTION INTRAMUSCULAR; INTRAVENOUS at 04:19

## 2022-11-25 RX ADMIN — LIDOCAINE HYDROCHLORIDE 3 ML: 20 INJECTION, SOLUTION EPIDURAL; INFILTRATION; INTRACAUDAL at 04:22

## 2022-11-25 RX ADMIN — METOCLOPRAMIDE 10 MG: 5 INJECTION, SOLUTION INTRAMUSCULAR; INTRAVENOUS at 03:54

## 2022-11-25 RX ADMIN — KETOROLAC TROMETHAMINE 30 MG: 30 INJECTION, SOLUTION INTRAMUSCULAR at 17:04

## 2022-11-25 RX ADMIN — OXYTOCIN 2 MILLI-UNITS/MIN: 10 INJECTION, SOLUTION INTRAMUSCULAR; INTRAVENOUS at 01:40

## 2022-11-25 RX ADMIN — FENTANYL CITRATE 100 MCG: 50 INJECTION, SOLUTION INTRAMUSCULAR; INTRAVENOUS at 02:41

## 2022-11-25 RX ADMIN — CEFAZOLIN 2 G: 330 INJECTION, POWDER, FOR SOLUTION INTRAMUSCULAR; INTRAVENOUS at 04:19

## 2022-11-25 RX ADMIN — SODIUM CHLORIDE, POTASSIUM CHLORIDE, SODIUM LACTATE AND CALCIUM CHLORIDE 1000 ML: 600; 310; 30; 20 INJECTION, SOLUTION INTRAVENOUS at 00:39

## 2022-11-25 RX ADMIN — DEXAMETHASONE SODIUM PHOSPHATE 4 MG: 4 INJECTION, SOLUTION INTRA-ARTICULAR; INTRALESIONAL; INTRAMUSCULAR; INTRAVENOUS; SOFT TISSUE at 04:32

## 2022-11-25 RX ADMIN — LIDOCAINE HYDROCHLORIDE 2 ML: 20 INJECTION, SOLUTION EPIDURAL; INFILTRATION; INTRACAUDAL at 04:35

## 2022-11-25 RX ADMIN — MORPHINE SULFATE 1 MG: 0.5 INJECTION, SOLUTION EPIDURAL; INTRATHECAL; INTRAVENOUS at 04:45

## 2022-11-25 RX ADMIN — OXYTOCIN 125 ML/HR: 10 INJECTION, SOLUTION INTRAMUSCULAR; INTRAVENOUS at 07:33

## 2022-11-25 RX ADMIN — KETOROLAC TROMETHAMINE 30 MG: 30 INJECTION, SOLUTION INTRAMUSCULAR at 23:06

## 2022-11-25 RX ADMIN — OXYTOCIN 20 UNITS: 10 INJECTION, SOLUTION INTRAMUSCULAR; INTRAVENOUS at 04:37

## 2022-11-25 RX ADMIN — PRENATAL WITH FERROUS FUM AND FOLIC ACID 1 TABLET: 3080; 920; 120; 400; 22; 1.84; 3; 20; 10; 1; 12; 200; 27; 25; 2 TABLET ORAL at 10:44

## 2022-11-25 RX ADMIN — SODIUM CHLORIDE, SODIUM GLUCONATE, SODIUM ACETATE, POTASSIUM CHLORIDE AND MAGNESIUM CHLORIDE 1000 ML: 526; 502; 368; 37; 30 INJECTION, SOLUTION INTRAVENOUS at 03:54

## 2022-11-25 RX ADMIN — SODIUM CHLORIDE, SODIUM GLUCONATE, SODIUM ACETATE, POTASSIUM CHLORIDE AND MAGNESIUM CHLORIDE: 526; 502; 368; 37; 30 INJECTION, SOLUTION INTRAVENOUS at 04:13

## 2022-11-25 RX ADMIN — LIDOCAINE HYDROCHLORIDE 5 ML: 20 INJECTION, SOLUTION EPIDURAL; INFILTRATION; INTRACAUDAL at 04:10

## 2022-11-25 RX ADMIN — PHENYLEPHRINE HYDROCHLORIDE 200 MCG: 10 INJECTION INTRAVENOUS at 04:32

## 2022-11-25 RX ADMIN — ACETAMINOPHEN 1000 MG: 500 TABLET ORAL at 17:04

## 2022-11-25 RX ADMIN — SODIUM CITRATE AND CITRIC ACID MONOHYDRATE 30 ML: 500; 334 SOLUTION ORAL at 03:54

## 2022-11-25 RX ADMIN — FAMOTIDINE 20 MG: 10 INJECTION, SOLUTION INTRAVENOUS at 03:54

## 2022-11-25 RX ADMIN — AZITHROMYCIN FOR INJECTION INJECTION, POWDER, LYOPHILIZED, FOR SOLUTION 500 MG: 500 INJECTION INTRAVENOUS at 04:01

## 2022-11-25 RX ADMIN — KETOROLAC TROMETHAMINE 15 MG: 30 INJECTION, SOLUTION INTRAMUSCULAR at 04:48

## 2022-11-25 RX ADMIN — ALUMINUM HYDROXIDE, MAGNESIUM HYDROXIDE, AND DIMETHICONE 30 ML: 400; 400; 40 SUSPENSION ORAL at 01:20

## 2022-11-25 RX ADMIN — KETOROLAC TROMETHAMINE 30 MG: 30 INJECTION, SOLUTION INTRAMUSCULAR at 10:43

## 2022-11-25 RX ADMIN — ACETAMINOPHEN 1000 MG: 500 TABLET ORAL at 23:05

## 2022-11-25 RX ADMIN — LIDOCAINE HYDROCHLORIDE 5 ML: 20 INJECTION, SOLUTION EPIDURAL; INFILTRATION; INTRACAUDAL at 03:55

## 2022-11-25 RX ADMIN — LIDOCAINE HYDROCHLORIDE 1 ML: 20 INJECTION, SOLUTION EPIDURAL; INFILTRATION; INTRACAUDAL at 04:44

## 2022-11-25 RX ADMIN — ACETAMINOPHEN 1000 MG: 500 TABLET ORAL at 10:42

## 2022-11-25 ASSESSMENT — PAIN DESCRIPTION - PAIN TYPE
TYPE: ACUTE PAIN;SURGICAL PAIN
TYPE: ACUTE PAIN;SURGICAL PAIN
TYPE: SURGICAL PAIN
TYPE: ACUTE PAIN;SURGICAL PAIN
TYPE: SURGICAL PAIN
TYPE: ACUTE PAIN;SURGICAL PAIN

## 2022-11-25 NOTE — OR SURGEON
Immediate Post OP Note    PreOp Diagnosis:     41 1/7 wk gestation  Induced labor  Secondary arrest of dilatation and descent      PostOp Diagnosis:     Same, plus nuchal cord      Procedure(s):   SECTION, PRIMARY    Surgeon(s):  Yovany Parry M.D.    Anesthesiologist/Type of Anesthesia:  Anesthesiologist: (Unknown)/Spinal    Surgical Staff:  Circulator: (Unknown)  Scrub Person: (Unknown)    Specimens removed if any:  * No specimens in log *    Estimated Blood Loss: 300 cc    Findings: baby-male, APGARs 8-9, 3870 grams, single loose NC    Complications: none        2022 5:15 AM Yovany Parry M.D.

## 2022-11-25 NOTE — ANESTHESIA POSTPROCEDURE EVALUATION
Patient: Brooke Young    Procedure Summary     Date: 22 Room / Location: [facility] OR 02 / SURGERY LABOR AND DELIVERY    Anesthesia Start:  Anesthesia Stop: 22    Procedure:  SECTION, PRIMARY (Abdomen) Diagnosis: (Maternal Intolerance)    Surgeons: Yovany Parry M.D. Responsible Provider: Juan Griffiths M.D.    Anesthesia Type: epidural ASA Status: 2          Final Anesthesia Type: epidural  Last vitals  BP   Blood Pressure: 126/65    Temp   36.9 °C (98.5 °F)    Pulse   (!) 105   Resp   18    SpO2   94 %      Anesthesia Post Evaluation    Patient location during evaluation: PACU  Patient participation: complete - patient participated  Level of consciousness: awake and alert    Airway patency: patent  Anesthetic complications: no  Cardiovascular status: hemodynamically stable  Respiratory status: acceptable  Hydration status: euvolemic    PONV: none    patient able to participate, but full recovery from regional anesthesia has not occurred and is not expected within the stipulated timeframe for the completion of the evaluation      No notable events documented.     Nurse Pain Score: 9 (NPRS)

## 2022-11-25 NOTE — PROGRESS NOTES
FHR Cat I    Renal U/S shows no reason for acute severe left flank pain---left kidney normal, no left hydronephrosis, no stones seen, mild-moderate right hydronephrosis.    Pain was only partially and transiently relieved by fentanyl 100 mcg.    Discussed lack of progress, recc. Csection, R/B/A discussed, pt. gave her permission for Csection.

## 2022-11-25 NOTE — PROGRESS NOTES
0820- report received at bedside from L&D RN. POC discussed including feeding intervals, I+O documentation, latch support, lochia changes, ambulation, infant temperature management including STS and layering, weights, VS intervals, and discharge planning. Medications and other comfort measures discussed. Call light in reach.

## 2022-11-25 NOTE — OP REPORT
DATE OF SERVICE:  2022     OPERATION:  Primary low transverse  section.     SURGEON:  Yovany Parry MD     ASSISTANT:  Kevin Kruger MD.     ANESTHESIOLOGIST:  Juan Griffiths MD     ANESTHESIA:  Epidural.     PREOPERATIVE DIAGNOSES:     1.  A 41 and 1/7 week gestation.  2.  Induced labor.  3.  Secondary arrest of dilatation and descent.     POSTOPERATIVE DIAGNOSES:    1.  A 41 and 1/7 week gestation.  2.  Induced labor.  3.  Secondary arrest of dilatation and descent.   4.  Loose nuchal cord.     COMPLICATIONS:  None.     ESTIMATED BLOOD LOSS:  300 mL.     FINDINGS:  Baby ---male, 1 minute Apgar 8, 5 minute Apgar 9, weight 3870 grams.     INDICATIONS:  This 37-year-old lady is now  1, para 1.  She came   yesterday morning for postdates induction at 41 weeks' gestation.  She is   group B strep negative.  Her labor was successfully induced with 2 doses of   intravaginal misoprostol. Spontaneous rupture of membranes revealed clear   amniotic fluid.  She received low dose oxytocin augmentation.  She had   epidural anesthesia. Towards the end, she was suffering from agonizing left   flank pain in spite of a well-functioning epidural.  Stat renal ultrasound   showed no evidence of a left hydronephrosis or stone.  She did have mild to   moderate right hydronephrosis.  She arrested at 4 cm dilatation, -1 station   for 6 hours in spite of regular strong contractions, so I recommended and she   accepted a  section.  Fetal monitoring was reassuring throughout.     DESCRIPTION OF PROCEDURE:  The patient went to the OR.  She received   azithromycin 500 mg and Ancef 2 grams IV preoperatively.  She had a Betadine   vaginal prep preoperatively.  Her abdomen was prepped and draped.  A timeout   was done.  I made a Pfannenstiel skin incision.  I incised a thin layer of   subcutaneous fat.  I incised the rectus fascia transversely.  I  the   fascia from the rectus muscles.  I entered the  peritoneum well away from the   bladder.  The peritoneal incision was enlarged.  An Samuel O retractor was   placed.  I incised the vesicouterine peritoneum transversely.  I made sure the   bladder was clear of the planned hysterotomy site.  I made a low transverse   myometrial incision with a scalpel.  I pierced the membranes with a hemostat   revealing clear fluid.  The baby's head was extracted from right occiput   transverse position with no difficulty.  I bulb suctioned the baby's mouth.    One loose nuchal cord was reduced.  The shoulders and body delivered easily.    Thirty seconds later, I doubly clamped and cut the cord and the baby was   handed off.  The placenta and all trailing membranes delivered spontaneously.    I sponge curetted the endometrial cavity to make sure there were no retained   membrane fragments.  I closed the myometrium with running interlocking 0   Vicryl.  I placed a second layer of 0 Vicryl, imbricating the first layer and   reapproximated the anterior cul-de-sac peritoneum.  Both fallopian tubes and   ovaries were inspected.  There were no adnexal masses evident.  The Samuel O   was removed.  I closed the anterior parietal peritoneum and the posterior   layer of the rectus sheath with running 2-0 Vicryl, I closed both layers of   the rectus abdominis fascia with running 0 PDS.  I closed Ari's fascia with   running 3-0 Vicryl, I closed the skin with Insorb resorbable subcutaneous   staples, 1/2 inch wide Steri-Strips and a Mepilex Ag dressing were placed.    Sponge and needle counts were correct.        ______________________________  MD JORGE Mayfield/MIRYAM    DD:  11/25/2022 05:23  DT:  11/25/2022 05:42    Job#:  [id number]

## 2022-11-25 NOTE — ANESTHESIA PROCEDURE NOTES
Epidural Block    Date/Time: 11/24/2022 8:56 PM  Performed by: Juan Griffiths M.D.  Authorized by: Juan Griffiths M.D.     Patient Location:  OB  Start Time:  11/24/2022 8:56 PM  End Time:  11/24/2022 9:02 PM  Reason for Block: labor analgesia    patient identified, IV checked, site marked, risks and benefits discussed, surgical consent, monitors and equipment checked, pre-op evaluation and timeout performed    Patient Position:  Sitting  Prep: ChloraPrep, patient draped and sterile technique    Monitoring:  Blood pressure, continuous pulse oximetry and heart rate  Approach:  Midline  Location:  L3-L4  Injection Technique:  BOB saline  Skin infiltration:  Lidocaine  Strength:  1%  Dose:  3ml  Needle Type:  Tuohy  Needle Gauge:  17 G  Needle Length:  3.5 in  Loss of resistance::  5  Catheter Size:  19 G  Catheter at Skin Depth:  13  Test Dose Result:  Negative

## 2022-11-25 NOTE — PROGRESS NOTES
0700: Report received from LANI Bates. Assumed care of pt. Firm fundus and light lochia. Pt currently holding infant.    0815: Pt and infant taken up to postpartum in stable condition with FOB by side. Firm fundus and light lochia. Infant bands checked x2. Report given to LANI Perez.

## 2022-11-25 NOTE — LACTATION NOTE
"This note was copied from a baby's chart.  Physical assessment of baby and mother provided. Introduction to basics of initiating breastfeeding shown at this time to include posture, angle of latch, hand expression, skin to skin and normal  feeding patterns and expectations.     Mother is very concerned baby may not receive any \"milk\" as she has a history of breast reduction and cystic breasts.     She discussed that she may want to offer baby a supplement  of formula and I cautioned that this should be very small amounts if she chose to do that tonight.     She brought her personal breast pump with her and may want to get that out tonight and try it as well.     I reassured her we are here to assist her in supporting her lactation plan.    We were able to achieve a couple of latches with baby All but he was gassy and fussy and did not maintain latches for long.  "

## 2022-11-25 NOTE — PROGRESS NOTES
FHR Cat I  Mild ctx Q 2-3 minutes on monitor, not palpable.  Cx soft, 2/80%/-2    I placed misoprostol #2, 25 mcg posterior fornix.

## 2022-11-25 NOTE — PROGRESS NOTES
FHR Cat I  S/p SROM at 17:17, clear AF  Ctx Q 3-4 min, mild/mod to palpation  Cx 4/100%/-1    Rx pitocin augmentation

## 2022-11-25 NOTE — PROGRESS NOTES
FHR Cat I-II  No progress in 5 hrs, still 4/100%/-1  O2 sat on room air 96%, denies dyspnea or cough.     New problem: agonizing localized left flank pain unrelieved by Icy Hot, in spite of well-functioning labor epidural.    PLAN:    Pitocin DCd for the time-being.  Fentanyl ,100 mcg given IV.  STAT renal ultrasound to r/o ureteral obstruction.

## 2022-11-25 NOTE — ANESTHESIA PREPROCEDURE EVALUATION
Date: 11/24/22  Procedure: Labor Epidural     36 yo F G1 @41wk. Plt 162. Not on AC.    Relevant Problems   No relevant active problems       Physical Exam    Airway   Mallampati: II  TM distance: >3 FB  Neck ROM: full       Cardiovascular - normal exam  Rhythm: regular  Rate: normal  (-) murmur     Dental - normal exam           Pulmonary - normal exam  Breath sounds clear to auscultation     Abdominal     Comments: gravid   Neurological - normal exam                 Anesthesia Plan    ASA 2       Plan - epidural   Neuraxial block will be labor analgesia                  Pertinent diagnostic labs and testing reviewed    Informed Consent:    Anesthetic plan and risks discussed with patient.

## 2022-11-25 NOTE — PROGRESS NOTES
1900 - Report received from Rosa Isela GARIBAY. POC discussed, assumed care of patient at this time.     0700 - Report given to Mary GARIBAY. KEENAN discussed, relinquished care of patient at this time.

## 2022-11-26 PROCEDURE — 700102 HCHG RX REV CODE 250 W/ 637 OVERRIDE(OP): Performed by: OBSTETRICS & GYNECOLOGY

## 2022-11-26 PROCEDURE — A9270 NON-COVERED ITEM OR SERVICE: HCPCS | Performed by: OBSTETRICS & GYNECOLOGY

## 2022-11-26 PROCEDURE — 770002 HCHG ROOM/CARE - OB PRIVATE (112)

## 2022-11-26 PROCEDURE — 700102 HCHG RX REV CODE 250 W/ 637 OVERRIDE(OP): Performed by: STUDENT IN AN ORGANIZED HEALTH CARE EDUCATION/TRAINING PROGRAM

## 2022-11-26 PROCEDURE — A9270 NON-COVERED ITEM OR SERVICE: HCPCS | Performed by: STUDENT IN AN ORGANIZED HEALTH CARE EDUCATION/TRAINING PROGRAM

## 2022-11-26 RX ORDER — FERROUS SULFATE 325(65) MG
325 TABLET ORAL
Status: DISCONTINUED | OUTPATIENT
Start: 2022-11-26 | End: 2022-11-27 | Stop reason: HOSPADM

## 2022-11-26 RX ORDER — IBUPROFEN 800 MG/1
800 TABLET ORAL EVERY 8 HOURS PRN
Status: DISCONTINUED | OUTPATIENT
Start: 2022-11-29 | End: 2022-11-27 | Stop reason: HOSPADM

## 2022-11-26 RX ORDER — IBUPROFEN 800 MG/1
800 TABLET ORAL EVERY 8 HOURS
Status: DISCONTINUED | OUTPATIENT
Start: 2022-11-26 | End: 2022-11-27 | Stop reason: HOSPADM

## 2022-11-26 RX ADMIN — CALCIUM CARBONATE 1000 MG: 500 TABLET, CHEWABLE ORAL at 13:11

## 2022-11-26 RX ADMIN — FERROUS SULFATE TAB 325 MG (65 MG ELEMENTAL FE) 325 MG: 325 (65 FE) TAB at 09:03

## 2022-11-26 RX ADMIN — CALCIUM CARBONATE 1000 MG: 500 TABLET, CHEWABLE ORAL at 18:41

## 2022-11-26 RX ADMIN — IBUPROFEN 800 MG: 800 TABLET, FILM COATED ORAL at 18:33

## 2022-11-26 RX ADMIN — PRENATAL WITH FERROUS FUM AND FOLIC ACID 1 TABLET: 3080; 920; 120; 400; 22; 1.84; 3; 20; 10; 1; 12; 200; 27; 25; 2 TABLET ORAL at 09:03

## 2022-11-26 RX ADMIN — DOCUSATE SODIUM 100 MG: 100 CAPSULE, LIQUID FILLED ORAL at 09:03

## 2022-11-26 RX ADMIN — IBUPROFEN 800 MG: 800 TABLET, FILM COATED ORAL at 09:02

## 2022-11-26 RX ADMIN — ACETAMINOPHEN 1000 MG: 500 TABLET ORAL at 05:03

## 2022-11-26 RX ADMIN — ACETAMINOPHEN 1000 MG: 500 TABLET ORAL at 11:41

## 2022-11-26 RX ADMIN — ACETAMINOPHEN 1000 MG: 500 TABLET ORAL at 18:33

## 2022-11-26 RX ADMIN — OXYCODONE HYDROCHLORIDE 10 MG: 10 TABLET ORAL at 13:11

## 2022-11-26 ASSESSMENT — PAIN DESCRIPTION - PAIN TYPE: TYPE: SURGICAL PAIN

## 2022-11-26 NOTE — CARE PLAN
The patient is Stable - Low risk of patient condition declining or worsening    Shift Goals  Clinical Goals: fundus and lochia WNL, intact incision, and pain control  Patient Goals: pain control, bonding with infant, feeding infant  Family Goals: bond and support    Progress made toward(s) clinical / shift goals:  Pt's fundus firm and lochia light. Pt's pain controlled via scheduled pain medications per MAR and non-pharmacological methods. Pt bonding with infant and attempting breastfeeding infant q3 hours. Was able to attain a latch with RN assistance. Support person at bedside offering support and bonding.    Patient is not progressing towards the following goals: NA

## 2022-11-26 NOTE — PROGRESS NOTES
"Assessment complete. Fundus firm, lochia light. VSS. Tolerating diet. Voiding without difficulty. Pt removed dressing due to discomfort and states that the incision was \"burning\". RN redressed incision with dry gauze and tegaderm. RN provided pt with ice pack to place over incision. Pt states that the incision pain has decreased. Pt states passing gas. Pain controlled with scheduled medications per MAR. Will offer pain medications as they become available. FOB at bedside, bonding with pt/baby. POC discussed with pt. All questions answered at this time. Encouraged to call with needs. Call light in place.     "

## 2022-11-26 NOTE — PROGRESS NOTES
0900 Assessment completed. Lochia light, fundus firm. Plan of care reviewed. Declines pain intervention at this time, will call if pain intervention needed.

## 2022-11-26 NOTE — LACTATION NOTE
This note was copied from a baby's chart.  Brooke is expressing a dark green watery fluid-this is consistent with her rare cystic breast disorder and she is uncomfortable offering to infant.     Based on this and discussion with parents, they have opted to feed baby All some formula and she will continue to use hospital grade breast pump in an effort to produce colostrum and milk. She understands that she may produce little to no true breast milk based on health history of breast disorder and breast reduction surgery for same.    Parents have viewed the slow paced bottle feeding video and I reviewed supplemental volume guidelines with them. Continued skin to skin time with baby is encouraged.

## 2022-11-26 NOTE — CARE PLAN
The patient is Watcher - Medium risk of patient condition declining or worsening    Shift Goals  Clinical Goals: stable lochia, monitor incision  Patient Goals: pain control, bonding with baby  Family Goals: bonding with baby    Progress made toward(s) clinical / shift goals:  Pain increases to a nearly intolerable level with activity, but is improving. Patient has been OOB, ambulated to bathroom and has ambulated around the room. Patient demonstrated understanding of POC and asks questions as needed.    Patient is not progressing towards the following goals: NA    Problem: Early Mobilization - Post Surgery  Goal: Early mobilization post surgery  Outcome: Progressing     Problem: Knowledge Deficit - Standard  Goal: Patient and family/care givers will demonstrate understanding of plan of care, disease process/condition, diagnostic tests and medications  Outcome: Met

## 2022-11-26 NOTE — PROGRESS NOTES
POD 1---      UO adequate  + flatus, tolerating clear liquids  Well, denies N/V  Mepilex was saturated with blood, removed and replaced with gauze dressing and tegaderm.    LAB:       10/03/22 14:20 22 10:30 22 17:30   WBC 7.7 8.5 15.5    Hemoglobin 11.1  10.8  9.6    Hematocrit 33.4  32.7 28.4    Platelet Count 202 162  158      PE:     Afebrile  BP normal    Lungs clear to auscultation  Abdomen soft, flat, bowel sounds present and normal  Wound dressing in place, waterproof barrier intact  Calves nontender, Lauren sign negative bilaterally  Back:  No CVA tenderness     PLAN: FeSO4 325 mg/day x 3 weeks, PNV daily. Postop care, advance diet as tolerated, increase activity as tolerated.

## 2022-11-27 ENCOUNTER — PHARMACY VISIT (OUTPATIENT)
Dept: PHARMACY | Facility: MEDICAL CENTER | Age: 37
End: 2022-11-27
Payer: MEDICARE

## 2022-11-27 VITALS
BODY MASS INDEX: 32.32 KG/M2 | HEART RATE: 100 BPM | TEMPERATURE: 97.9 F | WEIGHT: 194 LBS | RESPIRATION RATE: 18 BRPM | DIASTOLIC BLOOD PRESSURE: 85 MMHG | SYSTOLIC BLOOD PRESSURE: 131 MMHG | OXYGEN SATURATION: 95 % | HEIGHT: 65 IN

## 2022-11-27 PROCEDURE — A9270 NON-COVERED ITEM OR SERVICE: HCPCS | Performed by: OBSTETRICS & GYNECOLOGY

## 2022-11-27 PROCEDURE — RXMED WILLOW AMBULATORY MEDICATION CHARGE: Performed by: OBSTETRICS & GYNECOLOGY

## 2022-11-27 PROCEDURE — 700102 HCHG RX REV CODE 250 W/ 637 OVERRIDE(OP): Performed by: OBSTETRICS & GYNECOLOGY

## 2022-11-27 RX ORDER — OXYCODONE HYDROCHLORIDE 5 MG/1
5 TABLET ORAL EVERY 4 HOURS PRN
Qty: 30 TABLET | Refills: 0 | Status: SHIPPED | OUTPATIENT
Start: 2022-11-27 | End: 2022-12-04

## 2022-11-27 RX ADMIN — IBUPROFEN 800 MG: 800 TABLET, FILM COATED ORAL at 12:15

## 2022-11-27 RX ADMIN — ACETAMINOPHEN 1000 MG: 500 TABLET ORAL at 12:14

## 2022-11-27 RX ADMIN — ACETAMINOPHEN 1000 MG: 500 TABLET ORAL at 00:53

## 2022-11-27 RX ADMIN — CALCIUM CARBONATE 1000 MG: 500 TABLET, CHEWABLE ORAL at 00:53

## 2022-11-27 RX ADMIN — FERROUS SULFATE TAB 325 MG (65 MG ELEMENTAL FE) 325 MG: 325 (65 FE) TAB at 09:28

## 2022-11-27 RX ADMIN — ACETAMINOPHEN 1000 MG: 500 TABLET ORAL at 06:16

## 2022-11-27 RX ADMIN — PRENATAL WITH FERROUS FUM AND FOLIC ACID 1 TABLET: 3080; 920; 120; 400; 22; 1.84; 3; 20; 10; 1; 12; 200; 27; 25; 2 TABLET ORAL at 09:28

## 2022-11-27 RX ADMIN — IBUPROFEN 800 MG: 800 TABLET, FILM COATED ORAL at 03:05

## 2022-11-27 ASSESSMENT — EDINBURGH POSTNATAL DEPRESSION SCALE (EPDS)
I HAVE BEEN SO UNHAPPY THAT I HAVE BEEN CRYING: ONLY OCCASIONALLY
I HAVE BEEN ANXIOUS OR WORRIED FOR NO GOOD REASON: HARDLY EVER
THE THOUGHT OF HARMING MYSELF HAS OCCURRED TO ME: NEVER
I HAVE FELT SAD OR MISERABLE: NOT VERY OFTEN
I HAVE LOOKED FORWARD WITH ENJOYMENT TO THINGS: AS MUCH AS I EVER DID
I HAVE BLAMED MYSELF UNNECESSARILY WHEN THINGS WENT WRONG: NOT VERY OFTEN
I HAVE BEEN ABLE TO LAUGH AND SEE THE FUNNY SIDE OF THINGS: AS MUCH AS I ALWAYS COULD
I HAVE BEEN SO UNHAPPY THAT I HAVE HAD DIFFICULTY SLEEPING: YES, SOMETIMES
I HAVE FELT SCARED OR PANICKY FOR NO GOOD REASON: NO, NOT MUCH
THINGS HAVE BEEN GETTING ON TOP OF ME: NO, MOST OF THE TIME I HAVE COPED QUITE WELL

## 2022-11-27 NOTE — CARE PLAN
The patient is Stable - Low risk of patient condition declining or worsening    Shift Goals  Clinical Goals: Vital signs WNL, fundus firm, lochia light  Patient Goals: pain control, bonding with infant, feeding infant  Family Goals: bond and support    Progress made toward(s) clinical / shift goals:    Problem: Psychosocial - Postpartum  Goal: Patient will verbalize and demonstrate effective bonding and parenting behavior  Outcome: Progressing  Note: Patient verbalizes and demonstrates effective bonding and parenting behavior.      Problem: Altered Physiologic Condition  Goal: Patient physiologically stable as evidenced by normal lochia, palpable uterine involution and vitals within normal limits  Outcome: Progressing  Note: Patient is physiologically stable as evidenced by normal lochia, firm fundus, and vitals WNL.        Patient is not progressing towards the following goals:

## 2022-11-27 NOTE — PROGRESS NOTES
POD #2    S:  Pt doing well.  Pain controlled.  Mabel reg diet.  Minimal lochia. Passing flatus. Working on breast feeding.  Voiding well. Ready to go home    O: T 97.9 P 100  /85  ABD: Soft, ND, minimally tender, BS + normoactive, FF below umb, dressing with tegaderm and guaze - no active bleeding noted  EXT: Trace pedal edema,  no cords or Homans  H/H 9/28  A+    A/P  POD #2 S/P primary C/S for arrest of dilation and descent - doing well  1   D/C home  2. F/U Dr. Parry 2 weeks  3.  Pelvic rest for 6 weeks  4.  No lifting > 10 pounds  for 6 weeks  5.  Rx for Roxicodone.  OTC Motrin and iron

## 2022-11-27 NOTE — LACTATION NOTE
This note was copied from a baby's chart.  Parents intend to continue expressing milk and using breast pump at home. We briefly reviewed latch and position this morning.     Referral to [facility] for this week was submitted as mother has concerns over her breast discharge. She is continuing to use the breast pump every 3 hours and hand express. She is expressing a dark green watery liquid.    She feels that her breasts are malone and they do seem to appear to be filling, however no easily identifiable 'milk' has been seen yet.    She will take her pump kit home and use her personal pump [facility]. Supplemental feeding guidelines for bottle and formula were reviewed again. Parents demonstrating paced feeding technique.

## 2022-11-27 NOTE — PROGRESS NOTES
0915 Assessment completed. Lochia light, fundus firm. Plan of care reviewed. Declines pain intervention at this time, will call if pain intervention needed.    1315 Discharge instructions and education reviewed with patient, verbalized understanding, papers signed. Left facility escorted by staff.

## 2022-11-27 NOTE — DISCHARGE SUMMARY
DATE OF ADMISSION:  2022     ADMITTING DIAGNOSES:  1.  Intrauterine pregnancy at 41 weeks.  2.  Postdates.     Please see previously dictated history and physical.     HOSPITAL COURSE:  The patient was admitted to labor and delivery with the   above diagnoses on 2022.  She had her labor induced using misoprostol,   but ultimately only was able to get to 4 cm dilated.  She subsequently   underwent a primary lower uterine segment transverse  section on   2022 for secondary arrest of descent and dilation.  She had a viable   male infant, weight 3870 grams, Apgars 8 and 9.  She had an estimated blood   loss for the delivery of 300 mL. The patient's postpartum and postoperative   course have been unremarkable and on postoperative day #2, she was doing well.    Her pain was controlled. She was tolerating a regular diet.  She had minimal   lochia.  She was passing flatus, was working on breastfeeding, and was ready   to be discharged home.  She also had no problems voiding.     PHYSICAL EXAMINATION ON DISCHARGE:    VITAL SIGNS:  Temperature is 97.9, pulse 100, blood pressure 131/85.  ABDOMEN:  Soft, nondistended, minimally tender to palpation.  Bowel sounds are   positive and normoactive.  Her fundus is firm below the umbilicus.  Her   Mepilex dressing had been removed yesterday and was replaced with Tegaderm and   gauze.  There was no evidence of any active bleeding.  EXTREMITIES:  Showed trace pedal edema.  She had no cords or Homans sign.     LABORATORY DATA:  Her postoperative H and H were 9 and 28 and her blood type   is A positive.     DISCHARGE DIAGNOSES:  1.  Intrauterine pregnancy at 41 and 1/7 weeks.  2.  Status post primary lower uterine segment transverse  section for   arrest of dilation and descent.  3.  Anemia.     DISCHARGE INSTRUCTIONS:  1.  The patient will be discharged home with instructions to follow up with   Dr. Parry in 2 weeks.  2.  She is instructed on pelvic  rest for 6 weeks, no driving for 2 weeks, and   no lifting over 10 pounds for 6 weeks.  3.  She is given a prescription for Roxicodone.  She should take   over-the-counter ibuprofen and Tylenol for pain as well as prenatal vitamins   if she is successfully with breastfeeding.  4.  She is encouraged to continue over-the-counter iron and vitamin C.        ______________________________  MD GARY COLEMAN/CRISPIN    DD:  11/27/2022 08:05  DT:  11/27/2022 10:49    Job#:  [id number]    CC:MIGUELINA BORRERO MD

## 2022-11-27 NOTE — PROGRESS NOTES
1945 Assumed care of patient at change of shift.  Discussed plan of care with patient and answered all questions.      2130 Assessment completed.  Fundus firm, lochia light.  Abdominal incision with gauze and transparent dressing intact. Patient will call if pain intervention needed. Discussed feeding cues, feeds q 2-3 hours, pumping, paced feedings with enfamil.

## 2022-11-27 NOTE — DISCHARGE INSTRUCTIONS

## 2022-11-27 NOTE — CARE PLAN
The patient is Stable - Low risk of patient condition declining or worsening    Shift Goals  Clinical Goals: Patient will maintain stable VS; Lochia WDL; Pain WDL  Patient Goals: pain control, bonding with infant, feeding infant  Family Goals: bond and support    Progress made toward(s) clinical / shift goals:    Problem: Risk for Venous Thromboembolism (VTE)  Goal: VTE prevention measures will be implemented and patient will remain free from VTE  Outcome: Met     Problem: Risk for Infection and Impaired Wound Healing  Goal: Patient will remain free from infection  Outcome: Met  Goal: Patient's wound/surgical incision will decrease in size and heals properly  Outcome: Met     Problem: Risk for Fluid Imbalance  Goal: Patient's fluid volume balance will be maintained or improve  Outcome: Met     Problem: Pain - Standard  Goal: Alleviation of pain or a reduction in pain to the patient’s comfort goal  Outcome: Met     Problem: Knowledge Deficit - Postpartum  Goal: Patient will verbalize and demonstrate understanding of self and infant care  Outcome: Met     Problem: Psychosocial - Postpartum  Goal: Patient will verbalize and demonstrate effective bonding and parenting behavior  Outcome: Met     Problem: Altered Physiologic Condition  Goal: Patient physiologically stable as evidenced by normal lochia, palpable uterine involution and vitals within normal limits  Outcome: Met     Problem: Infection - Postpartum  Goal: Postpartum patient will be free of signs and symptoms of infection  Outcome: Met     Problem: Respiratory/Oxygenation Function Post-Surgical  Goal: Patient will achieve/maintain normal respiratory rate/effort  Outcome: Met     Problem: Bowel Elimination - Post Surgical  Goal: Patient will resume regular bowel sounds and function with no discomfort or distention  Outcome: Met     Problem: Early Mobilization - Post Surgery  Goal: Early mobilization post surgery  Outcome: Met       Patient is not progressing  towards the following goals:

## 2022-12-01 ENCOUNTER — DOCUMENTATION (OUTPATIENT)
Dept: OBGYN | Facility: CLINIC | Age: 37
End: 2022-12-01
Payer: COMMERCIAL

## 2022-12-01 ENCOUNTER — PATIENT MESSAGE (OUTPATIENT)
Dept: OBGYN | Facility: CLINIC | Age: 37
End: 2022-12-01
Payer: COMMERCIAL

## 2022-12-01 NOTE — PROGRESS NOTES
"Note sent to Brooke:    Hi Brooke Camara said you called about having your breastmilk tested then she lost connection with you.    Joe, the lactation consultant who worked with you in the hospital wrote: \"Referral to [facility] [place] [facility] for this week was submitted as mother has concerns over her breast discharge. She is continuing to use the breast pump every 3 hours and hand express. She is expressing a dark green watery liquid.\"    I would be happy to help you hand express a sterile specimen that we can send to the lab. I can do that Monday in between other familles that have scheduled appointments.     Would 1130a, Monday work for you?     Juan A CUNNINGHAM, IBCLC    "

## 2022-12-06 ENCOUNTER — APPOINTMENT (OUTPATIENT)
Dept: OBGYN | Facility: CLINIC | Age: 37
End: 2022-12-06
Payer: COMMERCIAL

## 2022-12-16 ENCOUNTER — APPOINTMENT (OUTPATIENT)
Dept: OBGYN | Facility: CLINIC | Age: 37
End: 2022-12-16
Payer: COMMERCIAL

## 2022-12-16 NOTE — PROGRESS NOTES
Summary: Mom had mycharted about 'green milk' after birth. History of reduction surgery. In consult with Breast surgeon/IBCLC it ws considered debris not yet cleared and mom agreed to not culture as recommended by the Dr. Supply had started to increase and the green decreased.  Today: ***  Plan: ***  Follow up:   Lactation appointment: {Lac Appt:21220}  Pediatrician appointment: {Peds Follow up:75529}  Referrals: {Lac Referrals:59906}    Subjective:     Brooke Young is a 37 y.o. female here for lactation care. She is here today with {Company:46701}.    Parts of the chart were copied from [id number] as they were consistent for the mother baby dyad, adjusting for what is specific to the baby.    Brooke Young is a ***  female here for lactation care. History is provided by ***    Concerns:   Maternal: {Concerns:30741}  Infant: {Concerns:96125}    HPI:   Pertinent  history: {History:95865}    Mother does not have a history of {Low Supply:94409}. Common condition(s) which may interfere with milk supply.    Mother does have {Low Supply:64895}. Common condition(s) that may interfere in milk supply.    Breast changes in pregnancy: {Yes/Minimal/No:88351}  History of breast surgeries: {Lac surgery:60344}    FEEDING HISTORY:    Previous Breastfeeding History: First baby. ***  Hospital Course: ***  Currently 2022: ***     Both breasts: {Yes/No:}    Supplement: {Supplement:79673}  Quantity: ***  How given/devices:  {Device:87292}  Bottle/nipple type: ***    Nipple Shield Use: {Lac nipple shield:01717}    Breast Pumping:   {Lac pumpin}  {Pain/No Pain:43187} with pumping    Maternal ROS:  Constitutional: No fever, chills. Feeling well  Breasts: {ROS Breasts:49769}  Psychiatric: {ROS Psychiatric:51400}  Mental Health: No mention of feeling irritable, agitated, angry, overwhelmed, apathetic, appetite changes, exhausted nor having sleep changes outside infant  feeds/demands.  Current Outpatient Medications on File Prior to Visit   Medication Sig Dispense Refill    Ferrous Sulfate (IRON PO) Take 1 Tablet by mouth.      Prenatal MV-Min-Fe Fum-FA-DHA (PRENATAL 1 PO) Take 1 Tablet by mouth.       No current facility-administered medications on file prior to visit.      No past medical history on file.     Infant ROS   Constitutional: Good appetite, content. Negative for poor po intake, negative for weight loss. Fussy***. Gassy. ***  Head: Negative for abnormal head shape, negative for congestion, runny nose.  Eyes: Negative for discharge from eyes or redness.   Respiratory: Negative for difficulty breathing or noisy breathing.  Gastrointestinal: Negative for decreased oral intake, vomiting, excessive spitting up, constipation or blood in stool.   No concerns about umbilical stump***  Concerned that umbilical stump is  24 hour stooling pattern ***/24 hours  Genitourinary:  24 hours voiding pattern ***  Musculoskeletal: Negative for sign of arm pain or leg pain. Negative for any concerns for strength and or movement.  Skin: Negative for rash or skin infection.  Neurological: Negative for lethargy or weakness.     Objective:     Maternal Physical Lactation Exam  General: No acute distress  Breasts: {Breasts:77947}  Nipples: {Nipples:02669}  Psychiatric: ***Normal mood and affect. Her behavior is normal. Judgment and thought content normal.  Mental Health: ***Did NOT exhibit sadness, crying, teary, feeling overwhelmed, agitation or hypervigilance.    Infant Physical Lactation Exam:   General: This is an alert, active infant in no distress  Head: Normocephalic, atraumatic, anterior fontanelle is open soft and flat.   Eyes: Tear ducts draining well  No conjunctival infection or discharge.   ***Right /left eye closed more often than other.  Discharge clear, yellow, green amount from the Right/left eye.   Nose: Nares are patent and free of congestion  Oral: Tongue lift ***%, Tongue  extension ***, Lingual frenum appearance Coryllos type ***, Maxillary labial frenum appearance Kotlow class ***  Oral exam revealed no gross anatomical deficits, no tight oral tissue was observed  .infantoral  Pulmonary: No retractions, no nasal flaring or distress, Symmetrical chest expansion  Abdomen: Soft. ***Umbilical cord is dry.  Site is dry and non-erythematous.   MSK Extremities are without abnormalities. Moves all extremities well and symmetrically.    ***Low High Normal tone   Shoulders to neck  Neuro: Normal radha, normal palmar grasp, rooting, vigorous suck  Skin: Intact, warm dry and pink   Mild jaundiced on the {Lac jaundice:28161}.    Infant Weight gain: {Lac weight:56898}    Hydration: Infant is well hydrated, good capillary refill, skin pink, good turgor.    Assessment/Plan & Lactation Counseling:     Infant Exam on Infant Chart    Infant Weight History:   22 8#8.5oz  2022 ***  Infant Intake at Breast: ***     ***    Total: ***  Milk Transfer at this feeding:   {Milk Transfer Effective/Ineffective:00027}  Milk transfer impacted by ***   Tongue restriction   Tightness of neck and back   Neck preference   Hyper reflexive     Pumped: Not indicated ***  Type of Pump: {Type of Pump:37224}    Quantity Pumped: L ***    R ***    Total: ***  Initiation of Feeding: {Initiation:41975}  Position of Feeding:    Right: {Position of Feedin}  Left: {Position of Feedin}  Attachment Achieved: {Attachment Achieved:64456}  Nipple shield: N/A ***    Size: ***mm       Introduced ***  Latch achieved ***  Difficult Latch Due To: ***  Position and latch  Low milk supply  Poor milk transfer  by infant  Oral/motor structure/function inhibited  Alberts breasts  Suck Pattern at the Breast: {Breast:51210}  Suck Pattern on the Bottle: Not Indicated***   {Bottle:61166}  Behavior Following Observed Feeding: {Behavior:76228}  Nipple Pain: None ***  Nipple Pain from:{Nipple Pain From:45685}     Latch:  {Latch:60866}  Suckling/Feeding: {Suckling/Feedin}  ***Sucking strength: Low Moderate Strong  Sucking Rhythm Coordinated Uncoordinated.   Compression: WNL Abnormal  Once latched, baby fell into an immature and not fully integrated SSB pattern.    Infant fatigued after *** minutes, with no further oral readiness noted, so feeding was ended in order to ensure              neuro protection and positive feeding experiences.    Once latched, baby fell into a mature and fully integrated SSB pattern.    Swallowing No difficulty noted  Milk Supply Available: {Milk Supply:96603}    Low Milk Supply:   Likely due to: {Low Milk Supply:72572}    Maternal Diagnosis/Problem:  {Lac Maternal DX:14205}    Infant Diagnosis/Problem:  {Lac Infant DX:26805}    INFANT BREASTFEEDING PLAN  Discussed with family present detailed plan for establishing/maintaining family specific goals with breastfeeding available on Mom’s My Chart   Infant specific:     MATERNAL PERSONALIZED BREASTFEEDING PLAN  Discussed concerns and symptoms as listed above in assessment and guidance summarized below. Shared decision making was used between myself and the family for this encounter, home care, and follow up. Topics reviewed included:   ***4th Trimester: The 12-week period immediately after mom has had the baby. Not everyone has heard of it, but every mother and their  baby will go through it. It is a time of great physical and emotional change as the baby adjusts to being outside the womb, and the family adjusts to new life as parents  During the fourth trimester, one can expect fussiness and crying from the baby and very likely exhaustion for the family.  babies are learning to adjust to life outside the womb where it was warm and squishy!  There is a lot of misinformation about babies and their needs, and parents are often encouraged to ignore baby's signals. Bad idea. Babies are “half-baked” at birth and have much to learn with the  "help of physical and emotional support from caregivers. Taking care of a baby's needs is an investment that pays off with a happier, healthier child and adult.  It can take weeks or even months for your body to feel totally normal again. There is a major hormonal upheaval experienced by moms in the first few weeks after birth, because their bodies are shifting from many pregnancy hormones to a more normal hormonal make-up.  These first three months with baby earthside is a delicate time. Honor it with a mindful dose of support. Mindful Mamma's is an shamika that may help.   ***Self-Compassion through Relational Support and Interaction.   Be kind to ourself. This is the first step in reducing anxiety and depression. Pay attention to how you are doing.   What do you need? Food, Rest, Sleep, Support, to Laugh/giggle: who will you ask today? They want to help you. We want to help you.   How do you stop your self-blame, or your self-criticism?   Get out of the house each day, walk or drive somewhere or ask a friend to drive you,  a \"treat\" at a drive through.  *** Mood and Anxiety Disorders: Having a new baby can be joyful time for some new moms, but a difficult time for others. For all, it is a time of profound physical and emotional change. Balancing baby, family, partners and friends, work, pets, and preexisting or new life stressors as well as sleep deprivation can be extremely challenging. Untreated depression, sleep exhaustion, and anxiety are each a challenge that must be addressed and in addition can contribute to early cessation of breastfeeding. It is important both for the mental health and physical health of new moms and babies to get on the path to feeling better and if therapeutic support is needed, specific resources are available, please ask.   ***Why is Breastfeeding so Challenging? AAP and Infant Feeding Guidelines recognizes the barriers and challenges that exist, society and healthcare " often miss them.  It can be devastating when someone highly motivated to exclusively nurse cannot. When a (parent) discovers (they) can’t do what others seem to do so naturally, it is a genuine loss...that deserves to be acknowledged and mourned    ***Triple Feeding and its sustainability and its impact on the mother baby relationship  ***Sleep or Lack of: discussed strategies to manage restorative sleep, although short amounts, significant to the mental health of the mother. The principle follows:  Mom goes to sleep right after 8pm +/- feeding  Partner covers first late evening feeding (10pm/11pm), settles baby,  then goes to bed  Mom covers next feeding 1am /2am, partner sleeps  Next feeding share  ***Breastsleeping (.breastsleeping) Discussed and referred to Academy of Breastfeeding Medicine protocol on safe sleeping 2019. The triple risk model proposes that SIDS occurs when an infant with intrinsic vulnerability (often manifested by impaired arousal, cardiorespiratory, and/or autonomic responses) undergoes an exogenous trigger event (eg, exposure to an unsafe reactive sleeping environment) during a critical developmental period. The American Academy of Pediatrics recommends a safe sleep environment to reduce the risk of all sleep-related deaths.  ***The nature of infants oral head/neck structure and function and its impact on latch and transfer of milk.   Discussed ***  .oralmotor  .neckpreference  Physical Therapy or Cranial SacralTherapy  referral for mary Jennings: [phone]  ***Milk Supply is dependent on glandular tissue development, hormonal influences, how many times the baby removes milk and how well the breasts are emptied in a 24 hour period. This is a biological reality that we can NOT work around. If, for any reason, your baby is not latching, or you are not able to nurse, then it is important for you to remove the milk instead by pumping or hand expression.  There's no magic trick, tea,  food, drink, cookie or supplement that will increase your milk supply. One  must  effectively remove milk to continue to make and maximize milk. In the early days and weeks that can be 8+ times in 24 hours. For older babies, on average 6-7 + times in 24 hours.    ***Hydration: Staying hydrated is important however lack of hydration is usually not a cause of significantly low milk production.  Everyone needs a different amount of water, depending on their activity and diet. A high salt and/or high-protein diet, high physical activity, or very warm weather/sweating will require more fluids. A person eating a diet high in veggies and fruit, with a lack of physical activity will require fewer fluids. There is no magic number for the # of ounces of water each day.The best way to know that you are well hydrated is by looking at your urine.  Urine should look clear to light pale yellow, and you should need to pee at least every 3 to 4 hours unless you have a large bladder capacity.  ***Low Milk Supply: [place] births 3.6million, 5-15% chronic insufficient production, 180,000- 500,000 individuals - not as rare as we think (Darek et al, 2021). Your common factors:  Insufficient breast development, no growth in pregnancy, wide spacing, minimal tissue medially or posteriorly. High androgens, insulin resitance  Milk never came in the first 7-10 days with normal breast tissue: Thyroid, pituitary insult, retained placent, theca lutein cyst, prolactin problem, medications, herbs  Lack of nipple/breast stimulation (Baby at the breast but not suckling, mostly non nutritive sucking)  Lack of breast emptying (Baby at the breast but no removing much milk)  Maternal illness, post partum hemorrhage, high BMI's, medication, extreme calorie restriction  ***Hypertensive Disease in PG (HDP) (preeclampsia, Gestational hypertension and eclampsia)is associated delayed lactogenesis ll and reduced  milk production:   delayed initiation of  breastfeeding (e.g., mother ill,  difficult birth, mother?infant separation) ? decreased  breast stimulation   Postpartum edema (3rd spacing of fluid) ? lower   intravascular volume ? poor diffusion of fluid into  mammary alveoli ? delayed onset of copious milk  production    postpartum breast edema may also contribute to difficulties  with latch; sore nipples   role of underlying metabolic condition    MgSO4 may impact milk supply  (Burak et al, 2018):   ***Herbs and Medications  A galactagogue is an herb or medication taken by a breastfeeding mother to increase her milk supply. We know that for centuries mothers around the world have sought out remedies to increase their milk supply. However, there is limited research on the safety and effectiveness of herbal galactagogues, which makes it hard for us to endorse them. It is not known if any of these herbs are truly effective, and it is difficult to predict how a specific herbal galactagogue will affect an individual, requiring “trial and error” in many situations. When effective, results are generally seen within 24-72 hours of starting an herbal galactagogue. Many of these herbs are used to decrease high blood sugar. If you are diabetic or have problems with low blood sugar, or thyroid disease you may not be a candidate for herbs. Not all women can increase their low milk supply with a galactagogue due to the many underlying causes of low milk production.  When taking a galactagogue, remember that frequent milk removal is still the most effective way to increase supply.   ***Feeding:   ***Infant feeding well given current interval growth, guidelines to follow:  ***Infant difficulty with feeding. Guidelines to follow:  Feed your baby every 1.5-3 hours, more often if baby acts hungry.   Awaken baby for feeding if going over 3 hours in the day.   Until back to birth weight, ONE four hour at night is acceptable if has had 8 prior feedings in 24 hours.    Daily goal:  8-12 feedings per 24 hours.   *** Given infants weight you may allow baby to go longer at night but that generally means shorter durations in the day.  *** Supportive measures for feeding: Swaddle, elevated side lying position, gently chin/cheek support as needed, bottle pacing.   ***Supplement:   ***No supplement is needed  ***Supplement with expressed milk  Supplement with formula  *** Proper powder formula preparation: https://www.cdc.gov/nutrition/downloads/prepare-store-powered-wabpcv-iipzqzp-057.pdf.   For babies under 2 months: https://www.cdc.gov/cronobacter/infection-and-infants.html  ***When bottle-feeding, there are three primary things to consider:    Nipple Shape:  Look for a nipple that looks like a “breast at work” not a “breast at rest.”  A “breast at work” has a somewhat cone shape as the nipple and breast tissue is pulled into the baby’s mouth while feeding.  Your baby’s mouth should be able to go around the widest part of the nipple to form a wide-open gape on the bottle like that of a good latch at the breast. In contrast, a breast at rest might look more like, well, a breast: a roundish base with a  nipple.  If the bottle looks like this, your baby’s lips may not be able to get around the widest part of the nipple because it is just too wide resulting in a narrow gape that would hurt your nipple. This nipple shape may also make it difficult for your baby to make a complete seal with their lips which leads to air intake and milk spillage.Wide or narrow neck bottles are your choice.   Flow Rate of the Nipple:  The nipple flow should be slow.  Don’t just read the label, but notice how your baby is feeding and trust what you observe.  A study done a few years ago found that “slow flow” varied widely between brands and even between nipples of the same brand.  Try a few nipples until you find one that results in a rhythmic sucking pattern but not chugging and gulping.  Pacing the feeding:  A slow flow  nipple helps, but how you feed the baby is more important.  Good positioning can compensate for a faster flow nipple.  When bottle-feeding, the baby should control how much is consumed at a feeding.  Holding the baby in an upright position with the bottle horizontal ensures that the baby gets milk only when sucking.  Here is a nice video demonstrating this concept of paced bottle feeding,  https://www.youtube.com/watch?v=FaMFI5eUA5H  ***Pacifier Use:  The American Academy of Pediatrics' Position Paper reports: Although we recommend a conservative approach regarding pacifier use, we do not endorse a complete ban on the use of pacifiers, nor do we support an approach that induces parental guilt concerning their choices about the use of pacifiers. Pacifier use and breastfeeding in term and  newborns- a systematic review and meta-analysis from the  J of Pediatrics Published online 2022. Has found that when pacifiers are used among individuals who have been counseled on the risks, do not interfere with breastfeeding exclusivity or duration. These are parental choices.  ***Nipple shield (NS): We prefer the 24mm Medela.   Before applying, roll the shield in on itself (like a sombrero, and allow breast to be pulled  in to the shield tip).   The latch is very different from the bare breast, bring the baby to you and let them find the nipple shield and they will manage it, tuck them close once they find it, cheek against breast.   Once you and your baby are familiar with the NS, you may be able to just put it on the breast and latch the baby without any preparation.    ***Positioning Techniques for Bare Breast  Pillow used: Sharla Balderrama  Suggested positions {Suggested Positions:48801}  Fine tune position by making sure your fingers beneath the breast as well as your bra, are out of the way of your baby's chin.  Positioning: See  "http://globalhealthmedia.org/portfolio-items/positions-for-breastfeeding/?voxybtrkcAP=55735  ***Latch on Techniques for Bare Breast.   Modify for nipple shield use soon enough you will move back to bare breast.  Aim is an asymmetric deep latch.  First make yourself comfortable. Sit with knees bent (unless lying down), feet on a stool if needed. You will support your baby, and pillows will be used to support YOU. You want your baby's belly facing your breast/body (belly to belly). If your baby is extremely fussy and crying, do skin-to-skin for a while until he or she calms down, then try (or try again).   Fine tune latch:  By holding your baby more securely at the breast, assisting your baby to stay attached by:  Support your baby with your hand behind the shoulder blades (NOT THE HEAD).  1. Align your baby's NOSE with your nipple  2. Your baby's chin should be the first part of his or her body to touch your breast  3. Tickle the baby's upper lip or nose with your nipple  4. When your baby's mouth is WIDE OPEN, swiftly bring your baby's mouth over your nipple/areola.  Steps 2 and 3 could be slightly reversed order or essentially happening at the same time.  Bringing your baby to your breast, not breast to the baby  Your baby's cheek to touch breast securely, nose tipped back  Hold your baby firmly in place so when your baby forgets to suck and picks it back up again your baby is in the correct spot. You will be extinguishing behavior and replacing it with a deeper latch to stimulate suck and provide satisfaction at the breast.  Your baby needs as much breast as deep in the mouth as possible to allow your nipples to heal and for you more importantly to maximize efficiency at the breast  If that doesn’t help, you should make an appointment with me to re-evaluate.   Latch is asymmetrical, leading with the chin, getting the baby below the breast, as if offering a large \"deli cathi sandwich\".  Here is a video from DAHIANA " on the asymmetric latch- https://www.youtube.com/watch?v=0I-QSw1Ff71  ***Triple Feeding: A short term solution: Breast/bottle/pump: Consider mostly double pumping as baby prepares for more robust feeding sessions  If triple feeing, FIRST decide what you can do at that feeding and if you decide to double feed -pump and bottle, that is sufficient.  If you are going to offer the breast at that feeding:  nurse first and limit time on the breasts to 20+/- min total  finish with bottle  pump afterwards to finish emptying your breasts which will help increase milk supply  As baby becomes more effective, evidenced by not pumping much milk after a breastfeeding, we will create a plan to decrease pumping.  Use your own pumped milk, donor human milk, or formula.  ***Pumping Guidelines:  Both breasts   Sustaining a healthy baseline prolactin level is vital- this means feeding/pumping at least every 3 hours with no more than a 5 hour break at night.   Pumping is effective but can quickly exhaust and overwhelm a new mother  Suggested Pump Routine: 1am, 5am, 8am, 10am, 1pm, 4pm  7pm, and 9:30pm  Pump *** times in 24 hours  Bra    Consider a hands free bra - Simple Wishes is recommended.  Cake Maternity or Milkmaid for larger size bras  Type of Pump:  {Type of Pump:21606}  [employer] Kiana Settings http://www.[employer]/healthcare-professionals/videos/ameda-platinum-with-hygienikit-video  Speed: Start at 80 then turn down to 60 after 1.5-2 minutes when you see milk in the flange channel  Suction: To comfort, goal of  31%. NEVER to discomfort.   Today you were at ***   Spectra Settings  Press letdown button when first starting         Cycle: 70 / Vacuum: L1 - L 5 (To comfort)  Once milk lets down, press letdown button again  Cycle: 54 / Vacuum: L1 - L12 (To comfort)  Always double pump  How long will vary woman to woman, typically 8-15 minutes, or 1-2 minutes after flow slows  Flange Fit: Freely moving nipple in the tunnel with some  "movement of the areola.  Today's evaluation indicates {Flange Fit:84159}  ***Caution with Breast Massage The word “Massage” means different things in the breastfeeding world. It is described and interpreted in so many different ways and unfortunately potentially harmful. The hands can be safe on a breast and  gentle to help in many ways but they also can be damaging when used in the wrong way.  Lymphatic drainage massage is appropriate,  open hand , lightly stroking only, and can be highly effective. The massage advice to knead , massage deeply , vibrate with marketed tools is  most concerning. Be gentle with this gland to not increase inflammation.     ***Storage (Acceptable guidelines for healthy term babies)  10 hours at room temp including your pieces, may rinse but not mandatory  8 days refrigerator, don't need  to refrigerate right away if using fresh pumped milk at the next feeding  Freezer 1 year  Deep freezer 2 years  [employer] or [employer] has said you may mix different temperature milks.   If baby drinks breastmilk from a fresh or refrigerated bottle of breastmilk,  you may return the unused portion to the refrigerator and use once at next feeding.   ***Increasing supply besides Galactogogues and Pumping:  {Galactogogues and Pumpin}  [facility] [email or web address]  [facility] [email or web address]  ***Nipple care:    {Nipple Care:66100}  Hydrogels Soothies when you need to provide moisture, coconut oil is not thick enough  ***Polymem for when you have fibrinous debris that is very 'soupy\" that you need to debride as polymem will absorb more than hydrogel)  ***Silverettes (Knockoffs not recommended as they may contain nickel which is allergenic)   Put a few drops of milk in the cup  Place over nipple, no lotions or creams  Allow bra to hold them in place  ***Breast Care  Plugs (a colloquial term for microscopic ductal inflammation and narrowing)  " Inflammatory or infectious mastitis, breastpain including engorgement  or vasospasm  Breast rest - No Massage, don't overfeed of over pump, down regulate production if needed  Advil 800mg every 8 hours for 48 hours with food  Ice - 10 minutes  after feeding then every 30 minutes or as desired for repeating the ice. Don't put the ice directly on the skin.  May add Tylenol 1000mg every 8 hours for 48 hours in between the Advil dosing if needed for pain.    ***Answers to FAQs: [email or web address]  Alcohol & Breastfeeding: What's your time-to-zero?  Cough & Cold Medications while Breastfeeding  Vitamin D Supplementation and Breastfeeding  Antidepressant Use While Breastfeeding: What should I know?  Breastfeeding, Caffeine, and Energy Drinks      Connect with other mothers:  Facebook:   [place] [facility]: [email or web address]  Well-Nourished Babies (Private group for questions and support): [email or web address]  Breastfeeding [place] [facility]  WEIGHT CHECKS  Tuesdays 10am - 11am. [facility] at [address] [place] [address], [facility] [address] [place] [address], 3rd floor conference room  Check your baby's weight, do a feeding and see how your baby is growing, visit with other mothers, plan on a walk or coffee date after group.  Please wear a mask coming and going: you may remove it in the classroom  Due to space limitations - limit strollers please (New c/section moms please use your stroller).  We would love to have dads stay, but moms won't breastfeed if there are men in the room, sorry.  The room is generally scheduled for another event following group.  Please take all diapers with you   ONLINE SUPPORT GROUPS  [employer] ([employer]) support groups are conducted using a [id number]-vp-[id number] support model and are not intended for those experiencing a mental health crisis. Groups are 90 minutes (1.5 hours) in length. The first ~30 minutes is providing information,  education, and establishing group guidelines. The next ~60 minutes is “talk time,” in which group members share and talk with each other. Group members must be present for the group guidelines before joining in the discussion or “talk time.”       Position, Latch and Pumping discussed and plan provided. (Documented on moms chart).     Infant Exam Summary:    Healthy 3 week old.  Anticipatory guidance was provided regarding feedings.   Weight *** good interval growth:  Created a plan to meet family's breastfeeding goals.  Patient learning to breastfeed and needs ***  Patient with mild jaundice on ***  Patient  with blocked tear duct ***  Patient gassy or fussy ***  Patient referred to ***    Contact Breastfeeding Medicine    or your Pediatrician for any of the following:   Decreased wet or poopy diapers  Decreased feeding  Baby not waking up for feeds on own most of time.   Irritability  Lethargy  Dry sticky mouth.   Any breastfeeding questions or concerns.    In Conclusion:   Managing breastfeeding and milk supply is very dynamic,can be a complex and intimate journey, and is not one size fits all. When obstacles present themselves, it takes confidence, persistence and support. The rights of the child include optimal nutrition and mothers need help to make informed decisions. You  and your baby have been screened for biological, psychological, and social risk factors that might interfere with achieving your goals.  Support is critical. You are now the focus of our Breastfeeding Medicine team; we are here to support your decisions and vision.     Follow up ***requires close monitoring in this time sensitive window of opportunity to establish milk supply and facilitate the learning of  breastfeeding.    Follow-up for infant weight check and dyad breastfeeding evaluation:  *** {DAYS, WEEKS, MONTHS, YEARS:66702}  Please call [phone] our voicemail line or the front office to scheduled your next appointment.  Family is  encouraged to e-mail or mychart us to update how the plan is working.    A total of *** minutes, not including infant assessment time, with more than 50% was spent preparing to see the patient, obtaining and reviewing separately obtained history, performing a medically appropriate examination and evaluation, counseling and educating the family, ***ordering medications, test or procedures, ***referring and communicating with other health care professionals, documenting clinical information in the electronic health record, independently interpreting weighted feeds and infant growth results, communicating these results to the family and care coordination as detailed in the above note.       LARS Stiles.

## 2022-12-27 ENCOUNTER — OFFICE VISIT (OUTPATIENT)
Dept: OBGYN | Facility: CLINIC | Age: 37
End: 2022-12-27
Payer: COMMERCIAL

## 2022-12-27 DIAGNOSIS — Z91.89 AT RISK FOR POSTPARTUM DEPRESSION: ICD-10-CM

## 2022-12-27 DIAGNOSIS — O92.79 LACTATION DISORDER, POSTPARTUM CONDITION: ICD-10-CM

## 2022-12-27 PROCEDURE — 99215 OFFICE O/P EST HI 40 MIN: CPT | Performed by: NURSE PRACTITIONER

## 2022-12-28 NOTE — PROGRESS NOTES
Summary: Difficulty in the hospital with producing only green milk, secondary to reduction surgery. C/Section prevented her from driving to her OP appointment for breastfeeding, but many questions answered via mychart and virtual visit. She continues baby at the breast up to 5x/day but he doesn't sustain she reports.Today she is here in person with baby, pumped last almost 20 hours ago, baby at breast about 18 hours ago, she is unable to keep him latched. Baby is growing with excellence, 21oz in the last 13 days.   Today: Assisted baby to breast and he sustained with being deliberate about helping him stay there. After about 12 minutes on the left and 6 minutes on the right he indicated he was finished. He removed 0ml total of a scale sensitive to 2ml per weight.  Mom pumped with the platinum pump for less than 2ml total.   Plan: Mom has maximized supply in the beginning with pumping more regularly and baby at the breast. Her production is very low secondary to reduction surgery and probably a lack of glandular tissue despite the breast size. She has worked exceptionally hard to make milk and now we had a discussion about the future of milk production for her. She wants to breastfeed and has done nothing herself to not make milk proven by the exceptionally low supply.   Follow up:   Lactation appointment: As needed and Group Encouraged  Baby 's Provider appointment: 2 Month Well Child Check   Referrals: [facility]   2022    Subjective:     Brooke Young is a 37 y.o. female here for lactation care. She is here today with baby.    Concerns:   Maternal: latch on difficulties  and feeling that there is not enough milk   Infant: baby not interested    HPI:   Pertinent  history: c/section    Mother does not have a history of GDM, hypertension prior to pregnancy, GHTN, insulin resistance, multiple gestation, PCOS, thyroid disease, and auto immune disease .  Common condition(s) which may interfere with milk supply.    Mother does have advanced maternal age and a reduction surgery 12 years ago. Common condition(s) that may interfere in milk supply.    Breast changes in pregnancy: Yes  C cup to DDD  History of breast surgeries: Reduction    FEEDING HISTORY:    Previous Breastfeeding History: First baby.   Hospital Course: Difficulty in the hospital with producing only green milk, secondary to reduction surgery.   Currently 12/27/2022: C/Section prevented her from driving to her OP appointment for breastfeeding, but many questions answered via Blink[employer] and virtual visit. She continues baby at the breast up to 5x/day but he doesn't sustain she reports.Today she is here in person with baby, pumped last almost 20 hours ago, baby at breast about 18 hours ago, she is unable to keep him latched. Baby is growing with excellence, 21oz in the last 13 days.      Both breasts: Yes    Supplement: Supplementation initiated inpatient, Expressed breast milk, and Formula  Quantity: 4oz +/-  How given/devices:  Bottle  Bottle/nipple type: Rita glass bottles, Level 2    Nipple Shield Use: None    Breast Pumping:   Frequency currently 0-2x/24 hours, Quantity obtained minimal, enough to make the bottles wet, Type of pump Rita [employer], and Flange size/type 24mm  NO pain with pumping even at 91%    Maternal ROS:  Constitutional: No fever, chills. Feeling well  Breasts: No soreness of breasts and No soreness of nipples  Psychiatric: Experiencing anxiety and Feels exhausted, has discussed resources with her pediatrician  Mental Health: No mention of feeling irritable, agitated, angry, overwhelmed, apathetic, appetite changes, exhausted nor having sleep changes outside infant feeds/demands.  Current Outpatient Medications on File Prior to Visit   Medication Sig Dispense Refill    Ferrous Sulfate (IRON PO) Take 1 Tablet by mouth.      Prenatal MV-Min-Fe Fum-FA-DHA (PRENATAL 1 PO) Take 1 Tablet by  mouth.       No current facility-administered medications on file prior to visit.      No past medical history on file.      Objective:     Maternal Physical Lactation Exam  General: No acute distress  Breasts: Symmetrical , Soft, Plugged Duct - no evidence, and Mastitis  - no S/S  Nipples: intact  Psychiatric: Normal mood and affect. Her behavior is normal. Judgment and thought content normal.  Mental Health: Did NOT exhibit  crying, agitation or hypervigilance. Is sad, isolated and no community support. Family is supportive    Assessment/Plan & Lactation Counseling:     Infant Exam on Infant Chart    Infant Weight History:   11/20/22 8#8.5oz  11/29/22 8#1.1oz  12/14/22 9#0.6oz  12/27/2022 10#5.5oz    Infant Intake at Breast: 0ml left      0ml right     Total: 0ml  Milk Transfer at this feeding:   Effective breastfeeding, supply not available     Pumped: Type of Pump: Platinum    Quantity Pumped: L 1.0ml    R 0.5ml    Total: 1.5ml  Initiation of Feeding: Infant initiates  Position of Feeding:    Right: cross cradle  Left: cross cradle  Attachment Achieved: rapidly  Nipple shield: N/A      Difficult Latch Due To:   Low milk supply  Suck Pattern at the Breast: Suck burst and normal rest mostly non nutritive  Suck Pattern on the Bottle: Suck burst and normal rest  Behavior Following Observed Feeding: content  Nipple Pain: None     Latch: Assisted latch  Suckling/Feeding: attaches, baby fed effectively, baby roots, elicits ESTELLE, frequent pauses, and rhythmic  Sucking strength:  Moderate Strong  Sucking Rhythm Coordinated  Compression: WNL   Once latched, baby fell into a mature and fully integrated SSB pattern.    Swallowing No difficulty noted  Milk Supply Available: low    Low Milk Supply:   Likely due to: maternal medical history, ineffective or infrequent breast stimulation or milk removal, and breast reduction surgery and the tissue prior to reduction    Maternal Diagnosis/Problem:  Lactation Disorder- Baby not  latching       MATERNAL PERSONALIZED BREASTFEEDING PLAN  Discussed concerns and symptoms as listed above in assessment and guidance summarized below. Shared decision making was used between myself and the family for this encounter, home care, and follow up. Topics reviewed included:   4th Trimester: The 12-week period immediately after mom has had the baby. Not everyone has heard of it, but every mother and their  baby will go through it. It is a time of great physical and emotional change as the baby adjusts to being outside the womb, and the family adjusts to new life as parents  During the fourth trimester, one can expect fussiness and crying from the baby and very likely exhaustion for the family.  babies are learning to adjust to life outside the womb where it was warm and squishy!  There is a lot of misinformation about babies and their needs, and parents are often encouraged to ignore baby's signals. Bad idea. Babies are “half-baked” at birth and have much to learn with the help of physical and emotional support from caregivers. Taking care of a baby's needs is an investment that pays off with a happier, healthier child and adult.  It can take weeks or even months for your body to feel totally normal again. There is a major hormonal upheaval experienced by moms in the first few weeks after birth, because their bodies are shifting from many pregnancy hormones to a more normal hormonal make-up.  These first three months with baby earthside is a delicate time. Honor it with a mindful dose of support. Mindful Mamma's is an shamika that may help.   Self-Compassion through Relational Support and Interaction.   Be kind to ourself. This is the first step in reducing anxiety and depression. Pay attention to how you are doing.   What do you need? Food, Rest, Sleep, Support, to Laugh/giggle: who will you ask today? They want to help you. We want to help you.   How do you stop your self-blame, or your  "self-criticism?   Get out of the house each day, walk or drive somewhere or ask a friend to drive you,  a \"treat\" at a drive through.   Mood and Anxiety Disorders: Having a new baby can be joyful time for some new moms, but a difficult time for others. For all, it is a time of profound physical and emotional change. Balancing baby, family, partners and friends, work, pets, and preexisting or new life stressors as well as sleep deprivation can be extremely challenging. Untreated depression, sleep exhaustion, and anxiety are each a challenge that must be addressed and in addition can contribute to early cessation of breastfeeding. It is important both for the mental health and physical health of new moms and babies to get on the path to feeling better and if therapeutic support is needed, specific resources are available, please ask. Referred to [facility] for support  Why is Breastfeeding so Challenging? AAP and Infant Feeding Guidelines recognizes the barriers and challenges that exist, society and healthcare often miss them.  It can be devastating when someone like Brooke  who is highly motivated to exclusively nurse but  cannot. When a (parent) discovers (they) can’t do what others seem to do so naturally, it is a genuine loss...that deserves to be acknowledged and mourned    Intuition Mom is MOST intuitive, reading All well and meeting his needs. She is limited to getting out but now feeling better after C/Section can attend group, go to the store and feel less isolated. Talking with other moms is healing and beneficial for new moms.    Sleep or Lack of: discussed strategies to manage restorative sleep, although short amounts, significant to the mental health of the mother. The principle follows:  Mom goes to sleep right after 8pm +/- feeding  Partner covers first late evening feeding (10pm/11pm), settles baby,  then goes to bed  Mom covers next feeding 1am /2am, partner sleeps  Next " feeding share  Milk Supply is dependent on glandular tissue development, hormonal influences, how many times the baby removes milk and how well the breasts are emptied in a 24 hour period. This is a biological reality that we can NOT work around. If, for any reason, your baby is not latching, or you are not able to nurse, then it is important for you to remove the milk instead by pumping or hand expression.  There's no magic trick, tea, food, drink, cookie or supplement that will increase your milk supply. One  must  effectively remove milk to continue to make and maximize milk. In the early days and weeks that can be 8+ times in 24 hours. For older babies, on average 6-7 + times in 24 hours.    Hydration: Staying hydrated is important however lack of hydration is usually not a cause of significantly low milk production.  Everyone needs a different amount of water, depending on their activity and diet. A high salt and/or high-protein diet, high physical activity, or very warm weather/sweating will require more fluids. A person eating a diet high in veggies and fruit, with a lack of physical activity will require fewer fluids. There is no magic number for the # of ounces of water each day.The best way to know that you are well hydrated is by looking at your urine.  Urine should look clear to light pale yellow, and you should need to pee at least every 3 to 4 hours unless you have a large bladder capacity.  Low Milk Supply: [place] births 3.6million, 5-15% chronic insufficient production, 180,000- 500,000 individuals - not as rare as we think (Darek et al, 2021). Your common factors:  Milk never came in the first 7-10 days with normal breast tissue:   Lack of nipple/breast stimulation (Baby at the breast but not suckling, mostly non nutritive sucking)  Lack of breast emptying (Baby at the breast but no removing much milk)  Maternal surgery  Herbs and Medications  A galactagogue is an herb or medication taken by a  breastfeeding mother to increase her milk supply. We know that for centuries mothers around the world have sought out remedies to increase their milk supply. However, there is limited research on the safety and effectiveness of herbal galactagogues, which makes it hard for us to endorse them. It is not known if any of these herbs are truly effective, and it is difficult to predict how a specific herbal galactagogue will affect an individual, requiring “trial and error” in many situations. When effective, results are generally seen within 24-72 hours of starting an herbal galactagogue. Many of these herbs are used to decrease high blood sugar. If you are diabetic or have problems with low blood sugar, or thyroid disease you may not be a candidate for herbs. Not all women can increase their low milk supply with a galactagogue due to the many underlying causes of low milk production.  When taking a galactagogue, remember that frequent milk removal is still the most effective way to increase supply.   Reduction Surgery Reduction surgery or the reason for the reduction surgery that was medically indicated 12 years ago is most like the  reason for the low supply. Mom has pumped and had baby at the breast that other indicators for supply would have allowed her to make more milk per 24 hours even if not a full supply. Making less than 5 ml for 24 hours indicates the cause is most likely surgery in the past.  Discussed if ever another baby, prenatal strategies are indicated.   Feeding:   Infant feeding well given current interval growth, guidelines to follow:  Feed your baby every 2-3 hours, more often if baby acts hungry.   Awaken baby for feeding if going over 3 hours in the day.   Daily goal: 8-12 feedings per 24 hours.    Given infants weight you may allow baby to go longer at night but that generally means shorter durations in the day.  Supplement:   Supplement with formula   Proper powder formula preparation:  https://www.cdc.gov/nutrition/downloads/prepare-store-powered-yfyxav-bzjusrs-353.pdf.   For babies under 2 months: https://www.cdc.gov/cronobacter/infection-and-infants.html  Pacifier Use:  The American Academy of Pediatrics' Position Paper reports: Although we recommend a conservative approach regarding pacifier use, we do not endorse a complete ban on the use of pacifiers, nor do we support an approach that induces parental guilt concerning their choices about the use of pacifiers. Pacifier use and breastfeeding in term and  newborns- a systematic review and meta-analysis from the  J of Pediatrics Published online 2022. Has found that when pacifiers are used among individuals who have been counseled on the risks, do not interfere with breastfeeding exclusivity or duration. These are parental choices.  Infant patterns.In the first 2-3 weeks babies primarily eat and sleep, but as they get to 3-5 weeks, the want to eat, play, look at the parent, discover the world and then as quickly want to fall asleep but reflexively they need to eat then sleep as they did the first 2-3 weeks. It is typical then for babies to eat, play, eat sleep. The second eat is for sucking, they may be happy with a pacifier, a quick bottle drink or for All, going to the breast and sucking to sleep. His weight does not warrant a feeding but his infancy warrants sucking.   Positioning Techniques for Bare Breast  Pillow used: Sharla 2 Nu Tacos  Suggested positions Cross cradle  Fine tune position by making sure your fingers beneath the breast as well as your bra, are out of the way of your baby's chin.  Positioning: See http://globalhealthmedia.org/portfolio-items/positions-for-breastfeeding/?suhojxpqyHM=98884  Latch on Techniques for Bare Breast.   Aim is an asymmetric deep latch.  First make yourself comfortable. Sit with knees bent (unless lying down), feet on a stool if needed. You will support your baby, and pillows  "will be used to support YOU. You want your baby's belly facing your breast/body (belly to belly). If your baby is extremely fussy and crying, do skin-to-skin for a while until he or she calms down, then try (or try again).   Fine tune latch:  By holding your baby more securely at the breast, assisting your baby to stay attached by:  Support your baby with your hand behind the shoulder blades (NOT THE HEAD).  1. Align your baby's NOSE with your nipple  2. Your baby's chin should be the first part of his or her body to touch your breast  3. Tickle the baby's upper lip or nose with your nipple  4. When your baby's mouth is WIDE OPEN, swiftly bring your baby's mouth over your nipple/areola.  Steps 2 and 3 could be slightly reversed order or essentially happening at the same time.  Bringing your baby to your breast, not breast to the baby  Your baby's cheek to touch breast securely, nose tipped back  Hold your baby firmly in place so when your baby forgets to suck and picks it back up again your baby is in the correct spot. You will be extinguishing behavior and replacing it with a deeper latch to stimulate suck and provide satisfaction at the breast.  Your baby needs as much breast as deep in the mouth as possible to allow your nipples to heal and for you more importantly to maximize efficiency at the breast  If that doesn’t help, you should make an appointment with me to re-evaluate.   Latch is asymmetrical, leading with the chin, getting the baby below the breast, as if offering a large \"deli cathi sandwich\".  Here is a video from DAHIANA on the asymmetric latch- https://www.youCloud Your Carube.com/watch?v=0I-WPs3Kc82  Pumping not indicated if baby at breast  Increasing supply besides Galactogogues and Pumping:  Warmth  Relaxation   Physical, auditory narratives  Childbirth relaxation Techniques  Acupuncture and acupressure  [facility] [email or web address]   Perez Wyatt " [email or web address]    Answers to FAQs: https://www.infantrisk.com/category/breastfeeding  Alcohol & Breastfeeding: What's your time-to-zero?  Cough & Cold Medications while Breastfeeding  Vitamin D Supplementation and Breastfeeding  Antidepressant Use While Breastfeeding: What should I know?  Breastfeeding, Caffeine, and Energy Drinks    Recommended Websites or books:  https://physicianguidetobreastfeeding.org/  Isabella for grandmas Tiny Medrano  [email or web address]  for breast reduction and breastfeeding     Connect with other mothers:  Breastfeeding [facility]  WEIGHT CHECKS  Tuesdays 10am - 11am. [facility] at [address] [place] [address], [address] [place], 3rd floor conference room  Check your baby's weight, do a feeding and see how your baby is growing, visit with other mothers, plan on a walk or coffee date after group.  Please download Growth: Baby and Child for Apple or Child Growth Tracker for [employer] to chart and follow your baby's growth curve.  Please wear a mask coming and going: you may remove it in the classroom  Due to space limitations - limit strollers please (New c/section moms please use your stroller).  We would love to have dads stay, but moms won't breastfeed if there are men in the room, sorry.  The room is generally scheduled for another event following group.  Please take all diapers with you   ONLINE SUPPORT GROUPS  Postpartum Support International (PSI) support groups are conducted using a eiok-jm-ymnt support model and are not intended for those experiencing a mental health crisis. Groups are 90 minutes (1.5 hours) in length. The first ~30 minutes is providing information, education, and establishing group guidelines. The next ~60 minutes is “talk time,” in which group members share and talk with each other. Group members must be present for the group guidelines before joining in the discussion or “talk time.”     In Conclusion:   Managing breastfeeding and milk supply is very dynamic,can  be a complex and intimate journey, and is not one size fits all. When obstacles present themselves, it takes confidence, persistence and support. The rights of the child include optimal nutrition and mothers need help to make informed decisions. You  and your baby have been screened for biological, psychological, and social risk factors that might interfere with achieving your goals.  Support is critical. You are now the focus of our Breastfeeding Medicine team; we are here to support your decisions and vision.     Follow up   Follow-up for infant weight check and dyad breastfeeding evaluation:  as needed for work on latch, support in this postpartum time or weight and feeding evaluation   Please call [phone] our voicemail line or the front office to scheduled your next appointment.  Family is encouraged to e-mail or mychart us to update how the plan is working.    A total of 90 minutes, not including infant assessment time, with more than 50% was spent preparing to see the patient, obtaining and reviewing separately obtained history, performing a medically appropriate examination and evaluation, counseling and educating the family,  documenting clinical information in the electronic health record, independently interpreting weighted feeds and infant growth results, communicating these results to the family and care coordination as detailed in the above note.       LARS Stiles.

## 2023-01-24 ENCOUNTER — OFFICE VISIT (OUTPATIENT)
Dept: MEDICAL GROUP | Facility: PHYSICIAN GROUP | Age: 38
End: 2023-01-24
Payer: COMMERCIAL

## 2023-01-24 VITALS
OXYGEN SATURATION: 97 % | WEIGHT: 167 LBS | SYSTOLIC BLOOD PRESSURE: 118 MMHG | DIASTOLIC BLOOD PRESSURE: 70 MMHG | BODY MASS INDEX: 27.82 KG/M2 | RESPIRATION RATE: 16 BRPM | HEIGHT: 65 IN | TEMPERATURE: 97.7 F | HEART RATE: 98 BPM

## 2023-01-24 DIAGNOSIS — Z13.6 SCREENING FOR CARDIOVASCULAR CONDITION: ICD-10-CM

## 2023-01-24 PROCEDURE — 96127 BRIEF EMOTIONAL/BEHAV ASSMT: CPT | Performed by: PHYSICIAN ASSISTANT

## 2023-01-24 PROCEDURE — 99204 OFFICE O/P NEW MOD 45 MIN: CPT | Mod: 25 | Performed by: PHYSICIAN ASSISTANT

## 2023-01-24 RX ORDER — SERTRALINE HYDROCHLORIDE 25 MG/1
25 TABLET, FILM COATED ORAL DAILY
COMMUNITY
Start: 2023-01-17 | End: 2023-02-09

## 2023-01-24 ASSESSMENT — ENCOUNTER SYMPTOMS
ABDOMINAL PAIN: 0
DEPRESSION: 1
WEAKNESS: 0
SORE THROAT: 0
DIZZINESS: 0
COUGH: 0
MYALGIAS: 0
CHILLS: 0
NERVOUS/ANXIOUS: 1
ORTHOPNEA: 0
FALLS: 0
HEADACHES: 0
BLURRED VISION: 0
NAUSEA: 0
FEVER: 0
HALLUCINATIONS: 0
VOMITING: 0
PALPITATIONS: 0
SHORTNESS OF BREATH: 0
DIAPHORESIS: 0
DIARRHEA: 0
BRUISES/BLEEDS EASILY: 0
WEIGHT LOSS: 0

## 2023-01-24 ASSESSMENT — FIBROSIS 4 INDEX: FIB4 SCORE: 0.95

## 2023-01-24 ASSESSMENT — PATIENT HEALTH QUESTIONNAIRE - PHQ9
CLINICAL INTERPRETATION OF PHQ2 SCORE: 5
5. POOR APPETITE OR OVEREATING: 2 - MORE THAN HALF THE DAYS
SUM OF ALL RESPONSES TO PHQ QUESTIONS 1-9: 18

## 2023-01-24 ASSESSMENT — LIFESTYLE VARIABLES: SUBSTANCE_ABUSE: 0

## 2023-01-24 NOTE — LETTER
January 24, 2023    To Whom It May Concern:         This is confirmation that Brooke Rogersmargotchan Hector attended her scheduled appointment with Mana Richmond P.A.-C. on 1/24/23. Please excuse her from work for the next 2 weeks. She will have a follow up appointment with me to fill out [facility] paperwork at that time.         If you have any questions please do not hesitate to call me at the phone number listed below.    Sincerely,          Mana Richmond P.A.-C.  [phone]

## 2023-01-24 NOTE — PROGRESS NOTES
"Subjective:     CC: establish as a new patient    HPI:   Brooke presents today with the following:    Problem   Post Partum Depression    Acute, stable.  Long labor, didn't progress.  Had to do a .  OB/GYN started her on zoloft 25mg daily (started 2023).  Finds when the baby cries, she cries.  Hands shake when  feeds the baby.  Was crying when he was circumcised - couldn't stop.  \"I have this anxiety inside of me\"  Had COVID during pregnancy.  Lost her parents when she was a kid.  Had her first therapy session online yesterday.  Will make another therapy appointment.         Depression Screening    Little interest or pleasure in doing things?  2 - more than half the days   Feeling down, depressed , or hopeless? 3 - nearly every day   Trouble falling or staying asleep, or sleeping too much?  2 - more than half the days   Feeling tired or having little energy?  3 - nearly every day   Poor appetite or overeating?  2 - more than half the days   Feeling bad about yourself - or that you are a failure or have let yourself or your family down? 2 - more than half the days   Trouble concentrating on things, such as reading the newspaper or watching television? 3 - nearly every day   Moving or speaking so slowly that other people could have noticed.  Or the opposite - being so fidgety or restless that you have been moving around a lot more than usual?  1 - several days   Thoughts that you would be better off dead, or of hurting yourself?  0 - not at all   Patient Health Questionnaire Score: 18       If depressive symptoms identified deferred to follow up visit unless specifically addressed in assesment and plan.    Interpretation of PHQ-9 Total Score   Score Severity   1-4 No Depression   5-9 Mild Depression   10-14 Moderate Depression   15-19 Moderately Severe Depression   20-27 Severe Depression      Health Maintenance: Not fully addressed due to acute issue    ROS:  Review of Systems   Constitutional:  " "Negative for chills, diaphoresis, fever, malaise/fatigue and weight loss.   HENT:  Negative for hearing loss and sore throat.    Eyes:  Negative for blurred vision.   Respiratory:  Negative for cough and shortness of breath.    Cardiovascular:  Negative for chest pain, palpitations, orthopnea and leg swelling.   Gastrointestinal:  Negative for abdominal pain, diarrhea, nausea and vomiting.   Genitourinary:  Negative for dysuria, frequency, hematuria and urgency.   Musculoskeletal:  Negative for falls, joint pain and myalgias.   Skin:  Negative for rash.   Neurological:  Negative for dizziness, weakness and headaches.   Endo/Heme/Allergies:  Does not bruise/bleed easily.   Psychiatric/Behavioral:  Positive for depression. Negative for hallucinations, substance abuse and suicidal ideas. The patient is nervous/anxious.      Objective:     Exam:  /70 (BP Location: Left arm, Patient Position: Sitting, BP Cuff Size: Large adult)   Pulse 98   Temp 36.5 °C (97.7 °F) (Temporal)   Resp 16   Ht 1.651 m (5' 5\")   Wt 75.8 kg (167 lb)   LMP 02/10/2022   SpO2 97%   Breastfeeding Yes Comment: Trying to breastfeed  BMI 27.79 kg/m²  Body mass index is 27.79 kg/m².    Physical Exam  Constitutional:       Appearance: Normal appearance.   Eyes:      Conjunctiva/sclera: Conjunctivae normal.   Cardiovascular:      Rate and Rhythm: Normal rate and regular rhythm.      Heart sounds: Normal heart sounds.   Pulmonary:      Effort: Pulmonary effort is normal.      Breath sounds: Normal breath sounds.   Abdominal:      General: Abdomen is flat. There is no distension.      Palpations: Abdomen is soft. There is no mass.      Tenderness: There is no abdominal tenderness.   Musculoskeletal:      Cervical back: Normal range of motion and neck supple.   Skin:     General: Skin is warm.   Neurological:      General: No focal deficit present.      Mental Status: She is alert.   Psychiatric:         Mood and Affect: Mood normal. "       Assessment & Plan:     37 y.o. female with the following -     1. Post partum depression  Acute, uncontrolled.  Starting sertraline 25 mg daily today.  Follow-up in 2 weeks for reevaluation and FMLA paperwork.  Follow-up sooner or go to the ER for any acute worsening of symptoms.    - CBC WITH DIFFERENTIAL; Future  - Comp Metabolic Panel; Future  - TSH WITH REFLEX TO FT4; Future  - VITAMIN D,25 HYDROXY (DEFICIENCY); Future    2. Screening for cardiovascular condition    - Lipid Profile; Future    3. BMI 27.0-27.9,adult  Chronic, stable.  Checking labs.    - CBC WITH DIFFERENTIAL; Future  - Comp Metabolic Panel; Future  - TSH WITH REFLEX TO FT4; Future      Return in about 2 weeks (around 2/7/2023) for FMLA paperwork, depression, labs.    Please note that this dictation was created using voice recognition software. I have made every reasonable attempt to correct obvious errors, but I expect that there are errors of grammar and possibly content that I did not discover before finalizing the note.

## 2023-02-01 ENCOUNTER — TELEMEDICINE (OUTPATIENT)
Dept: MEDICAL GROUP | Facility: PHYSICIAN GROUP | Age: 38
End: 2023-02-01
Payer: COMMERCIAL

## 2023-02-01 VITALS — RESPIRATION RATE: 18 BRPM | WEIGHT: 169 LBS | HEIGHT: 65 IN | BODY MASS INDEX: 28.16 KG/M2

## 2023-02-01 DIAGNOSIS — Z02.89 ENCOUNTER FOR COMPLETION OF FORM WITH PATIENT: ICD-10-CM

## 2023-02-01 PROCEDURE — 99214 OFFICE O/P EST MOD 30 MIN: CPT | Mod: 95 | Performed by: PHYSICIAN ASSISTANT

## 2023-02-01 ASSESSMENT — FIBROSIS 4 INDEX: FIB4 SCORE: 0.95

## 2023-02-01 NOTE — PROGRESS NOTES
"Virtual Visit: Established Patient   This visit was conducted via Zoom using secure and encrypted videoconferencing technology.   The patient was in their home in the [facility].    The patient's identity was confirmed and verbal consent was obtained for this virtual visit.     Subjective:   CC:   Chief Complaint   Patient presents with    Paperwork     FMLA       Brooke Young is a 37 y.o. female presenting for evaluation and management of:    Postpartum depression  Acute, uncontrolled.  Long labor, didn't progress.  Had to do a .  OB/GYN started her on zoloft 25mg daily (started 2023).  Finds when the baby cries, she cries.  Hands shake when  feeds the baby.  Was crying when he was circumcised - couldn't stop.  \"I have this anxiety inside of me\"  Had COVID during pregnancy.  Lost her parents when she was a kid.  Had her first therapy session online yesterday.  Will make another therapy appointment.    2023:  Patient started sertraline 2023.  She has not noticed any significant difference.  Patient still feels very overwhelmed with everything, care of her home, grocery shopping, the thought of going to work.  She is having difficulty concentrating/focusing.  Patient denies any SI/HI, specifically denies any thoughts of hurting her baby.  We will continue patient on 25 mg for the next 2-3 weeks, increasing to 50 if she feels like she has not noticed any change.  Patient to follow-up 3/1/2023, sooner as needed.  [facility] paperwork filled out in clinic today.    ROS   Denies nausea, vomiting, diarrhea, abdominal pain    Current medicines (including changes today)  Current Outpatient Medications   Medication Sig Dispense Refill    sertraline (ZOLOFT) 25 MG tablet Take 25 mg by mouth every day.      Ferrous Sulfate (IRON PO) Take 1 Tablet by mouth.       No current facility-administered medications for this visit.       Patient Active Problem List    Diagnosis Date Noted    " "Post partum depression 01/24/2023    Labor and delivery indication for care or intervention 11/25/2022    Post-dates pregnancy 11/25/2022    Arrest of dilation, delivered, current hospitalization 11/25/2022    Snoring 09/27/2021    Pain in both feet 09/27/2021    Melasma 09/27/2021        Objective:   Resp 18   Ht 1.651 m (5' 5\")   Wt 76.7 kg (169 lb)   LMP 02/10/2022   Breastfeeding No   BMI 28.12 kg/m²     Physical Exam:    Constitutional: Alert, no distress, well-groomed.  Skin: No rashes in visible areas.  Eye: Round. Conjunctiva clear, lids normal. No icterus.   ENMT: Lips pink without lesions, good dentition, moist mucous membranes. Phonation normal.  Neck: No masses, no thyromegaly. Moves freely without pain.  Respiratory: Unlabored respiratory effort, no cough or audible wheeze  Psych: Alert and oriented x3, normal affect and mood.     Assessment and Plan:   The following treatment plan was discussed:     1. Post partum depression    2. Encounter for completion of form with patient  Acute, uncontrolled.  [place] paperwork filled out in clinic today.  Do not feel patient is able to go back to work at this time.  Patient will follow-up in 1 month for reevaluation, sooner for any significant changes.  Continue sertraline 25 mg daily, increasing to 50 mg daily over the next 2-3 weeks if she feels like the 25 mg is not helping.    Follow-up: Return in about 1 month (around 3/1/2023).         "

## 2023-02-08 ENCOUNTER — HOSPITAL ENCOUNTER (OUTPATIENT)
Dept: LAB | Facility: MEDICAL CENTER | Age: 38
End: 2023-02-08
Attending: PHYSICIAN ASSISTANT
Payer: COMMERCIAL

## 2023-02-08 DIAGNOSIS — Z13.6 SCREENING FOR CARDIOVASCULAR CONDITION: ICD-10-CM

## 2023-02-08 LAB
25(OH)D3 SERPL-MCNC: 15 NG/ML (ref 30–100)
ALBUMIN SERPL BCP-MCNC: 4.5 G/DL (ref 3.2–4.9)
ALBUMIN/GLOB SERPL: 1.7 G/DL
ALP SERPL-CCNC: 67 U/L (ref 30–99)
ALT SERPL-CCNC: 24 U/L (ref 2–50)
ANION GAP SERPL CALC-SCNC: 10 MMOL/L (ref 7–16)
AST SERPL-CCNC: 22 U/L (ref 12–45)
BASOPHILS # BLD AUTO: 0.5 % (ref 0–1.8)
BASOPHILS # BLD: 0.04 K/UL (ref 0–0.12)
BILIRUB SERPL-MCNC: 0.6 MG/DL (ref 0.1–1.5)
BUN SERPL-MCNC: 16 MG/DL (ref 8–22)
CALCIUM ALBUM COR SERPL-MCNC: 9.2 MG/DL (ref 8.5–10.5)
CALCIUM SERPL-MCNC: 9.6 MG/DL (ref 8.5–10.5)
CHLORIDE SERPL-SCNC: 104 MMOL/L (ref 96–112)
CHOLEST SERPL-MCNC: 179 MG/DL (ref 100–199)
CO2 SERPL-SCNC: 24 MMOL/L (ref 20–33)
CREAT SERPL-MCNC: 0.64 MG/DL (ref 0.5–1.4)
EOSINOPHIL # BLD AUTO: 0.08 K/UL (ref 0–0.51)
EOSINOPHIL NFR BLD: 1.1 % (ref 0–6.9)
ERYTHROCYTE [DISTWIDTH] IN BLOOD BY AUTOMATED COUNT: 43.6 FL (ref 35.9–50)
GFR SERPLBLD CREATININE-BSD FMLA CKD-EPI: 116 ML/MIN/1.73 M 2
GLOBULIN SER CALC-MCNC: 2.7 G/DL (ref 1.9–3.5)
GLUCOSE SERPL-MCNC: 97 MG/DL (ref 65–99)
HCT VFR BLD AUTO: 39.9 % (ref 37–47)
HDLC SERPL-MCNC: 47 MG/DL
HGB BLD-MCNC: 13.1 G/DL (ref 12–16)
IMM GRANULOCYTES # BLD AUTO: 0.03 K/UL (ref 0–0.11)
IMM GRANULOCYTES NFR BLD AUTO: 0.4 % (ref 0–0.9)
LDLC SERPL CALC-MCNC: 105 MG/DL
LYMPHOCYTES # BLD AUTO: 1.61 K/UL (ref 1–4.8)
LYMPHOCYTES NFR BLD: 21.4 % (ref 22–41)
MCH RBC QN AUTO: 27.7 PG (ref 27–33)
MCHC RBC AUTO-ENTMCNC: 32.8 G/DL (ref 33.6–35)
MCV RBC AUTO: 84.4 FL (ref 81.4–97.8)
MONOCYTES # BLD AUTO: 0.71 K/UL (ref 0–0.85)
MONOCYTES NFR BLD AUTO: 9.4 % (ref 0–13.4)
NEUTROPHILS # BLD AUTO: 5.05 K/UL (ref 2–7.15)
NEUTROPHILS NFR BLD: 67.2 % (ref 44–72)
NRBC # BLD AUTO: 0 K/UL
NRBC BLD-RTO: 0 /100 WBC
PLATELET # BLD AUTO: 314 K/UL (ref 164–446)
PMV BLD AUTO: 11.1 FL (ref 9–12.9)
POTASSIUM SERPL-SCNC: 3.7 MMOL/L (ref 3.6–5.5)
PROT SERPL-MCNC: 7.2 G/DL (ref 6–8.2)
RBC # BLD AUTO: 4.73 M/UL (ref 4.2–5.4)
SODIUM SERPL-SCNC: 138 MMOL/L (ref 135–145)
TRIGL SERPL-MCNC: 136 MG/DL (ref 0–149)
TSH SERPL DL<=0.005 MIU/L-ACNC: 1.07 UIU/ML (ref 0.38–5.33)
WBC # BLD AUTO: 7.5 K/UL (ref 4.8–10.8)

## 2023-02-08 PROCEDURE — 84443 ASSAY THYROID STIM HORMONE: CPT

## 2023-02-08 PROCEDURE — 85025 COMPLETE CBC W/AUTO DIFF WBC: CPT

## 2023-02-08 PROCEDURE — 80061 LIPID PANEL: CPT

## 2023-02-08 PROCEDURE — 82306 VITAMIN D 25 HYDROXY: CPT

## 2023-02-08 PROCEDURE — 80053 COMPREHEN METABOLIC PANEL: CPT

## 2023-02-08 PROCEDURE — 36415 COLL VENOUS BLD VENIPUNCTURE: CPT

## 2023-02-09 ENCOUNTER — TELEMEDICINE (OUTPATIENT)
Dept: MEDICAL GROUP | Facility: PHYSICIAN GROUP | Age: 38
End: 2023-02-09
Payer: COMMERCIAL

## 2023-02-09 VITALS — BODY MASS INDEX: 27.55 KG/M2 | HEIGHT: 65 IN | WEIGHT: 165.34 LBS | RESPIRATION RATE: 16 BRPM

## 2023-02-09 DIAGNOSIS — E55.9 VITAMIN D DEFICIENCY: ICD-10-CM

## 2023-02-09 PROBLEM — E78.5 DYSLIPIDEMIA: Status: ACTIVE | Noted: 2023-02-09

## 2023-02-09 PROCEDURE — 96127 BRIEF EMOTIONAL/BEHAV ASSMT: CPT | Mod: 95 | Performed by: PHYSICIAN ASSISTANT

## 2023-02-09 PROCEDURE — 99214 OFFICE O/P EST MOD 30 MIN: CPT | Mod: 95 | Performed by: PHYSICIAN ASSISTANT

## 2023-02-09 RX ORDER — ERGOCALCIFEROL 1.25 MG/1
50000 CAPSULE ORAL
Qty: 12 CAPSULE | Refills: 3 | Status: SHIPPED | OUTPATIENT
Start: 2023-02-09 | End: 2023-02-23 | Stop reason: SDUPTHER

## 2023-02-09 ASSESSMENT — FIBROSIS 4 INDEX: FIB4 SCORE: 0.53

## 2023-02-09 ASSESSMENT — PATIENT HEALTH QUESTIONNAIRE - PHQ9
CLINICAL INTERPRETATION OF PHQ2 SCORE: 2
5. POOR APPETITE OR OVEREATING: 2 - MORE THAN HALF THE DAYS
SUM OF ALL RESPONSES TO PHQ QUESTIONS 1-9: 15

## 2023-02-09 NOTE — PROGRESS NOTES
"Virtual Visit: Established Patient   This visit was conducted via Zoom using secure and encrypted videoconferencing technology.   The patient was in their home in the [facility].    The patient's identity was confirmed and verbal consent was obtained for this virtual visit.     Subjective:   CC:   Chief Complaint   Patient presents with    Lab Results    Depression       Brooke Young is a 37 y.o. female presenting for evaluation and management of:    Post partum Depression  Acute, uncontrolled.  Long labor, didn't progress.  Had to do a .  OB/GYN started her on zoloft 25mg daily (started 2023).  Finds when the baby cries, she cries.  Hands shake when  feeds the baby.  Was crying when he was circumcised - couldn't stop.  \"I have this anxiety inside of me\"  Had COVID during pregnancy.  Lost her parents when she was a kid.  Had her first therapy session online yesterday.  Will make another therapy appointment.    2023:  Patient started sertraline 2023.  She has not noticed any significant difference.  Patient still feels very overwhelmed with everything, care of her home, grocery shopping, the thought of going to work.  She is having difficulty concentrating/focusing.  Patient denies any SI/HI, specifically denies any thoughts of hurting her baby.  We will continue patient on 25 mg for the next 2-3 weeks, increasing to 50 if she feels like she has not noticed any change.  Patient to follow-up 3/1/2023, sooner as needed.  [place] paperwork filled out in clinic today.    2023:  PHQ-9 today: 15  PHQ-9 2023:18  Has a rough few days due to lack of sleep (son didn't sleep well and was very fussy).  Doesn't feel worse but doesn't feel better.   Has been on sertraline 25mg    Depression Screening    Little interest or pleasure in doing things?  1 - several days   Feeling down, depressed , or hopeless? 1 - several days   Trouble falling or staying asleep, or sleeping too " much?  3 - nearly every day   Feeling tired or having little energy?  3 - nearly every day   Poor appetite or overeating?  2 - more than half the days   Feeling bad about yourself - or that you are a failure or have let yourself or your family down? 2 - more than half the days   Trouble concentrating on things, such as reading the newspaper or watching television? 3 - nearly every day   Moving or speaking so slowly that other people could have noticed.  Or the opposite - being so fidgety or restless that you have been moving around a lot more than usual?  0 - not at all   Thoughts that you would be better off dead, or of hurting yourself?  0 - not at all   Patient Health Questionnaire Score: 15       If depressive symptoms identified deferred to follow up visit unless specifically addressed in assesment and plan.    Interpretation of PHQ-9 Total Score   Score Severity   1-4 No Depression   5-9 Mild Depression   10-14 Moderate Depression   15-19 Moderately Severe Depression   20-27 Severe Depression      ROS   Negative for fever, chills, body aches, shortness of breath, nausea, vomiting, diarrhea    Current medicines (including changes today)  Current Outpatient Medications   Medication Sig Dispense Refill    sertraline (ZOLOFT) 50 MG Tab Take 1 Tablet by mouth every day. 90 Tablet 0    vitamin D2, Ergocalciferol, (DRISDOL) 1.25 MG (96788 UT) Cap capsule Take 1 Capsule by mouth every 7 days. 12 Capsule 3    Ferrous Sulfate (IRON PO) Take 1 Tablet by mouth.       No current facility-administered medications for this visit.       Patient Active Problem List    Diagnosis Date Noted    Dyslipidemia 02/09/2023    Vitamin D deficiency 02/09/2023    Post partum depression 01/24/2023    Labor and delivery indication for care or intervention 11/25/2022    Post-dates pregnancy 11/25/2022    Arrest of dilation, delivered, current hospitalization 11/25/2022    Snoring 09/27/2021    Pain in both feet 09/27/2021    Melasma  "09/27/2021        Objective:   Resp 16   Ht 1.651 m (5' 5\") Comment: per patient  Wt 75 kg (165 lb 5.5 oz) Comment: per patient  LMP 02/10/2022   Breastfeeding Yes Comment: Trying to but not enough milk  BMI 27.51 kg/m²     Physical Exam:  Constitutional: Alert, no distress, well-groomed.  Skin: No rashes in visible areas.  Eye: Round. Conjunctiva clear, lids normal. No icterus.   ENMT: Lips pink without lesions, good dentition, moist mucous membranes. Phonation normal.  Neck: No masses, no thyromegaly. Moves freely without pain.  Respiratory: Unlabored respiratory effort, no cough or audible wheeze  Psych: Alert and oriented x3, normal affect and mood.     Assessment and Plan:   The following treatment plan was discussed:     1. Post partum depression  Chronic, uncontrolled.  Increasing sertraline to 50 mg daily, from 25 mg daily.  Follow-up in 1 month for reevaluation, sooner as needed.    - sertraline (ZOLOFT) 50 MG Tab; Take 1 Tablet by mouth every day.  Dispense: 90 Tablet; Refill: 0    2. Vitamin D deficiency  Chronic, uncontrolled.  New diagnosis.  Starting supplementation, 50,000 IU weekly.    - vitamin D2, Ergocalciferol, (DRISDOL) 1.25 MG (64993 UT) Cap capsule; Take 1 Capsule by mouth every 7 days.  Dispense: 12 Capsule; Refill: 3      Follow-up: No follow-ups on file.         "

## 2023-02-23 DIAGNOSIS — E55.9 VITAMIN D DEFICIENCY: ICD-10-CM

## 2023-02-24 RX ORDER — ERGOCALCIFEROL 1.25 MG/1
50000 CAPSULE ORAL
Qty: 12 CAPSULE | Refills: 3 | Status: SHIPPED | OUTPATIENT
Start: 2023-02-24 | End: 2023-11-27

## 2023-02-24 NOTE — TELEPHONE ENCOUNTER
Received request via: Patient    Was the patient seen in the last year in this department? Yes    Does the patient have an active prescription (recently filled or refills available) for medication(s) requested? No    Does the patient have [facility] and need 100 day supply (blood pressure, diabetes and cholesterol meds only)? Patient does not have SCP      Patient comment: Just finished my 25 ml ( took 2 tables to make it 50). Would you please re-submit order? Pharmacy cannot find this prescription.

## 2023-03-13 ENCOUNTER — TELEMEDICINE (OUTPATIENT)
Dept: MEDICAL GROUP | Facility: PHYSICIAN GROUP | Age: 38
End: 2023-03-13
Payer: COMMERCIAL

## 2023-03-13 VITALS — BODY MASS INDEX: 27.55 KG/M2 | RESPIRATION RATE: 16 BRPM | WEIGHT: 165.34 LBS | HEIGHT: 65 IN

## 2023-03-13 PROCEDURE — 99213 OFFICE O/P EST LOW 20 MIN: CPT | Mod: 95 | Performed by: PHYSICIAN ASSISTANT

## 2023-03-13 ASSESSMENT — FIBROSIS 4 INDEX: FIB4 SCORE: 0.53

## 2023-03-13 NOTE — PROGRESS NOTES
"Virtual Visit: Established Patient   This visit was conducted via Zoom using secure and encrypted videoconferencing technology.   The patient was in their home in the [facility].    The patient's identity was confirmed and verbal consent was obtained for this virtual visit.     Subjective:   CC:   Chief Complaint   Patient presents with    Paperwork     Return to work       Brooke Young is a 37 y.o. female presenting for evaluation and management of:    Postpartum depression  Overall patient is feeling better.  She would like to go back to work without restriction.  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    ROS   Denies fever, chills, body aches    Current medicines (including changes today)  Current Outpatient Medications   Medication Sig Dispense Refill    sertraline (ZOLOFT) 50 MG Tab Take 1 Tablet by mouth every day. 90 Tablet 0    vitamin D2, Ergocalciferol, (DRISDOL) 1.25 MG (29395 UT) Cap capsule Take 1 Capsule by mouth every 7 days. 12 Capsule 3    Ferrous Sulfate (IRON PO) Take 1 Tablet by mouth.       No current facility-administered medications for this visit.       Patient Active Problem List    Diagnosis Date Noted    Dyslipidemia 02/09/2023    Vitamin D deficiency 02/09/2023    Post partum depression 01/24/2023    Labor and delivery indication for care or intervention 11/25/2022    Post-dates pregnancy 11/25/2022    Arrest of dilation, delivered, current hospitalization 11/25/2022    Snoring 09/27/2021    Pain in both feet 09/27/2021    Melasma 09/27/2021        Objective:   Resp 16   Ht 1.651 m (5' 5\") Comment: per patient  Wt 75 kg (165 lb 5.5 oz) Comment: per patient  LMP 03/11/2023 (Approximate)   Breastfeeding Yes Comment: Supplementing with formula  BMI 27.51 kg/m²     Physical Exam:    Constitutional: Alert, no distress, " well-groomed.  Skin: No rashes in visible areas.  Eye: Round. Conjunctiva clear, lids normal. No icterus.   ENMT: Lips pink without lesions, good dentition, moist mucous membranes. Phonation normal.  Neck: No masses, no thyromegaly. Moves freely without pain.  Respiratory: Unlabored respiratory effort, no cough or audible wheeze  Psych: Alert and oriented x3, normal affect and mood.     Assessment and Plan:   The following treatment plan was discussed:     1. Post partum depression  May return to work without restriction.  Paperwork filled out for the patient and faxed to her employer.    Follow-up: Return if symptoms worsen or fail to improve.

## 2023-04-06 ENCOUNTER — TELEPHONE (OUTPATIENT)
Dept: MEDICAL GROUP | Facility: PHYSICIAN GROUP | Age: 38
End: 2023-04-06
Payer: COMMERCIAL

## 2023-07-05 ENCOUNTER — TELEMEDICINE (OUTPATIENT)
Dept: MEDICAL GROUP | Facility: PHYSICIAN GROUP | Age: 38
End: 2023-07-05
Payer: COMMERCIAL

## 2023-07-05 VITALS — RESPIRATION RATE: 16 BRPM | BODY MASS INDEX: 29.38 KG/M2 | HEIGHT: 65 IN | WEIGHT: 176.37 LBS

## 2023-07-05 DIAGNOSIS — R14.0 ABDOMINAL BLOATING: ICD-10-CM

## 2023-07-05 DIAGNOSIS — E66.3 OVERWEIGHT: ICD-10-CM

## 2023-07-05 PROCEDURE — 99214 OFFICE O/P EST MOD 30 MIN: CPT | Mod: 95 | Performed by: PHYSICIAN ASSISTANT

## 2023-07-05 RX ORDER — TRETINOIN 0.5 MG/G
CREAM TOPICAL
COMMUNITY
Start: 2023-06-25 | End: 2023-11-27

## 2023-07-05 RX ORDER — HYDROQUINONE 40 MG/G
1 CREAM TOPICAL 2 TIMES DAILY
COMMUNITY
Start: 2023-06-25 | End: 2023-11-27

## 2023-07-05 ASSESSMENT — FIBROSIS 4 INDEX: FIB4 SCORE: 0.54

## 2023-07-05 NOTE — PROGRESS NOTES
Virtual Visit: Established Patient   This visit was conducted via Zoom using secure and encrypted videoconferencing technology.   The patient was in their home in the [facility].    The patient's identity was confirmed and verbal consent was obtained for this virtual visit.     Subjective:   CC:   Chief Complaint   Patient presents with    Weight Gain       Brooke Young is a 38 y.o. female presenting for evaluation and management of:    Overweight:  Chronic, uncontrolled.  Delivered 7 months ago (11/25/22).  Weight after delivery: around 75kg  Current weight: 80kg  Eating habits: eats when she has time; has a hard time scheduling her meals.  Appetite fluctuates; feels like her stomach isn't processing foods quickly enough.  Feels like she needs to eat but feels like her stomach is still full  Doesn't sleep enough.  Bowels: every other day; normal for her      ROS   Denies N/V/D    Current medicines (including changes today)  Current Outpatient Medications   Medication Sig Dispense Refill    fluocinolone (SYNALAR) 0.01 % Cream APPLY THIN LAYER TOPICALLY TO THE AFFECTED AREA OF FACE AT NIGHT FOR UP TO 8 WEEKS THEN STOP FOR 8 WEEKS      hydroquinone 4 % cream Apply 1 Application topically 2 times a day.      tretinoin (RETIN-A) 0.05 % cream       sertraline (ZOLOFT) 50 MG Tab TAKE 1 TABLET BY MOUTH EVERY DAY 90 Tablet 2    vitamin D2, Ergocalciferol, (DRISDOL) 1.25 MG (06570 UT) Cap capsule Take 1 Capsule by mouth every 7 days. (Patient not taking: Reported on 7/5/2023) 12 Capsule 3    Ferrous Sulfate (IRON PO) Take 1 Tablet by mouth. (Patient not taking: Reported on 7/5/2023)       No current facility-administered medications for this visit.       Patient Active Problem List    Diagnosis Date Noted    Overweight 07/05/2023    Dyslipidemia 02/09/2023    Vitamin D deficiency 02/09/2023    Post partum depression 01/24/2023    Labor and delivery indication for care or intervention 11/25/2022     "Post-dates pregnancy 11/25/2022    Arrest of dilation, delivered, current hospitalization 11/25/2022    Snoring 09/27/2021    Pain in both feet 09/27/2021    Melasma 09/27/2021        Objective:   Resp 16   Ht 1.651 m (5' 5\") Comment: per patient  Wt 80 kg (176 lb 5.9 oz) Comment: per patient  BMI 29.35 kg/m²     Physical Exam:  Constitutional: Alert, no distress, well-groomed.  Skin: No rashes in visible areas.  Eye: Round. Conjunctiva clear, lids normal. No icterus.   ENMT: Lips pink without lesions, good dentition, moist mucous membranes. Phonation normal.  Neck: No masses, no thyromegaly. Moves freely without pain.  Respiratory: Unlabored respiratory effort, no cough or audible wheeze  Psych: Alert and oriented x3, normal affect and mood.     Assessment and Plan:   The following treatment plan was discussed:     1. Overweight  Chronic, uncontrolled.  Discussed increasing plant-based foods, decreasing animal-based foods.  Increase daily activity, aiming for 30-45 minutes brisk exercise daily.  Referring dietitian.    - TSH WITH REFLEX TO FT4; Future  - VITAMIN B12; Future  - Nutrition (Dietary) Consult    2. Abdominal bloating  - T-TG IGA W/REFLEX; Future        Follow-up: Return if symptoms worsen or fail to improve.         "

## 2023-09-25 ENCOUNTER — HOSPITAL ENCOUNTER (EMERGENCY)
Facility: MEDICAL CENTER | Age: 38
End: 2023-09-25
Attending: EMERGENCY MEDICINE
Payer: COMMERCIAL

## 2023-09-25 VITALS
WEIGHT: 174.38 LBS | OXYGEN SATURATION: 95 % | RESPIRATION RATE: 15 BRPM | TEMPERATURE: 97.1 F | SYSTOLIC BLOOD PRESSURE: 117 MMHG | HEART RATE: 63 BPM | BODY MASS INDEX: 29.02 KG/M2 | DIASTOLIC BLOOD PRESSURE: 66 MMHG

## 2023-09-25 DIAGNOSIS — N93.8 DYSFUNCTIONAL UTERINE BLEEDING: ICD-10-CM

## 2023-09-25 DIAGNOSIS — N92.1 MENORRHAGIA WITH IRREGULAR CYCLE: ICD-10-CM

## 2023-09-25 LAB
ALBUMIN SERPL BCP-MCNC: 4.5 G/DL (ref 3.2–4.9)
ALBUMIN/GLOB SERPL: 1.6 G/DL
ALP SERPL-CCNC: 64 U/L (ref 30–99)
ALT SERPL-CCNC: 18 U/L (ref 2–50)
ANION GAP SERPL CALC-SCNC: 10 MMOL/L (ref 7–16)
AST SERPL-CCNC: 18 U/L (ref 12–45)
BASOPHILS # BLD AUTO: 0.4 % (ref 0–1.8)
BASOPHILS # BLD: 0.03 K/UL (ref 0–0.12)
BILIRUB SERPL-MCNC: 0.4 MG/DL (ref 0.1–1.5)
BUN SERPL-MCNC: 17 MG/DL (ref 8–22)
CALCIUM ALBUM COR SERPL-MCNC: 8.9 MG/DL (ref 8.5–10.5)
CALCIUM SERPL-MCNC: 9.3 MG/DL (ref 8.5–10.5)
CHLORIDE SERPL-SCNC: 104 MMOL/L (ref 96–112)
CO2 SERPL-SCNC: 25 MMOL/L (ref 20–33)
CREAT SERPL-MCNC: 0.71 MG/DL (ref 0.5–1.4)
D DIMER PPP IA.FEU-MCNC: <0.27 UG/ML (FEU) (ref 0–0.5)
EOSINOPHIL # BLD AUTO: 0.1 K/UL (ref 0–0.51)
EOSINOPHIL NFR BLD: 1.5 % (ref 0–6.9)
ERYTHROCYTE [DISTWIDTH] IN BLOOD BY AUTOMATED COUNT: 43.4 FL (ref 35.9–50)
GFR SERPLBLD CREATININE-BSD FMLA CKD-EPI: 111 ML/MIN/1.73 M 2
GLOBULIN SER CALC-MCNC: 2.8 G/DL (ref 1.9–3.5)
GLUCOSE SERPL-MCNC: 89 MG/DL (ref 65–99)
HCG SERPL QL: NEGATIVE
HCT VFR BLD AUTO: 39.1 % (ref 37–47)
HGB BLD-MCNC: 13.1 G/DL (ref 12–16)
IMM GRANULOCYTES # BLD AUTO: 0.02 K/UL (ref 0–0.11)
IMM GRANULOCYTES NFR BLD AUTO: 0.3 % (ref 0–0.9)
INR PPP: 1.01 (ref 0.87–1.13)
LIPASE SERPL-CCNC: 16 U/L (ref 11–82)
LYMPHOCYTES # BLD AUTO: 1.32 K/UL (ref 1–4.8)
LYMPHOCYTES NFR BLD: 19.3 % (ref 22–41)
MCH RBC QN AUTO: 29.5 PG (ref 27–33)
MCHC RBC AUTO-ENTMCNC: 33.5 G/DL (ref 32.2–35.5)
MCV RBC AUTO: 88.1 FL (ref 81.4–97.8)
MONOCYTES # BLD AUTO: 0.58 K/UL (ref 0–0.85)
MONOCYTES NFR BLD AUTO: 8.5 % (ref 0–13.4)
NEUTROPHILS # BLD AUTO: 4.8 K/UL (ref 1.82–7.42)
NEUTROPHILS NFR BLD: 70 % (ref 44–72)
NRBC # BLD AUTO: 0 K/UL
NRBC BLD-RTO: 0 /100 WBC (ref 0–0.2)
PLATELET # BLD AUTO: 264 K/UL (ref 164–446)
PMV BLD AUTO: 10.8 FL (ref 9–12.9)
POTASSIUM SERPL-SCNC: 4.3 MMOL/L (ref 3.6–5.5)
PROT SERPL-MCNC: 7.3 G/DL (ref 6–8.2)
PROTHROMBIN TIME: 13.4 SEC (ref 12–14.6)
RBC # BLD AUTO: 4.44 M/UL (ref 4.2–5.4)
SODIUM SERPL-SCNC: 139 MMOL/L (ref 135–145)
TSH SERPL DL<=0.005 MIU/L-ACNC: 1.7 UIU/ML (ref 0.38–5.33)
WBC # BLD AUTO: 6.9 K/UL (ref 4.8–10.8)

## 2023-09-25 PROCEDURE — 84703 CHORIONIC GONADOTROPIN ASSAY: CPT

## 2023-09-25 PROCEDURE — 84443 ASSAY THYROID STIM HORMONE: CPT

## 2023-09-25 PROCEDURE — 83690 ASSAY OF LIPASE: CPT

## 2023-09-25 PROCEDURE — 85379 FIBRIN DEGRADATION QUANT: CPT

## 2023-09-25 PROCEDURE — 99284 EMERGENCY DEPT VISIT MOD MDM: CPT

## 2023-09-25 PROCEDURE — 80053 COMPREHEN METABOLIC PANEL: CPT

## 2023-09-25 PROCEDURE — 85610 PROTHROMBIN TIME: CPT

## 2023-09-25 PROCEDURE — 85025 COMPLETE CBC W/AUTO DIFF WBC: CPT

## 2023-09-25 ASSESSMENT — FIBROSIS 4 INDEX: FIB4 SCORE: 0.54

## 2023-09-25 NOTE — ED PROVIDER NOTES
ED Provider Note    CHIEF COMPLAINT  Chief Complaint   Patient presents with    Vaginal Bleeding            HPI/ROS      Brooke Young is a 38 y.o. female who presents with chief complaint of vaginal bleeding.  Patient with vaginal bleeding for approximately last week.  Patient reports she is going through around 12 pads per day.  She reports she is passing some clots per vagina.  She denies any bleeding elsewhere.  She reports feeling weak.  Patient denies any fevers or chills.  Patient does not believe she is pregnant.  Patient gave birth to a healthy baby boy approximately 12 months ago.  Patient denies any associated use of anticoagulants or antiplatelets.  She denies any associated pelvic pain or abdominal pain.  She denies any dysuria urgency or frequency.  Of note an unrelated patient has some mild calf pain.  She reports it is her proximal left calf.  She reports it started spontaneously, she does not recall any trauma but does have to bend over constantly to  her young child and also has a relatively active job.    PAST MEDICAL HISTORY   has a past medical history of Dyslipidemia (2/9/2023) and Post partum depression (1/24/2023).    SURGICAL HISTORY   has a past surgical history that includes lumpectomy; septal reconstruction; other abdominal surgery (N/A, 2020); and primary c section (Bilateral, 11/25/2022).    FAMILY HISTORY  Family History   Problem Relation Age of Onset    Autoimmune Disease Mother         doesn't know specifics; somehow related to her liver       SOCIAL HISTORY  Social History     Tobacco Use    Smoking status: Never    Smokeless tobacco: Never   Vaping Use    Vaping Use: Never used   Substance and Sexual Activity    Alcohol use: Not Currently     Comment: soc    Drug use: Never    Sexual activity: Not on file       CURRENT MEDICATIONS  Home Medications       Reviewed by Mai Escobar R.N. (Registered Nurse) on 09/25/23 at 2353  Med List Status: Partial      Medication Last Dose Status   Ferrous Sulfate (IRON PO)  Active   fluocinolone (SYNALAR) 0.01 % Cream  Active   hydroquinone 4 % cream  Active   sertraline (ZOLOFT) 50 MG Tab  Active   tretinoin (RETIN-A) 0.05 % cream  Active   vitamin D2, Ergocalciferol, (DRISDOL) 1.25 MG (40060 UT) Cap capsule  Active                    ALLERGIES  Allergies   Allergen Reactions    Strawberry Rash       PHYSICAL EXAM  VITAL SIGNS: /76   Pulse 60   Temp 36.4 °C (97.6 °F) (Temporal)   Resp 18   Wt 79.1 kg (174 lb 6.1 oz)   SpO2 95%   BMI 29.02 kg/m²    Physical Exam  Constitutional:       Appearance: She is well-developed.   HENT:      Head: Normocephalic and atraumatic.   Eyes:      Pupils: Pupils are equal, round, and reactive to light.   Cardiovascular:      Rate and Rhythm: Normal rate and regular rhythm.   Pulmonary:      Effort: Pulmonary effort is normal. No accessory muscle usage or respiratory distress.      Breath sounds: Normal breath sounds.   Abdominal:      General: Bowel sounds are normal.      Palpations: Abdomen is soft. There is no mass.      Tenderness: There is no abdominal tenderness.   Musculoskeletal:         General: Normal range of motion.      Comments: Bilateral lower extremities without any tenderness to palpation.  No asymmetry between extremities.   Skin:     General: Skin is warm.      Capillary Refill: Capillary refill takes less than 2 seconds.   Neurological:      General: No focal deficit present.      Mental Status: She is alert.   Psychiatric:         Mood and Affect: Mood normal. Mood is not anxious.           DIAGNOSTIC STUDIES / PROCEDURES      LABS  Results for orders placed or performed during the hospital encounter of 09/25/23   CBC WITH DIFFERENTIAL   Result Value Ref Range    WBC 6.9 4.8 - 10.8 K/uL    RBC 4.44 4.20 - 5.40 M/uL    Hemoglobin 13.1 12.0 - 16.0 g/dL    Hematocrit 39.1 37.0 - 47.0 %    MCV 88.1 81.4 - 97.8 fL    MCH 29.5 27.0 - 33.0 pg    MCHC 33.5 32.2 - 35.5  g/dL    RDW 43.4 35.9 - 50.0 fL    Platelet Count 264 164 - 446 K/uL    MPV 10.8 9.0 - 12.9 fL    Neutrophils-Polys 70.00 44.00 - 72.00 %    Lymphocytes 19.30 (L) 22.00 - 41.00 %    Monocytes 8.50 0.00 - 13.40 %    Eosinophils 1.50 0.00 - 6.90 %    Basophils 0.40 0.00 - 1.80 %    Immature Granulocytes 0.30 0.00 - 0.90 %    Nucleated RBC 0.00 0.00 - 0.20 /100 WBC    Neutrophils (Absolute) 4.80 1.82 - 7.42 K/uL    Lymphs (Absolute) 1.32 1.00 - 4.80 K/uL    Monos (Absolute) 0.58 0.00 - 0.85 K/uL    Eos (Absolute) 0.10 0.00 - 0.51 K/uL    Baso (Absolute) 0.03 0.00 - 0.12 K/uL    Immature Granulocytes (abs) 0.02 0.00 - 0.11 K/uL    NRBC (Absolute) 0.00 K/uL   COMP METABOLIC PANEL   Result Value Ref Range    Sodium 139 135 - 145 mmol/L    Potassium 4.3 3.6 - 5.5 mmol/L    Chloride 104 96 - 112 mmol/L    Co2 25 20 - 33 mmol/L    Anion Gap 10.0 7.0 - 16.0    Glucose 89 65 - 99 mg/dL    Bun 17 8 - 22 mg/dL    Creatinine 0.71 0.50 - 1.40 mg/dL    Calcium 9.3 8.5 - 10.5 mg/dL    Correct Calcium 8.9 8.5 - 10.5 mg/dL    AST(SGOT) 18 12 - 45 U/L    ALT(SGPT) 18 2 - 50 U/L    Alkaline Phosphatase 64 30 - 99 U/L    Total Bilirubin 0.4 0.1 - 1.5 mg/dL    Albumin 4.5 3.2 - 4.9 g/dL    Total Protein 7.3 6.0 - 8.2 g/dL    Globulin 2.8 1.9 - 3.5 g/dL    A-G Ratio 1.6 g/dL   LIPASE   Result Value Ref Range    Lipase 16 11 - 82 U/L   HCG QUAL SERUM   Result Value Ref Range    Beta-Hcg Qualitative Serum Negative Negative   D-DIMER   Result Value Ref Range    D-Dimer <0.27 0.00 - 0.50 ug/mL (FEU)   TSH WITH REFLEX TO FT4   Result Value Ref Range    TSH 1.700 0.380 - 5.330 uIU/mL   ESTIMATED GFR   Result Value Ref Range    GFR (CKD-EPI) 111 >60 mL/min/1.73 m 2   Prothrombin Time   Result Value Ref Range    PT 13.4 12.0 - 14.6 sec    INR 1.01 0.87 - 1.13         COURSE & MEDICAL DECISION MAKING        INITIAL ASSESSMENT, COURSE AND PLAN  Care Narrative: Patient here with menorrhagia.  Will ensure patient is not pregnant and is certainly a  ectopic pregnancy or threatened miscarriage is in the differential.  Will ensure patient is not anemic with CBC.  Will ensure she is not uremic or have any other cause of bleeding diathesis with chemistry, INR.  We will also check TSH to see if patient's thyroid condition will be contributing to this issue.  Patient basic labs are very reassuring.  She is not significantly anemic.  Patient vitals have remained stable here.  Given that these have been going on for weeks, patient does not have any associated pain, I do not believe that emergent ultrasound is necessary.  Patient will follow-up with gynecology as an outpatient.  Patient is refusing any pelvic exam in the emergency department, I discussed that without a pelvic exam we could miss a potentially dangerous pathology that could lead to severe disability, infertility, or chronic pain or though highly unlikely death.  Patient would rather follow-up as an outpatient.  This patient is 38 years old, mildly overweight does not feel comfortable prescribing high-dose estrogen from the emergency department and patient will follow-up with gynecology for definitive management.        DISPOSITION AND DISCUSSIONS      Escalation of care considered, and ultimately not performed: Pelvic ultrasound deferred, see above for reasoning      FINAL DIAGNOSIS  1. Dysfunctional uterine bleeding    2. Menorrhagia with irregular cycle

## 2023-09-25 NOTE — ED TRIAGE NOTES
Pt comes in complaining of vaginal bleeding with clots for approx 8 days. Pt stating much heavier flow then her normal menstrual cycle.

## 2023-09-25 NOTE — ED NOTES
PT walked back with no assistance needed. Changed into a gown and was put on the monitor. PT unable to void at this time she said.

## 2023-09-25 NOTE — DISCHARGE INSTRUCTIONS
Your labs today are very reassuring, your blood counts are normal.  Your blood clot enzyme is also normal.  I would like you to follow-up with the women's health clinic or a gynecologist of your preference if symptoms are ongoing.

## 2023-09-26 ENCOUNTER — TELEPHONE (OUTPATIENT)
Dept: OBGYN | Facility: CLINIC | Age: 38
End: 2023-09-26

## 2023-09-26 ENCOUNTER — TELEPHONE (OUTPATIENT)
Dept: OBGYN | Facility: CLINIC | Age: 38
End: 2023-09-26
Payer: COMMERCIAL

## 2023-09-26 NOTE — TELEPHONE ENCOUNTER
Pt called stating was seen at the ER yesterday due to VB for 10 days and is having to switch through multiple pads everyday. Pt states per ER needs to be seen right away. Informed pt that her concerns and to schedule an appt have already been sent to [facility] and that once we have an availability appt pt will be notified. Informed pt that at the moment this week is currently booked but if she does proceed to have heavy VB that is filling a pad within an hour to go to ER to be further evaluated.     Pt understood and will await phone call for ER follow up appt

## 2023-09-26 NOTE — TELEPHONE ENCOUNTER
9/26 I called pt to schedule her for an ER follow up appointment. Pt stated she had been calling around to other doctors to see who could get her in, and she has an appointment with another office tomorrow. I confirmed with the pt that she does not want to be seen with us because she is being seen elsewhere. She verbalized agreement, that she will not be seen with [facility].

## 2023-11-27 ENCOUNTER — OFFICE VISIT (OUTPATIENT)
Dept: MEDICAL GROUP | Facility: PHYSICIAN GROUP | Age: 38
End: 2023-11-27
Payer: COMMERCIAL

## 2023-11-27 VITALS
SYSTOLIC BLOOD PRESSURE: 100 MMHG | HEART RATE: 98 BPM | TEMPERATURE: 97.6 F | BODY MASS INDEX: 29.71 KG/M2 | OXYGEN SATURATION: 96 % | RESPIRATION RATE: 16 BRPM | WEIGHT: 178.31 LBS | DIASTOLIC BLOOD PRESSURE: 72 MMHG | HEIGHT: 65 IN

## 2023-11-27 DIAGNOSIS — G89.29 CHRONIC PAIN OF LEFT KNEE: ICD-10-CM

## 2023-11-27 DIAGNOSIS — L98.9 SKIN LESION: ICD-10-CM

## 2023-11-27 DIAGNOSIS — E66.3 OVERWEIGHT: ICD-10-CM

## 2023-11-27 DIAGNOSIS — M25.562 CHRONIC PAIN OF LEFT KNEE: ICD-10-CM

## 2023-11-27 DIAGNOSIS — E55.9 VITAMIN D DEFICIENCY: ICD-10-CM

## 2023-11-27 PROBLEM — L81.1 MELASMA: Status: RESOLVED | Noted: 2021-09-27 | Resolved: 2023-11-27

## 2023-11-27 PROBLEM — O48.0 POST-DATES PREGNANCY: Status: RESOLVED | Noted: 2022-11-25 | Resolved: 2023-11-27

## 2023-11-27 PROBLEM — M79.671 PAIN IN BOTH FEET: Status: RESOLVED | Noted: 2021-09-27 | Resolved: 2023-11-27

## 2023-11-27 PROBLEM — M79.672 PAIN IN BOTH FEET: Status: RESOLVED | Noted: 2021-09-27 | Resolved: 2023-11-27

## 2023-11-27 PROCEDURE — 3074F SYST BP LT 130 MM HG: CPT | Performed by: PHYSICIAN ASSISTANT

## 2023-11-27 PROCEDURE — 99214 OFFICE O/P EST MOD 30 MIN: CPT | Performed by: PHYSICIAN ASSISTANT

## 2023-11-27 PROCEDURE — 3078F DIAST BP <80 MM HG: CPT | Performed by: PHYSICIAN ASSISTANT

## 2023-11-27 RX ORDER — PROMETHAZINE HYDROCHLORIDE 12.5 MG/1
TABLET ORAL
COMMUNITY
Start: 2023-09-27 | End: 2023-11-27

## 2023-11-27 RX ORDER — ERGOCALCIFEROL 1.25 MG/1
50000 CAPSULE ORAL
Qty: 12 CAPSULE | Refills: 1 | Status: ON HOLD | OUTPATIENT
Start: 2023-11-27 | End: 2024-02-09

## 2023-11-27 RX ORDER — NORGESTIMATE AND ETHINYL ESTRADIOL 0.25-0.035
KIT ORAL
COMMUNITY
Start: 2023-09-27 | End: 2023-11-27

## 2023-11-27 ASSESSMENT — ENCOUNTER SYMPTOMS
SHORTNESS OF BREATH: 0
FEVER: 0
CHILLS: 0

## 2023-11-27 ASSESSMENT — FIBROSIS 4 INDEX: FIB4 SCORE: 0.61

## 2023-11-27 NOTE — PROGRESS NOTES
"Subjective:     CC:     HPI:   Brooke presents today with the following:    Problem   Skin Lesion    Chronic, uncontrolled.  Started a year ago.  Small, raised lesion left lower leg, around 5mm.  Referring to dermatology.     Chronic Pain of Left Knee    Chronic, uncontrolled.  Pain in the posterior left knee for the past 6 months.  Worse with ambulation.  Denies any swelling or rashes     Overweight    Chronic, uncontrolled.  Delivered 7 months ago (22).  Weight after delivery: around 75kg  Current weight: 80kg  Eating habits: eats when she has time; has a hard time scheduling her meals.  Appetite fluctuates; feels like her stomach isn't processing foods quickly enough.  Feels like she needs to eat but feels like her stomach is still full  Doesn't sleep enough.  Bowels: every other day; normal for her.  Referred to RD.  Component      Latest Ref Rng 2023   TSH      0.380 - 5.330 uIU/mL 1.700               Vitamin D Deficiency    Chronic, uncontrolled.  Component      Latest Ref Rng 2023   25-Hydroxy Vitamin D 25      30 - 100 ng/mL 15 (L)    Starting 50K IU weekly.     Post Partum Depression    Acute, uncontrolled.  Long labor, didn't progress.  Had to do a .  OB/GYN started her on zoloft 25mg daily (started 2023).  Finds when the baby cries, she cries.  Hands shake when  feeds the baby.  Was crying when he was circumcised - couldn't stop.  \"I have this anxiety inside of me\"  Had COVID during pregnancy.  Lost her parents when she was a kid.  Had her first therapy session online yesterday.  Will make another therapy appointment.    2023 PHQ-9: 18      2023:  Patient started sertraline 2023.  She has not noticed any significant difference.  Patient still feels very overwhelmed with everything, care of her home, grocery shopping, the thought of going to work.  She is having difficulty concentrating/focusing.  Patient denies any SI/HI, specifically denies any thoughts " "of hurting her baby.  We will continue patient on 25 mg for the next 2-3 weeks, increasing to 50 if she feels like she has not noticed any change.  Patient to follow-up 3/1/2023, sooner as needed.  LA paperwork filled out in clinic today.      2/9/2023:  PHQ-9 today: 15  Has a rough few days due to lack of sleep (son didn't sleep well and was very fussy).  Doesn't feel worse but doesn't feel better.   Has been on sertraline 25mg            Labor and Delivery Indication for Care Or Intervention (Resolved)   Post-Dates Pregnancy (Resolved)   Arrest of Dilation, Delivered, Current Hospitalization (Resolved)   Pain in Both Feet (Resolved)   Melasma (Resolved)         ROS:  Review of Systems   Constitutional:  Negative for chills and fever.   Respiratory:  Negative for shortness of breath.    Cardiovascular:  Negative for chest pain.   Musculoskeletal:  Positive for joint pain.       Objective:     Exam:  /72 (BP Location: Left arm, Patient Position: Sitting, BP Cuff Size: Large adult)   Pulse 98   Temp 36.4 °C (97.6 °F) (Temporal)   Resp 16   Ht 1.651 m (5' 5\")   Wt 80.9 kg (178 lb 5 oz)   LMP 11/01/2023 (Approximate)   SpO2 96%   Breastfeeding No   BMI 29.67 kg/m²  Body mass index is 29.67 kg/m².    Physical Exam  Vitals reviewed.   Constitutional:       General: She is not in acute distress.     Appearance: Normal appearance.   Pulmonary:      Effort: Pulmonary effort is normal.   Musculoskeletal:      Comments: Knee exam unremarkable.  No palpable masses noted on the posterior left knee.   Skin:     Comments: 3-4mm raised lesion left lower leg. Slightly pink. No infection.   Neurological:      General: No focal deficit present.      Mental Status: She is alert.   Psychiatric:         Mood and Affect: Mood normal.         Behavior: Behavior normal.         Judgment: Judgment normal.           Assessment & Plan:     38 y.o. female with the following -     1. Skin lesion  Chronic, " uncontrolled.  Referring to dermatology.    - Referral to Dermatology    2. Chronic pain of left knee  Chronic, uncontrolled.  Ordering ultrasound.    - US-EXTREMITY VENOUS LOWER UNILAT LEFT; Future    3. Overweight  Chronic, uncontrolled.  Checking labs.    - Lipid Profile; Future  - TSH WITH REFLEX TO FT4; Future    4. Post partum depression  Chronic, stable.  Feels controlled off medication.    5. Vitamin D deficiency  Chronic, uncontrolled.  Starting 50,000 IU weekly.  Repeat labs in 2-3 months.      - VITAMIN D,25 HYDROXY (DEFICIENCY); Future  - vitamin D2, Ergocalciferol, (DRISDOL) 1.25 MG (94145 UT) Cap capsule; Take 1 Capsule by mouth every 7 days.  Dispense: 12 Capsule; Refill: 1        Repeat TSH, vitamin D, lipid panel in 2-3 months.        Return for lab discussion.    Please note that this dictation was created using voice recognition software. I have made every reasonable attempt to correct obvious errors, but I expect that there are errors of grammar and possibly content that I did not discover before finalizing the note.

## 2023-12-04 ENCOUNTER — APPOINTMENT (RX ONLY)
Dept: URBAN - METROPOLITAN AREA CLINIC 22 | Facility: CLINIC | Age: 38
Setting detail: DERMATOLOGY
End: 2023-12-04

## 2023-12-04 DIAGNOSIS — L30.8 OTHER SPECIFIED DERMATITIS: ICD-10-CM | Status: INADEQUATELY CONTROLLED

## 2023-12-04 DIAGNOSIS — D22 MELANOCYTIC NEVI: ICD-10-CM

## 2023-12-04 DIAGNOSIS — Z71.89 OTHER SPECIFIED COUNSELING: ICD-10-CM

## 2023-12-04 DIAGNOSIS — L81.4 OTHER MELANIN HYPERPIGMENTATION: ICD-10-CM

## 2023-12-04 DIAGNOSIS — D18.0 HEMANGIOMA: ICD-10-CM

## 2023-12-04 DIAGNOSIS — L81.1 CHLOASMA: ICD-10-CM | Status: INADEQUATELY CONTROLLED

## 2023-12-04 PROBLEM — D18.01 HEMANGIOMA OF SKIN AND SUBCUTANEOUS TISSUE: Status: ACTIVE | Noted: 2023-12-04

## 2023-12-04 PROBLEM — D23.72 OTHER BENIGN NEOPLASM OF SKIN OF LEFT LOWER LIMB, INCLUDING HIP: Status: ACTIVE | Noted: 2023-12-04

## 2023-12-04 PROBLEM — D22.5 MELANOCYTIC NEVI OF TRUNK: Status: ACTIVE | Noted: 2023-12-04

## 2023-12-04 PROCEDURE — ? DEFER

## 2023-12-04 PROCEDURE — ? COUNSELING

## 2023-12-04 PROCEDURE — ? TREATMENT REGIMEN

## 2023-12-04 PROCEDURE — ? PHOTO-DOCUMENTATION

## 2023-12-04 PROCEDURE — ? PRESCRIPTION

## 2023-12-04 PROCEDURE — 99204 OFFICE O/P NEW MOD 45 MIN: CPT

## 2023-12-04 PROCEDURE — ? SUNSCREEN RECOMMENDATIONS

## 2023-12-04 RX ORDER — H-QUINONE/TRETINOIN/HYDROCORT 4 %-0.025%
EMULSION (GRAM) TOPICAL QD
Qty: 30 | Refills: 4 | Status: ERX | COMMUNITY
Start: 2023-12-04

## 2023-12-04 RX ORDER — CLOBETASOL PROPIONATE 0.5 MG/G
CREAM TOPICAL BID
Qty: 60 | Refills: 1 | Status: ERX | COMMUNITY
Start: 2023-12-04

## 2023-12-04 RX ADMIN — Medication: at 00:00

## 2023-12-04 RX ADMIN — CLOBETASOL PROPIONATE: 0.5 CREAM TOPICAL at 00:00

## 2023-12-04 ASSESSMENT — LOCATION ZONE DERM
LOCATION ZONE: HAND
LOCATION ZONE: FINGER
LOCATION ZONE: FACE
LOCATION ZONE: TRUNK

## 2023-12-04 ASSESSMENT — LOCATION SIMPLE DESCRIPTION DERM
LOCATION SIMPLE: LEFT INDEX FINGER
LOCATION SIMPLE: LEFT FOREHEAD
LOCATION SIMPLE: LEFT HAND
LOCATION SIMPLE: RIGHT HAND
LOCATION SIMPLE: RIGHT FOREHEAD
LOCATION SIMPLE: LEFT UPPER BACK
LOCATION SIMPLE: RIGHT INDEX FINGER
LOCATION SIMPLE: ABDOMEN

## 2023-12-04 ASSESSMENT — SEVERITY ASSESSMENT
SEVERITY: MODERATE, MODERATELY DARKER THAN THE SURROUNDING NORMAL SKIN
SEVERITY: MODERATE

## 2023-12-04 ASSESSMENT — LOCATION DETAILED DESCRIPTION DERM
LOCATION DETAILED: EPIGASTRIC SKIN
LOCATION DETAILED: LEFT SUPERIOR MEDIAL UPPER BACK
LOCATION DETAILED: LEFT FOREHEAD
LOCATION DETAILED: RIGHT DISTAL PALMAR INDEX FINGER
LOCATION DETAILED: LEFT RADIAL PALM
LOCATION DETAILED: LEFT DISTAL PALMAR INDEX FINGER
LOCATION DETAILED: RIGHT THENAR EMINENCE
LOCATION DETAILED: RIGHT INFERIOR FOREHEAD

## 2023-12-07 ENCOUNTER — HOSPITAL ENCOUNTER (OUTPATIENT)
Dept: RADIOLOGY | Facility: MEDICAL CENTER | Age: 38
End: 2023-12-07
Attending: PHYSICIAN ASSISTANT
Payer: COMMERCIAL

## 2023-12-07 DIAGNOSIS — G89.29 CHRONIC PAIN OF LEFT KNEE: ICD-10-CM

## 2023-12-07 DIAGNOSIS — M25.562 CHRONIC PAIN OF LEFT KNEE: ICD-10-CM

## 2023-12-07 PROCEDURE — 93971 EXTREMITY STUDY: CPT | Mod: LT

## 2023-12-11 ENCOUNTER — TELEMEDICINE (OUTPATIENT)
Dept: MEDICAL GROUP | Facility: PHYSICIAN GROUP | Age: 38
End: 2023-12-11
Payer: COMMERCIAL

## 2023-12-11 VITALS — WEIGHT: 174 LBS | HEIGHT: 65 IN | BODY MASS INDEX: 28.99 KG/M2 | RESPIRATION RATE: 16 BRPM

## 2023-12-11 DIAGNOSIS — G89.29 ACUTE EXACERBATION OF CHRONIC LOW BACK PAIN: ICD-10-CM

## 2023-12-11 DIAGNOSIS — R22.31 WRIST LUMP, RIGHT: ICD-10-CM

## 2023-12-11 DIAGNOSIS — M25.562 CHRONIC PAIN OF LEFT KNEE: ICD-10-CM

## 2023-12-11 DIAGNOSIS — G89.29 CHRONIC PAIN OF LEFT KNEE: ICD-10-CM

## 2023-12-11 DIAGNOSIS — M54.50 ACUTE EXACERBATION OF CHRONIC LOW BACK PAIN: ICD-10-CM

## 2023-12-11 PROCEDURE — 99214 OFFICE O/P EST MOD 30 MIN: CPT | Mod: 95 | Performed by: PHYSICIAN ASSISTANT

## 2023-12-11 RX ORDER — CLOBETASOL PROPIONATE 0.5 MG/G
CREAM TOPICAL
Status: ON HOLD | COMMUNITY
Start: 2023-12-04 | End: 2024-02-09

## 2023-12-11 RX ORDER — MELOXICAM 7.5 MG/1
7.5-15 TABLET ORAL DAILY
Qty: 30 TABLET | Refills: 1 | Status: ON HOLD | OUTPATIENT
Start: 2023-12-11 | End: 2024-02-09

## 2023-12-11 ASSESSMENT — ENCOUNTER SYMPTOMS
BACK PAIN: 1
CHILLS: 0
SHORTNESS OF BREATH: 0
FEVER: 0

## 2023-12-11 ASSESSMENT — FIBROSIS 4 INDEX: FIB4 SCORE: 0.61

## 2023-12-11 NOTE — PROGRESS NOTES
Virtual Visit: Established Patient   This visit was conducted via Zoom using secure and encrypted videoconferencing technology.   The patient was in their home in the state [facility].    The patient's identity was confirmed and verbal consent was obtained for this virtual visit.     Subjective:   CC:   Chief Complaint   Patient presents with    Follow-Up     Leg pain - Next step    Back Pain     Patient experiencing x2 years, even before son was born. When bending down, gets stuck in position.       Brooke Young is a 38 y.o. female presenting for evaluation and management of:.    Problem   Acute Exacerbation of Chronic Low Back Pain    Chronic, uncontrolled.  Had issues prior to pregnancy.  Has issues bending over --> hard to stand back up.  Often needs assistance getting upright.  Denies radiating pain, saddle anesthesia, bowel/bladder changes, extremity weakness.  Referring to PT.     Wrist Lump, Right    Chronic, uncontrolled.  Bump dorsum right wrist x 1 year.  Painful x 6-8 months.  Likely ganglion cyst.  Starting meloxicam.  Referring to orthopedics.     Chronic Pain of Left Knee    Chronic, uncontrolled.  Pain in the posterior left knee for the past 6 months.  Worse with ambulation.  Denies any swelling or rashes  Negative US.  Referring to orthopedics.           ROS   + leg pain, back pain    Current medicines (including changes today)  Current Outpatient Medications   Medication Sig Dispense Refill    clobetasol (TEMOVATE) 0.05 % Cream       meloxicam (MOBIC) 7.5 MG Tab Take 1-2 Tablets by mouth every day. 30 Tablet 1    vitamin D2, Ergocalciferol, (DRISDOL) 1.25 MG (71784 UT) Cap capsule Take 1 Capsule by mouth every 7 days. 12 Capsule 1     No current facility-administered medications for this visit.       Patient Active Problem List    Diagnosis Date Noted    Acute exacerbation of chronic low back pain 12/11/2023    Wrist lump, right 12/11/2023    Skin lesion 11/27/2023    Chronic pain of  "left knee 11/27/2023    Overweight 07/05/2023    Dyslipidemia 02/09/2023    Vitamin D deficiency 02/09/2023    Post partum depression 01/24/2023    Snoring 09/27/2021        Objective:   Resp 16   Ht 1.651 m (5' 5\") Comment: Per patient  Wt 78.9 kg (174 lb) Comment: Per patient  LMP 12/07/2023 (Approximate)   Breastfeeding No   BMI 28.96 kg/m²     Physical Exam:  Constitutional: Alert, no distress, well-groomed.  Skin: No rashes in visible areas.  Eye: Round. Conjunctiva clear, lids normal. No icterus.   ENMT: Lips pink without lesions, good dentition, moist mucous membranes. Phonation normal.  Neck: No masses, no thyromegaly. Moves freely without pain.  Respiratory: Unlabored respiratory effort, no cough or audible wheeze  Psych: Alert and oriented x3, normal affect and mood.     Assessment and Plan:   The following treatment plan was discussed:     1. Chronic pain of left knee  Chronic, uncontrolled.  Referring to orthopedics and PT.  Meloxicam as needed for pain.    2. Acute exacerbation of chronic low back pain  Chronic, uncontrolled.  Referring to orthopedics and PT.  Meloxicam as needed for pain.    3. Wrist lump, right    Other orders  - clobetasol (TEMOVATE) 0.05 % Cream      Follow-up: Return if symptoms worsen or fail to improve.         "

## 2023-12-11 NOTE — PROGRESS NOTES
Subjective:     CC: back pain, leg pain    HPI:   Brooke presents today with the following:    No problems updated.      ROS:  Review of Systems   Constitutional:  Negative for chills and fever.   Respiratory:  Negative for shortness of breath.    Cardiovascular:  Negative for chest pain.   Musculoskeletal:  Positive for back pain and joint pain.       Objective:     Exam:  LMP 11/01/2023 (Approximate)  There is no height or weight on file to calculate BMI.    Physical Exam  Vitals reviewed.   Constitutional:       General: She is not in acute distress.     Appearance: Normal appearance.   Pulmonary:      Effort: Pulmonary effort is normal.   Neurological:      General: No focal deficit present.      Mental Status: She is alert.   Psychiatric:         Mood and Affect: Mood normal.         Behavior: Behavior normal.         Judgment: Judgment normal.             Assessment & Plan:     38 y.o. female with the following -     ***          No follow-ups on file.    Please note that this dictation was created using voice recognition software. I have made every reasonable attempt to correct obvious errors, but I expect that there are errors of grammar and possibly content that I did not discover before finalizing the note.

## 2024-01-22 ENCOUNTER — HOSPITAL ENCOUNTER (OUTPATIENT)
Dept: LAB | Facility: MEDICAL CENTER | Age: 39
End: 2024-01-22
Payer: COMMERCIAL

## 2024-01-22 PROCEDURE — 88175 CYTOPATH C/V AUTO FLUID REDO: CPT

## 2024-01-26 LAB
COMMENT NL11729A: NORMAL
CYTOLOGIST CVX/VAG CYTO: NORMAL
CYTOLOGY CVX/VAG DOC CYTO: NORMAL
CYTOLOGY CVX/VAG DOC THIN PREP: NORMAL
NOTE NL11727A: NORMAL
OTHER STN SPEC: NORMAL
QC REVIEWED BY NL11722A: NORMAL
STAT OF ADQ CVX/VAG CYTO-IMP: NORMAL

## 2024-02-04 DIAGNOSIS — N92.1 MENOMETRORRHAGIA: ICD-10-CM

## 2024-02-04 NOTE — PROGRESS NOTES
Pt. Has had relentless uterine bleeding for > 3 weeks, unresponsive to combination OCPs and minimally responsive to MPA 10 mg/day.    PLAN:    STAT pelvic ultrasound to assess for fibroids, polyps, endometrial pathology.  Consider hysteroscopy, D and C Friday 2/9/2024.

## 2024-02-05 ENCOUNTER — HOSPITAL ENCOUNTER (OUTPATIENT)
Dept: RADIOLOGY | Facility: MEDICAL CENTER | Age: 39
End: 2024-02-05
Attending: OBSTETRICS & GYNECOLOGY
Payer: COMMERCIAL

## 2024-02-05 DIAGNOSIS — N92.1 MENOMETRORRHAGIA: ICD-10-CM

## 2024-02-05 PROCEDURE — 76830 TRANSVAGINAL US NON-OB: CPT

## 2024-02-09 ENCOUNTER — ANESTHESIA EVENT (OUTPATIENT)
Dept: SURGERY | Facility: MEDICAL CENTER | Age: 39
End: 2024-02-09
Payer: COMMERCIAL

## 2024-02-09 ENCOUNTER — HOSPITAL ENCOUNTER (OUTPATIENT)
Facility: MEDICAL CENTER | Age: 39
End: 2024-02-09
Attending: OBSTETRICS & GYNECOLOGY | Admitting: OBSTETRICS & GYNECOLOGY
Payer: COMMERCIAL

## 2024-02-09 ENCOUNTER — ANESTHESIA (OUTPATIENT)
Dept: SURGERY | Facility: MEDICAL CENTER | Age: 39
End: 2024-02-09
Payer: COMMERCIAL

## 2024-02-09 VITALS
RESPIRATION RATE: 12 BRPM | WEIGHT: 179.45 LBS | BODY MASS INDEX: 29.9 KG/M2 | DIASTOLIC BLOOD PRESSURE: 55 MMHG | SYSTOLIC BLOOD PRESSURE: 113 MMHG | HEIGHT: 65 IN | TEMPERATURE: 97 F | OXYGEN SATURATION: 96 % | HEART RATE: 100 BPM

## 2024-02-09 PROBLEM — N93.8 DUB (DYSFUNCTIONAL UTERINE BLEEDING): Status: ACTIVE | Noted: 2024-02-09

## 2024-02-09 LAB — HCG UR QL: NEGATIVE

## 2024-02-09 PROCEDURE — 700102 HCHG RX REV CODE 250 W/ 637 OVERRIDE(OP): Performed by: STUDENT IN AN ORGANIZED HEALTH CARE EDUCATION/TRAINING PROGRAM

## 2024-02-09 PROCEDURE — 160002 HCHG RECOVERY MINUTES (STAT): Performed by: OBSTETRICS & GYNECOLOGY

## 2024-02-09 PROCEDURE — A9270 NON-COVERED ITEM OR SERVICE: HCPCS | Performed by: STUDENT IN AN ORGANIZED HEALTH CARE EDUCATION/TRAINING PROGRAM

## 2024-02-09 PROCEDURE — 700102 HCHG RX REV CODE 250 W/ 637 OVERRIDE(OP): Performed by: OBSTETRICS & GYNECOLOGY

## 2024-02-09 PROCEDURE — 160035 HCHG PACU - 1ST 60 MINS PHASE I: Performed by: OBSTETRICS & GYNECOLOGY

## 2024-02-09 PROCEDURE — 160048 HCHG OR STATISTICAL LEVEL 1-5: Performed by: OBSTETRICS & GYNECOLOGY

## 2024-02-09 PROCEDURE — A9270 NON-COVERED ITEM OR SERVICE: HCPCS | Performed by: OBSTETRICS & GYNECOLOGY

## 2024-02-09 PROCEDURE — 700101 HCHG RX REV CODE 250: Performed by: ANESTHESIOLOGY

## 2024-02-09 PROCEDURE — 160041 HCHG SURGERY MINUTES - EA ADDL 1 MIN LEVEL 4: Performed by: OBSTETRICS & GYNECOLOGY

## 2024-02-09 PROCEDURE — 700111 HCHG RX REV CODE 636 W/ 250 OVERRIDE (IP): Performed by: ANESTHESIOLOGY

## 2024-02-09 PROCEDURE — 160029 HCHG SURGERY MINUTES - 1ST 30 MINS LEVEL 4: Performed by: OBSTETRICS & GYNECOLOGY

## 2024-02-09 PROCEDURE — 700111 HCHG RX REV CODE 636 W/ 250 OVERRIDE (IP): Mod: JZ | Performed by: ANESTHESIOLOGY

## 2024-02-09 PROCEDURE — 700101 HCHG RX REV CODE 250: Performed by: OBSTETRICS & GYNECOLOGY

## 2024-02-09 PROCEDURE — 160009 HCHG ANES TIME/MIN: Performed by: OBSTETRICS & GYNECOLOGY

## 2024-02-09 PROCEDURE — 88305 TISSUE EXAM BY PATHOLOGIST: CPT

## 2024-02-09 PROCEDURE — 160046 HCHG PACU - 1ST 60 MINS PHASE II: Performed by: OBSTETRICS & GYNECOLOGY

## 2024-02-09 PROCEDURE — 81025 URINE PREGNANCY TEST: CPT

## 2024-02-09 PROCEDURE — 160025 RECOVERY II MINUTES (STATS): Performed by: OBSTETRICS & GYNECOLOGY

## 2024-02-09 PROCEDURE — 700105 HCHG RX REV CODE 258: Performed by: OBSTETRICS & GYNECOLOGY

## 2024-02-09 RX ORDER — CELECOXIB 200 MG/1
400 CAPSULE ORAL ONCE
Status: COMPLETED | OUTPATIENT
Start: 2024-02-09 | End: 2024-02-09

## 2024-02-09 RX ORDER — MEDROXYPROGESTERONE ACETATE 10 MG/1
10 TABLET ORAL DAILY
COMMUNITY

## 2024-02-09 RX ORDER — MISOPROSTOL 200 UG/1
TABLET ORAL
Status: DISCONTINUED
Start: 2024-02-09 | End: 2024-02-09 | Stop reason: HOSPADM

## 2024-02-09 RX ORDER — DIPHENHYDRAMINE HYDROCHLORIDE 50 MG/ML
12.5 INJECTION INTRAMUSCULAR; INTRAVENOUS
Status: DISCONTINUED | OUTPATIENT
Start: 2024-02-09 | End: 2024-02-09 | Stop reason: HOSPADM

## 2024-02-09 RX ORDER — DEXAMETHASONE SODIUM PHOSPHATE 4 MG/ML
INJECTION, SOLUTION INTRA-ARTICULAR; INTRALESIONAL; INTRAMUSCULAR; INTRAVENOUS; SOFT TISSUE PRN
Status: DISCONTINUED | OUTPATIENT
Start: 2024-02-09 | End: 2024-02-09 | Stop reason: SURG

## 2024-02-09 RX ORDER — CEFAZOLIN SODIUM 1 G/3ML
INJECTION, POWDER, FOR SOLUTION INTRAMUSCULAR; INTRAVENOUS PRN
Status: DISCONTINUED | OUTPATIENT
Start: 2024-02-09 | End: 2024-02-09 | Stop reason: SURG

## 2024-02-09 RX ORDER — ACETAMINOPHEN 500 MG
1000 TABLET ORAL ONCE
Status: COMPLETED | OUTPATIENT
Start: 2024-02-09 | End: 2024-02-09

## 2024-02-09 RX ORDER — GABAPENTIN 300 MG/1
300 CAPSULE ORAL ONCE
Status: COMPLETED | OUTPATIENT
Start: 2024-02-09 | End: 2024-02-09

## 2024-02-09 RX ORDER — PROMETHAZINE HYDROCHLORIDE 12.5 MG/1
12.5 SUPPOSITORY RECTAL EVERY 4 HOURS PRN
Status: DISCONTINUED | OUTPATIENT
Start: 2024-02-09 | End: 2024-02-09 | Stop reason: HOSPADM

## 2024-02-09 RX ORDER — SODIUM CHLORIDE, SODIUM LACTATE, POTASSIUM CHLORIDE, CALCIUM CHLORIDE 600; 310; 30; 20 MG/100ML; MG/100ML; MG/100ML; MG/100ML
INJECTION, SOLUTION INTRAVENOUS CONTINUOUS
Status: DISCONTINUED | OUTPATIENT
Start: 2024-02-09 | End: 2024-02-09 | Stop reason: HOSPADM

## 2024-02-09 RX ORDER — OXYCODONE HCL 5 MG/5 ML
5 SOLUTION, ORAL ORAL
Status: DISCONTINUED | OUTPATIENT
Start: 2024-02-09 | End: 2024-02-09 | Stop reason: HOSPADM

## 2024-02-09 RX ORDER — BUPIVACAINE HYDROCHLORIDE 2.5 MG/ML
INJECTION, SOLUTION EPIDURAL; INFILTRATION; INTRACAUDAL
Status: DISCONTINUED
Start: 2024-02-09 | End: 2024-02-09 | Stop reason: HOSPADM

## 2024-02-09 RX ORDER — EPINEPHRINE 1 MG/ML(1)
AMPUL (ML) INJECTION
Status: DISCONTINUED
Start: 2024-02-09 | End: 2024-02-09 | Stop reason: HOSPADM

## 2024-02-09 RX ORDER — METHYLERGONOVINE MALEATE 0.2 MG/ML
INJECTION INTRAVENOUS
Status: DISCONTINUED
Start: 2024-02-09 | End: 2024-02-09 | Stop reason: HOSPADM

## 2024-02-09 RX ORDER — OXYTOCIN 10 [USP'U]/ML
INJECTION, SOLUTION INTRAMUSCULAR; INTRAVENOUS
Status: DISCONTINUED
Start: 2024-02-09 | End: 2024-02-09 | Stop reason: HOSPADM

## 2024-02-09 RX ORDER — ONDANSETRON 2 MG/ML
4 INJECTION INTRAMUSCULAR; INTRAVENOUS
Status: COMPLETED | OUTPATIENT
Start: 2024-02-09 | End: 2024-02-09

## 2024-02-09 RX ORDER — HALOPERIDOL 5 MG/ML
1 INJECTION INTRAMUSCULAR
Status: DISCONTINUED | OUTPATIENT
Start: 2024-02-09 | End: 2024-02-09 | Stop reason: HOSPADM

## 2024-02-09 RX ORDER — OXYCODONE HCL 5 MG/5 ML
10 SOLUTION, ORAL ORAL
Status: DISCONTINUED | OUTPATIENT
Start: 2024-02-09 | End: 2024-02-09 | Stop reason: HOSPADM

## 2024-02-09 RX ORDER — MIDAZOLAM HYDROCHLORIDE 1 MG/ML
INJECTION INTRAMUSCULAR; INTRAVENOUS PRN
Status: DISCONTINUED | OUTPATIENT
Start: 2024-02-09 | End: 2024-02-09 | Stop reason: SURG

## 2024-02-09 RX ORDER — ONDANSETRON 2 MG/ML
INJECTION INTRAMUSCULAR; INTRAVENOUS PRN
Status: DISCONTINUED | OUTPATIENT
Start: 2024-02-09 | End: 2024-02-09 | Stop reason: SURG

## 2024-02-09 RX ORDER — EPHEDRINE SULFATE 50 MG/ML
INJECTION, SOLUTION INTRAVENOUS PRN
Status: DISCONTINUED | OUTPATIENT
Start: 2024-02-09 | End: 2024-02-09 | Stop reason: SURG

## 2024-02-09 RX ORDER — BUPIVACAINE HYDROCHLORIDE AND EPINEPHRINE 2.5; 5 MG/ML; UG/ML
INJECTION, SOLUTION EPIDURAL; INFILTRATION; INTRACAUDAL; PERINEURAL
Status: DISCONTINUED | OUTPATIENT
Start: 2024-02-09 | End: 2024-02-09 | Stop reason: HOSPADM

## 2024-02-09 RX ADMIN — PROPOFOL 50 MG: 10 INJECTION, EMULSION INTRAVENOUS at 16:33

## 2024-02-09 RX ADMIN — MIDAZOLAM HYDROCHLORIDE 2 MG: 1 INJECTION, SOLUTION INTRAMUSCULAR; INTRAVENOUS at 16:13

## 2024-02-09 RX ADMIN — ONDANSETRON 4 MG: 2 INJECTION INTRAMUSCULAR; INTRAVENOUS at 17:17

## 2024-02-09 RX ADMIN — PROPOFOL 200 MG: 10 INJECTION, EMULSION INTRAVENOUS at 16:13

## 2024-02-09 RX ADMIN — FENTANYL CITRATE 100 MCG: 50 INJECTION, SOLUTION INTRAMUSCULAR; INTRAVENOUS at 16:13

## 2024-02-09 RX ADMIN — ONDANSETRON 4 MG: 2 INJECTION INTRAMUSCULAR; INTRAVENOUS at 16:26

## 2024-02-09 RX ADMIN — CELECOXIB 400 MG: 200 CAPSULE ORAL at 15:35

## 2024-02-09 RX ADMIN — PROPOFOL 100 MG: 10 INJECTION, EMULSION INTRAVENOUS at 16:25

## 2024-02-09 RX ADMIN — ACETAMINOPHEN 1000 MG: 500 TABLET ORAL at 14:52

## 2024-02-09 RX ADMIN — SODIUM CHLORIDE, POTASSIUM CHLORIDE, SODIUM LACTATE AND CALCIUM CHLORIDE: 600; 310; 30; 20 INJECTION, SOLUTION INTRAVENOUS at 15:18

## 2024-02-09 RX ADMIN — DEXAMETHASONE SODIUM PHOSPHATE 4 MG: 4 INJECTION INTRA-ARTICULAR; INTRALESIONAL; INTRAMUSCULAR; INTRAVENOUS; SOFT TISSUE at 16:26

## 2024-02-09 RX ADMIN — CEFAZOLIN 2 G: 1 INJECTION, POWDER, FOR SOLUTION INTRAMUSCULAR; INTRAVENOUS at 16:20

## 2024-02-09 RX ADMIN — EPHEDRINE SULFATE 25 MG: 50 INJECTION, SOLUTION INTRAVENOUS at 16:34

## 2024-02-09 RX ADMIN — GABAPENTIN 300 MG: 300 CAPSULE ORAL at 14:52

## 2024-02-09 ASSESSMENT — PAIN SCALES - GENERAL: PAIN_LEVEL: 2

## 2024-02-09 ASSESSMENT — PAIN DESCRIPTION - PAIN TYPE
TYPE: SURGICAL PAIN

## 2024-02-09 ASSESSMENT — FIBROSIS 4 INDEX: FIB4 SCORE: 0.61

## 2024-02-10 NOTE — ANESTHESIA PREPROCEDURE EVALUATION
Case: [id number] Date/Time: 02/09/24 1545    Procedure: DILATATION AND CURETTAGE WITH SUCTION (Uterus)    Anesthesia type: General    Pre-op diagnosis: UTERINE BLEEDING    Location: [facility] ROOM 25 / SURGERY SAME DAY [facility]    Surgeons: Yovany Parry M.D.            Relevant Problems   ANESTHESIA (within normal limits)      PULMONARY (within normal limits)      NEURO (within normal limits)      CARDIAC (within normal limits)      GI (within normal limits)       (within normal limits)      ENDO (within normal limits)      OB (within normal limits)       Physical Exam    Airway   Mallampati: II  TM distance: >3 FB  Neck ROM: full       Cardiovascular - normal exam  Rhythm: regular  Rate: normal  (-) murmur     Dental - normal exam           Pulmonary - normal exam  Breath sounds clear to auscultation     Abdominal    Neurological - normal exam                   Anesthesia Plan    ASA 1       Plan - general       Airway plan will be LMA        Plan Factors:   Patient was previously instructed to abstain from smoking on day of procedure.  Patient did not smoke on day of procedure.      Induction: intravenous    Postoperative Plan: Postoperative administration of opioids is intended.    Pertinent diagnostic labs and testing reviewed    Informed Consent:    Anesthetic plan and risks discussed with patient.    Use of blood products discussed with: patient whom consented to blood products.

## 2024-02-10 NOTE — DISCHARGE INSTRUCTIONS
HOME CARE INSTRUCTIONS    ACTIVITY: Rest and take it easy for the first 24 hours.  A responsible adult is recommended to remain with you during that time.  It is normal to feel sleepy.  We encourage you to not do anything that requires balance, judgment or coordination.    FOR 24 HOURS DO NOT:  Drive, operate machinery or run household appliances.  Drink beer or alcoholic beverages.  Make important decisions or sign legal documents.    SPECIAL INSTRUCTIONS: see previous page     DIET: To avoid nausea, slowly advance diet as tolerated, avoiding spicy or greasy foods for the first day.  Add more substantial food to your diet according to your physician's instructions.  Babies can be fed formula or breast milk as soon as they are hungry.  INCREASE FLUIDS AND FIBER TO AVOID CONSTIPATION.      MEDICATIONS: Resume taking daily medication.  Take prescribed pain medication with food.  If no medication is prescribed, you may take non-aspirin pain medication if needed.  PAIN MEDICATION CAN BE VERY CONSTIPATING.  Take a stool softener or laxative such as senokot, pericolace, or milk of magnesia if needed.    Last pain medication given:  Tylenol 2:52pm   Celebrex (like ibuprofen/motrin) at 3:35pm   Gabapentin 2:52pm    A follow-up appointment should be arranged with your doctor in ; call to schedule.    You should CALL YOUR PHYSICIAN if you develop:  Fever greater than 101 degrees F.  Pain not relieved by medication, or persistent nausea or vomiting.  Excessive bleeding (blood soaking through dressing) or unexpected drainage from the wound.  Extreme redness or swelling around the incision site, drainage of pus or foul smelling drainage.  Inability to urinate or empty your bladder within 8 hours.  Problems with breathing or chest pain.    You should call 911 if you develop problems with breathing or chest pain.  If you are unable to contact your doctor or surgical center, you should go to the nearest emergency room or urgent  [facility] center.  Physician's telephone #: Dr. Parry [phone]    MILD FLU-LIKE SYMPTOMS ARE NORMAL.  YOU MAY EXPERIENCE GENERALIZED MUSCLE ACHES, THROAT IRRITATION, HEADACHE AND/OR SOME NAUSEA.    If any questions arise, call your doctor.  If your doctor is not available, please feel free to call the Surgical Center at [phone].  The Center is open Monday through Friday from 7AM to 7PM.      A registered nurse may call you a few days after your surgery to see how you are doing after your procedure.    You may also receive a survey in the mail within the next two weeks and we ask that you take a few moments to complete the survey and return it to us.  Our goal is to provide you with very good care and we value your comments.     Depression / Suicide Risk    As you are discharged from this [facility] facility, it is important to learn how to keep safe from harming yourself.    Recognize the warning signs:  Abrupt changes in personality, positive or negative- including increase in energy   Giving away possessions  Change in eating patterns- significant weight changes-  positive or negative  Change in sleeping patterns- unable to sleep or sleeping all the time   Unwillingness or inability to communicate  Depression  Unusual sadness, discouragement and loneliness  Talk of wanting to die  Neglect of personal appearance   Rebelliousness- reckless behavior  Withdrawal from people/activities they love  Confusion- inability to concentrate     If you or a loved one observes any of these behaviors or has concerns about self-harm, here's what you can do:  Talk about it- your feelings and reasons for harming yourself  Remove any means that you might use to hurt yourself (examples: pills, rope, extension cords, firearm)  Get professional help from the community (Mental Health, Substance Abuse, psychological counseling)  Do not be alone:Call your Safe Contact- someone whom you trust who will be there for you.  Call  your local CRISIS HOTLINE 075-1106 or 390-381-4270  Call your local Children's Mobile Crisis Response Team [place] (860) 863-7612 or www.[employer]  Call the toll free National Suicide Prevention Hotlines   National Suicide Prevention Lifeline 262-042-GTCT (0311)  [facility] Line Network 800-SUICIDE (445-8616)    I acknowledge receipt and understanding of these Home Care instructions.

## 2024-02-10 NOTE — ANESTHESIA PROCEDURE NOTES
Airway    Date/Time: 2/9/2024 4:14 PM    Performed by: Marv Pierre M.D.  Authorized by: Marv Pierre M.D.    Location:  OR  Urgency:  Elective  Indications for Airway Management:  Anesthesia      Spontaneous Ventilation: absent    Sedation Level:  Deep  Preoxygenated: Yes    Final Airway Type:  Supraglottic airway  Final Supraglottic Airway:  Standard LMA    SGA Size:  3  Number of Attempts at Approach:  1

## 2024-02-10 NOTE — ANESTHESIA POSTPROCEDURE EVALUATION
Patient: Brooke Young    Procedure Summary       Date: 02/09/24 Room / Location: [facility] ROOM 25 / SURGERY SAME DAY [facility]    Anesthesia Start: 1611 Anesthesia Stop: 1646    Procedure: DILATATION AND CURETTAGE WITH SUCTION (Uterus) Diagnosis: (UTERINE BLEEDING)    Surgeons: Yovany Parry M.D. Responsible Provider: Marv Pierre M.D.    Anesthesia Type: general ASA Status: 1            Final Anesthesia Type: general  Last vitals  BP   Blood Pressure: (!) (P) 81/47    Temp   (P) 36.1 °C (97 °F)    Pulse   (P) 90   Resp   (P) 16    SpO2   (P) 99 %      Anesthesia Post Evaluation    Patient location during evaluation: PACU  Patient participation: complete - patient participated  Level of consciousness: awake and alert  Pain score: 2    Airway patency: patent  Anesthetic complications: no  Cardiovascular status: hemodynamically stable  Respiratory status: acceptable  Hydration status: euvolemic    PONV: none          There were no known notable events for this encounter.     Nurse Pain Score: 2 (NPRS)

## 2024-02-10 NOTE — ANESTHESIA TIME REPORT
Anesthesia Start and Stop Event Times       Date Time Event    2/9/2024 1603 Ready for Procedure     1611 Anesthesia Start     1646 Anesthesia Stop          Responsible Staff  02/09/24      Name Role Begin End    Marv Pierre M.D. Anesth 1611 1646          Overtime Reason:  overtime, subspecialty call-back    Comments:

## 2024-02-10 NOTE — OR NURSING
1643 - Pt to PACU 3 from OR.  Bedside report from anesthesiologist and RN.  Attached to monitoring, VSS, breathing is calm and unlabored on 4L oxymask with oral airway in place, zhen pad in place CDI.     1654- Oral airway dc'd. Dr. Parry voalted and asked if pt needs to void prior to discharge, per Dr. Parry pt does not need to void before discharging.     1700- Pt's  brought back to bedside, pt tolerating sips of water, no signs of pain or nausea.    1705- reviewed discharge instructions with pt and pt's , all questions answered, al instructions acknowledged.     1717- Given zofran for nausea per MAR.     1735: Assisted patient into clothing.     1756: PIV and ID bands removed. Assisted patient into wheelchair.  Pt then wheeled to car with RN and . Pt has possession of all belongings.

## 2024-02-10 NOTE — OR SURGEON
Immediate Post OP Note    PreOp Diagnosis: DUB      PostOp Diagnosis: DUB      Procedure(s):  DILATATION AND CURETTAGE WITH SUCTION - Wound Class: Clean Contaminated    Surgeon(s):  Yovany Parry M.D.    Anesthesiologist/Type of Anesthesia:  Anesthesiologist: Marv Pierre M.D./General    Surgical Staff:  Circulator: Lynn Dillon R.N.; Moisés Andujar R.N.  Scrub Person: Vane Vasquez    Specimens removed if any:  ID Type Source Tests Collected by Time Destination   A : ENDOMETRIAL CURETTINGS Other Other PATHOLOGY SPECIMEN Yovany Parry M.D. 2/9/2024  4:33 PM        Estimated Blood Loss: 30 cc    Findings: small uterine cavity, minimal endometrium    Complications: none        2/9/2024 4:46 PM Yovany Parry M.D.

## 2024-02-10 NOTE — OP REPORT
DATE OF SERVICE:  02/09/2024        PROCEDURE:  Cervical dilatation followed by suction and sharp uterine   curettage.     SURGEON:  Yovany Parry MD     ASSISTANT:  None.     ANESTHESIOLOGIST:  Marv Pierre MD     ANESTHESIA:  General, with laryngeal mask airway.     PREOPERATIVE DIAGNOSIS:  Dysfunctional uterine bleeding.     POSTOPERATIVE DIAGNOSIS:  Dysfunctional uterine bleeding.     COMPLICATIONS:  None.     ESTIMATED BLOOD LOSS:  30 mL.     SPECIMENS SENT TO PATHOLOGY:  Endometrial curettings.     INDICATIONS:  This 38-year-old lady has been suffering from relentless daily   vaginal bleeding for approximately one month.  We prescribed combination   oral contraceptive pills without improvement, and she has almost completed a   10-day course of Provera without improvement.  She states that she is still   having daily episodes of red vaginal bleeding.  I felt that I had no choice   but to recommend dilatation and curettage for definitive histologic assessment   of her endometrium (and hopefully a therapeutic benefit).     DESCRIPTION OF PROCEDURE:  The patient went to the OR.  General anesthesia was   administered.  We gently placed her lower legs in padded Vijay stirrups with   her thighs slightly flexed.  Bimanual exam under anesthesia revealed a small,   freely mobile anterior uterus with no adnexal masses.  There was only scant   blood present in her vaginal vault.  Her lower abdomen, vagina, thighs, and   buttocks were prepped with Betadine.  Sterile drapes were placed.  A timeout   was done.  I placed a weighted speculum vaginally.  I applied traction to the   anterior cervix with a tenaculum.  I injected 10 mL of 0.25% Marcaine with   epinephrine as a paracervical block.  I gently dilated the internal os. The   uterus sounded to 8.5 cm, anteverted.  I then progressively dilated the cervix     to 1 cm.  I introduced a 1 cm diameter suction curette until it was   flush with the uterine fundus.   Suction curettage was performed several   times, rotating the curette in both directions as I gradually withdrew it ,  directing it towards the both cornual areas.  There were only scant fragments   of endometrium passing through the suction tubing.  I then removed the suction   curette and performed sharp curettage, meticulously curetting all quadrants   of the uterine cavity including both cornual areas.  The uterine cavity was   smooth to palpation.  I obtained almost no tissue with the sharp curette.  I   made one more pass with the vacuum curette. No more tissue was obtained.  All   instruments were removed.  Sponge and needle counts were correct. The patient   is in the recovery room in satisfactory condition.        ______________________________  MD JORGE Mayfield/SARAH    DD:  02/09/2024 16:55  DT:  02/09/2024 17:16    Job#:  [id number]

## 2024-02-12 LAB — PATHOLOGY CONSULT NOTE: NORMAL

## 2024-02-20 DIAGNOSIS — M25.562 CHRONIC PAIN OF LEFT KNEE: ICD-10-CM

## 2024-02-20 DIAGNOSIS — M54.50 CHRONIC LOW BACK PAIN, UNSPECIFIED BACK PAIN LATERALITY, UNSPECIFIED WHETHER SCIATICA PRESENT: ICD-10-CM

## 2024-02-20 DIAGNOSIS — G89.29 CHRONIC LOW BACK PAIN, UNSPECIFIED BACK PAIN LATERALITY, UNSPECIFIED WHETHER SCIATICA PRESENT: ICD-10-CM

## 2024-02-20 DIAGNOSIS — G89.29 CHRONIC PAIN OF LEFT KNEE: ICD-10-CM

## 2024-02-21 RX ORDER — NORGESTIMATE AND ETHINYL ESTRADIOL 0.25-0.035
KIT ORAL
COMMUNITY
Start: 2023-12-18

## 2024-02-22 ENCOUNTER — TELEMEDICINE (OUTPATIENT)
Dept: MEDICAL GROUP | Facility: PHYSICIAN GROUP | Age: 39
End: 2024-02-22
Payer: COMMERCIAL

## 2024-02-22 VITALS — RESPIRATION RATE: 16 BRPM | HEIGHT: 65 IN | BODY MASS INDEX: 30.32 KG/M2 | WEIGHT: 182 LBS

## 2024-02-22 DIAGNOSIS — E55.9 VITAMIN D DEFICIENCY: ICD-10-CM

## 2024-02-22 DIAGNOSIS — R53.83 FATIGUE, UNSPECIFIED TYPE: ICD-10-CM

## 2024-02-22 DIAGNOSIS — R03.0 ELEVATED BLOOD PRESSURE READING: ICD-10-CM

## 2024-02-22 DIAGNOSIS — R63.5 WEIGHT GAIN: ICD-10-CM

## 2024-02-22 DIAGNOSIS — N93.8 DUB (DYSFUNCTIONAL UTERINE BLEEDING): ICD-10-CM

## 2024-02-22 DIAGNOSIS — R14.0 ABDOMINAL BLOATING: ICD-10-CM

## 2024-02-22 DIAGNOSIS — E78.5 DYSLIPIDEMIA: ICD-10-CM

## 2024-02-22 PROCEDURE — 99214 OFFICE O/P EST MOD 30 MIN: CPT | Mod: 95 | Performed by: PHYSICIAN ASSISTANT

## 2024-02-22 RX ORDER — DEXAMETHASONE 1 MG
TABLET ORAL
Qty: 1 TABLET | Refills: 0 | Status: SHIPPED | OUTPATIENT
Start: 2024-02-22

## 2024-02-22 ASSESSMENT — FIBROSIS 4 INDEX: FIB4 SCORE: 0.61

## 2024-02-22 NOTE — ASSESSMENT & PLAN NOTE
Chronic, uncontrolled.  Reports increase in weight from 75 kg to 81 kg after pregnancy.  Has been trying to change her eating habits but is still gaining weight.  Recommended looking into Weight [employer].  Checking dexamethasone suppression test.  Checking additional labs as well.

## 2024-02-22 NOTE — PROGRESS NOTES
Virtual Visit: Established Patient   This visit was conducted via Zoom using secure and encrypted videoconferencing technology.   The patient was in their home in the state of [place].    The patient's identity was confirmed and verbal consent was obtained for this virtual visit.     Subjective:   CC:   Chief Complaint   Patient presents with    Other     Hormone Check       Brooke Young is a 38 y.o. female presenting for evaluation and management of:    Problem   Elevated Blood Pressure Reading    Chronic, intermittent.  Elevated blood pressure readings after D&C 2/9/2024.  Has been running 130-160, SBP.     Weight Gain    Chronic, uncontrolled.  Reports the following weights:  65kg x 6 years  6 months before pregnancy, 72kg  92kg with pregnancy  75kg after pregnancy  81kg currently  Feels that she has changed her eating habits but is only gaining weight.  Very frustrated with her inability to lose weight.     Dub (Dysfunctional Uterine Bleeding)    Chronic, controlled.   Managed by OB/GYN.  2/9/2024, D&C.  Bleeding stopped.     Dyslipidemia    Chronic, uncontrolled.   Latest Labs:   Lab Results   Component Value Date/Time    CHOLSTRLTOT 179 02/08/2023 04:05 PM     (H) 02/08/2023 04:05 PM    HDL 47 02/08/2023 04:05 PM    TRIGLYCERIDE 136 02/08/2023 04:05 PM      Medications: none  Medication side effects:   Diet/Exercise:   Family History of high cholesterol or heart disease?   Risk calculator: The ASCVD Risk score (Patricia BRENDAN, et al., 2019) failed to calculate.        Vitamin D Deficiency    Chronic, uncontrolled.  Component      Latest Ref Rng 2/8/2023   25-Hydroxy Vitamin D 25      30 - 100 ng/mL 15 (L)    Starting 50K IU weekly.           ROS   Denies N/V/D    Current medicines (including changes today)  Current Outpatient Medications   Medication Sig Dispense Refill    dexamethasone (DECADRON) 1 MG Tab 1 one pill at 11pm. You will need to have your blood drawn the following day between  "8-10am, fasting. 1 Tablet 0    ESTARYLLA 0.25-35 MG-MCG per tablet  (Patient not taking: Reported on 2/22/2024)      medroxyPROGESTERone (PROVERA) 10 MG Tab Take 10 mg by mouth every day. (Patient not taking: Reported on 2/22/2024)      Multiple Vitamins-Minerals (ZINC PO) Take  by mouth. (Patient not taking: Reported on 2/22/2024)       No current facility-administered medications for this visit.       Patient Active Problem List    Diagnosis Date Noted    Elevated blood pressure reading 02/22/2024    Weight gain 02/22/2024    DUB (dysfunctional uterine bleeding) 02/09/2024    Acute exacerbation of chronic low back pain 12/11/2023    Wrist lump, right 12/11/2023    Skin lesion 11/27/2023    Chronic pain of left knee 11/27/2023    Overweight 07/05/2023    Dyslipidemia 02/09/2023    Vitamin D deficiency 02/09/2023    Post partum depression 01/24/2023    Snoring 09/27/2021        Objective:   Resp 16   Ht 1.651 m (5' 5\") Comment: per patient  Wt 82.6 kg (182 lb) Comment: per patient  LMP 01/11/2024 Comment: 1/11/24- bleeding for 30 days 2/9- surgery  BMI 30.29 kg/m²     Physical Exam:  Constitutional: Alert, no distress, well-groomed.  Skin: No rashes in visible areas.  Eye: Round. Conjunctiva clear, lids normal. No icterus.   ENMT: Lips pink without lesions, good dentition, moist mucous membranes. Phonation normal.  Neck: No masses, no thyromegaly. Moves freely without pain.  Respiratory: Unlabored respiratory effort, no cough or audible wheeze  Psych: Alert and oriented x3, normal affect and mood.     Assessment and Plan:   The following treatment plan was discussed:     Problem List Items Addressed This Visit       DUB (dysfunctional uterine bleeding)     Chronic, stable after D&C.         Dyslipidemia     Chronic, uncontrolled.  Mildly elevated LDL.  Due to repeat labs.         Relevant Orders    Lipid Profile    Elevated blood pressure reading     Chronic, control unknown.  Recommend following up in person to " check blood pressure.           Vitamin D deficiency     Chronic, control unknown.  Tolerating/continue 50,000 IU weekly.  Due to repeat labs.         Relevant Orders    VITAMIN D,25 HYDROXY (DEFICIENCY)    Weight gain     Chronic, uncontrolled.  Reports increase in weight from 75 kg to 81 kg after pregnancy.  Has been trying to change her eating habits but is still gaining weight.  Recommended looking into Weight Watchers.  Checking dexamethasone suppression test.  Checking additional labs as well.         Relevant Medications    dexamethasone (DECADRON) 1 MG Tab    Other Relevant Orders    Miscellaneous Test    TSH WITH REFLEX TO FT4     Other Visit Diagnoses       Abdominal bloating        Relevant Orders    CELIAC DISEASE AB PANEL    Fatigue, unspecified type        Relevant Orders    VITAMIN B12    CBC WITH DIFFERENTIAL    Comp Metabolic Panel                  Follow-up: Return for lab discussion.

## 2024-03-11 ENCOUNTER — APPOINTMENT (OUTPATIENT)
Dept: PHYSICAL THERAPY | Facility: REHABILITATION | Age: 39
End: 2024-03-11
Attending: PHYSICIAN ASSISTANT
Payer: COMMERCIAL

## 2024-03-18 ENCOUNTER — APPOINTMENT (OUTPATIENT)
Dept: PHYSICAL THERAPY | Facility: REHABILITATION | Age: 39
End: 2024-03-18
Attending: PHYSICIAN ASSISTANT
Payer: COMMERCIAL

## 2024-03-25 ENCOUNTER — APPOINTMENT (OUTPATIENT)
Dept: PHYSICAL THERAPY | Facility: REHABILITATION | Age: 39
End: 2024-03-25
Attending: PHYSICIAN ASSISTANT
Payer: COMMERCIAL

## 2024-03-28 ENCOUNTER — OFFICE VISIT (OUTPATIENT)
Dept: URGENT CARE | Facility: CLINIC | Age: 39
End: 2024-03-28
Payer: COMMERCIAL

## 2024-03-28 VITALS
RESPIRATION RATE: 16 BRPM | SYSTOLIC BLOOD PRESSURE: 116 MMHG | DIASTOLIC BLOOD PRESSURE: 66 MMHG | BODY MASS INDEX: 30.74 KG/M2 | HEART RATE: 102 BPM | WEIGHT: 184.53 LBS | OXYGEN SATURATION: 95 % | TEMPERATURE: 98.7 F | HEIGHT: 65 IN

## 2024-03-28 DIAGNOSIS — L30.9 ECZEMA OF BOTH HANDS: ICD-10-CM

## 2024-03-28 DIAGNOSIS — J02.9 SORE THROAT: ICD-10-CM

## 2024-03-28 DIAGNOSIS — R06.2 WHEEZING: ICD-10-CM

## 2024-03-28 DIAGNOSIS — M79.10 MYALGIA: ICD-10-CM

## 2024-03-28 DIAGNOSIS — J06.9 VIRAL URI WITH COUGH: ICD-10-CM

## 2024-03-28 DIAGNOSIS — R05.1 ACUTE COUGH: ICD-10-CM

## 2024-03-28 LAB
FLUAV RNA SPEC QL NAA+PROBE: NEGATIVE
FLUBV RNA SPEC QL NAA+PROBE: NEGATIVE
RSV RNA SPEC QL NAA+PROBE: NEGATIVE
S PYO DNA SPEC NAA+PROBE: NOT DETECTED
SARS-COV-2 RNA RESP QL NAA+PROBE: NEGATIVE

## 2024-03-28 PROCEDURE — 0241U POCT CEPHEID COV-2, FLU A/B, RSV - PCR: CPT | Performed by: NURSE PRACTITIONER

## 2024-03-28 PROCEDURE — 3078F DIAST BP <80 MM HG: CPT | Performed by: NURSE PRACTITIONER

## 2024-03-28 PROCEDURE — 99214 OFFICE O/P EST MOD 30 MIN: CPT | Performed by: NURSE PRACTITIONER

## 2024-03-28 PROCEDURE — 87651 STREP A DNA AMP PROBE: CPT | Performed by: NURSE PRACTITIONER

## 2024-03-28 PROCEDURE — 3074F SYST BP LT 130 MM HG: CPT | Performed by: NURSE PRACTITIONER

## 2024-03-28 PROCEDURE — 94640 AIRWAY INHALATION TREATMENT: CPT | Performed by: NURSE PRACTITIONER

## 2024-03-28 RX ORDER — ALBUTEROL SULFATE 90 UG/1
2 AEROSOL, METERED RESPIRATORY (INHALATION) EVERY 6 HOURS PRN
Qty: 8.5 G | Refills: 0 | Status: SHIPPED | OUTPATIENT
Start: 2024-03-28

## 2024-03-28 RX ORDER — IPRATROPIUM BROMIDE AND ALBUTEROL SULFATE 2.5; .5 MG/3ML; MG/3ML
3 SOLUTION RESPIRATORY (INHALATION) ONCE
Status: COMPLETED | OUTPATIENT
Start: 2024-03-28 | End: 2024-03-28

## 2024-03-28 RX ORDER — TRIAMCINOLONE ACETONIDE 0.25 MG/G
0.03 CREAM TOPICAL 2 TIMES DAILY
Qty: 15 G | Refills: 0 | Status: SHIPPED | OUTPATIENT
Start: 2024-03-28 | End: 2024-04-04

## 2024-03-28 RX ORDER — BENZONATATE 100 MG/1
100 CAPSULE ORAL 3 TIMES DAILY PRN
Qty: 30 CAPSULE | Refills: 0 | Status: SHIPPED | OUTPATIENT
Start: 2024-03-28

## 2024-03-28 RX ADMIN — IPRATROPIUM BROMIDE AND ALBUTEROL SULFATE 3 ML: 2.5; .5 SOLUTION RESPIRATORY (INHALATION) at 09:00

## 2024-03-28 ASSESSMENT — FIBROSIS 4 INDEX: FIB4 SCORE: 0.61

## 2024-03-28 NOTE — PROGRESS NOTES
"Subjective:   Brooke Young is a 38 y.o. female who presents for Cough and Nasal Congestion       HPI  Patient is a pleasant 38 y.o. who presents for evaluation of 3 to 4-day history of nasal congestion, frequent cough, fatigue, and myalgia.  Patient has taken TheraFlu without noticing relief of her symptoms.  She is unaware of any specific known ill contacts or exposures, however works in a large factory.  Additionally, she has a long time history of eczema involving her hands, would like to have prescription crea to help promote healing.    ROS  All other systems are negative except as documented above within HPI.    MEDS:   Current Outpatient Medications:     dexamethasone (DECADRON) 1 MG Tab, 1 one pill at 11pm. You will need to have your blood drawn the following day between 8-10am, fasting. (Patient not taking: Reported on 3/28/2024), Disp: 1 Tablet, Rfl: 0    ESTARYLLA 0.25-35 MG-MCG per tablet, , Disp: , Rfl:     medroxyPROGESTERone (PROVERA) 10 MG Tab, Take 10 mg by mouth every day. (Patient not taking: Reported on 2/22/2024), Disp: , Rfl:     Multiple Vitamins-Minerals (ZINC PO), Take  by mouth. (Patient not taking: Reported on 2/22/2024), Disp: , Rfl:   ALLERGIES:   Allergies   Allergen Reactions    Strawberry Rash       Patient's PMH, SocHx, SurgHx, FamHx, Drug allergies and medications were reviewed.     Objective:   /66   Pulse (!) 102   Temp 37.1 °C (98.7 °F) (Temporal)   Resp 16   Ht 1.651 m (5' 5\")   Wt 83.7 kg (184 lb 8.4 oz)   SpO2 95%   BMI 30.71 kg/m²     Physical Exam  Vitals and nursing note reviewed.   Constitutional:       General: She is awake.      Appearance: Normal appearance. She is well-developed.   HENT:      Head: Normocephalic and atraumatic.      Right Ear: Tympanic membrane, ear canal and external ear normal.      Left Ear: Tympanic membrane, ear canal and external ear normal.      Nose: Nose normal. No nasal tenderness, mucosal edema, congestion or " rhinorrhea.      Right Turbinates: Enlarged.      Left Turbinates: Enlarged.      Mouth/Throat:      Lips: Pink.      Mouth: Mucous membranes are moist.      Pharynx: Oropharynx is clear. Uvula midline. Posterior oropharyngeal erythema present.   Eyes:      Extraocular Movements: Extraocular movements intact.      Conjunctiva/sclera: Conjunctivae normal.   Cardiovascular:      Rate and Rhythm: Normal rate and regular rhythm.      Pulses: Normal pulses.      Heart sounds: Normal heart sounds.   Pulmonary:      Effort: Pulmonary effort is normal.      Breath sounds: Wheezing present.      Comments: +freq dry cough  Musculoskeletal:         General: Normal range of motion.        Hands:       Cervical back: Normal range of motion and neck supple.      Comments: Dry, cracked skin, mild erythema   Skin:     General: Skin is warm and dry.   Neurological:      General: No focal deficit present.      Mental Status: She is alert and oriented to person, place, and time.   Psychiatric:         Mood and Affect: Mood normal.         Behavior: Behavior normal. Behavior is cooperative.         Thought Content: Thought content normal.         Judgment: Judgment normal.         Assessment/Plan:   Assessment    1. Viral URI with cough    2. Eczema of both hands  - triamcinolone acetonide (KENALOG) 0.025 % Cream; Apply 0.025 g topically 2 times a day for 7 days.  Dispense: 15 g; Refill: 0    3. Acute cough  - ipratropium-albuterol (DUONEB) nebulizer solution  - POCT CEPHEID COV-2, FLU A/B, RSV - PCR  - POCT CEPHEID GROUP A STREP - PCR  - albuterol 108 (90 Base) MCG/ACT Aero Soln inhalation aerosol; Inhale 2 Puffs every 6 hours as needed for Shortness of Breath.  Dispense: 8.5 g; Refill: 0  - benzonatate (TESSALON) 100 MG Cap; Take 1 Capsule by mouth 3 times a day as needed for Cough.  Dispense: 30 Capsule; Refill: 0    4. Wheezing  - ipratropium-albuterol (DUONEB) nebulizer solution  - POCT CEPHEID COV-2, FLU A/B, RSV - PCR  - POCT  CEPHEID GROUP A STREP - PCR  - albuterol 108 (90 Base) MCG/ACT Aero Soln inhalation aerosol; Inhale 2 Puffs every 6 hours as needed for Shortness of Breath.  Dispense: 8.5 g; Refill: 0    5. Sore throat  - POCT CEPHEID COV-2, FLU A/B, RSV - PCR  - POCT CEPHEID GROUP A STREP - PCR    6. Myalgia  - POCT CEPHEID COV-2, FLU A/B, RSV - PCR  - POCT CEPHEID GROUP A STREP - PCR      Vital signs stable at today's acute urgent care visit.  POCT testing negative.  Begin medications as listed. Recommend continue over-the-counter cough and cold medication as needed for symptomatic relief.    Advised the patient to follow-up with the primary care provider if symptoms persist.  Red flags discussed and indications to immediately call 911 or present to the ED. All questions were encouraged and answered to the patient's satisfaction and understanding, and they agree to the plan of care.     This is an acute problem with uncertain prognosis, medication management and instructions as well as management options were provided.  I personally reviewed prior external notes and test results pertinent to today and independently reviewed and interpreted all diagnostics, to include POCT testing. Time spent evaluating this patient includes preparing for visit, counseling/education, exam, evaluation, obtaining history, and ordering lab/test/procedures.      Please note that this dictation was created using voice recognition software. I have made a reasonable attempt to correct obvious errors, but I expect that there are errors of grammar and possibly content that I did not discover before finalizing the note.

## 2024-03-28 NOTE — LETTER
March 28, 2024    To Whom It May Concern:         This is confirmation that Brooke rimargotchan Skleolafede attended her scheduled appointment with MARISA Haq on 3/28/24.  Please excuse her from any missed work today for an evaluation due to an acute respiratory illness.         If you have any questions please do not hesitate to call me at the phone number listed below.    Sincerely,          EARNESTINE HaqRDelisaN.  [phone]

## 2024-04-01 ENCOUNTER — APPOINTMENT (OUTPATIENT)
Dept: PHYSICAL THERAPY | Facility: REHABILITATION | Age: 39
End: 2024-04-01
Attending: PHYSICIAN ASSISTANT
Payer: COMMERCIAL

## 2024-04-08 ENCOUNTER — APPOINTMENT (OUTPATIENT)
Dept: PHYSICAL THERAPY | Facility: REHABILITATION | Age: 39
End: 2024-04-08
Attending: PHYSICIAN ASSISTANT
Payer: COMMERCIAL

## 2024-04-15 ENCOUNTER — APPOINTMENT (OUTPATIENT)
Dept: PHYSICAL THERAPY | Facility: REHABILITATION | Age: 39
End: 2024-04-15
Attending: PHYSICIAN ASSISTANT
Payer: COMMERCIAL

## 2024-04-22 ENCOUNTER — APPOINTMENT (OUTPATIENT)
Dept: PHYSICAL THERAPY | Facility: REHABILITATION | Age: 39
End: 2024-04-22
Attending: PHYSICIAN ASSISTANT
Payer: COMMERCIAL

## 2025-03-17 ENCOUNTER — HOSPITAL ENCOUNTER (OUTPATIENT)
Facility: MEDICAL CENTER | Age: 40
End: 2025-03-17
Attending: OBSTETRICS & GYNECOLOGY
Payer: COMMERCIAL

## 2025-03-17 PROCEDURE — 88142 CYTOPATH C/V THIN LAYER: CPT

## 2025-03-17 PROCEDURE — 87624 HPV HI-RISK TYP POOLED RSLT: CPT

## 2025-03-23 LAB
HPV I/H RISK 1 DNA SPEC QL NAA+PROBE: NOT DETECTED
SPECIMEN SOURCE: NORMAL
THINPREP PAP, CYTOLOGY NL11781: NORMAL

## 2025-05-09 ENCOUNTER — OFFICE VISIT (OUTPATIENT)
Dept: MEDICAL GROUP | Facility: PHYSICIAN GROUP | Age: 40
End: 2025-05-09
Payer: COMMERCIAL

## 2025-05-09 VITALS
RESPIRATION RATE: 18 BRPM | SYSTOLIC BLOOD PRESSURE: 118 MMHG | TEMPERATURE: 98.2 F | DIASTOLIC BLOOD PRESSURE: 68 MMHG | WEIGHT: 169.5 LBS | HEART RATE: 90 BPM | OXYGEN SATURATION: 98 % | HEIGHT: 65 IN | BODY MASS INDEX: 28.24 KG/M2

## 2025-05-09 DIAGNOSIS — N93.8 DUB (DYSFUNCTIONAL UTERINE BLEEDING): ICD-10-CM

## 2025-05-09 DIAGNOSIS — H62.43 OTOMYCOSIS OF BOTH EARS: ICD-10-CM

## 2025-05-09 DIAGNOSIS — B36.9 OTOMYCOSIS OF BOTH EARS: ICD-10-CM

## 2025-05-09 DIAGNOSIS — E55.9 VITAMIN D DEFICIENCY: ICD-10-CM

## 2025-05-09 DIAGNOSIS — L65.9 HAIR LOSS: ICD-10-CM

## 2025-05-09 DIAGNOSIS — E66.3 OVERWEIGHT: ICD-10-CM

## 2025-05-09 DIAGNOSIS — R22.31 MASS OF WRIST, RIGHT: ICD-10-CM

## 2025-05-09 DIAGNOSIS — E78.5 DYSLIPIDEMIA: ICD-10-CM

## 2025-05-09 DIAGNOSIS — D50.9 IRON DEFICIENCY ANEMIA, UNSPECIFIED IRON DEFICIENCY ANEMIA TYPE: ICD-10-CM

## 2025-05-09 DIAGNOSIS — E53.8 VITAMIN B12 DEFICIENCY: ICD-10-CM

## 2025-05-09 DIAGNOSIS — R63.5 WEIGHT GAIN: ICD-10-CM

## 2025-05-09 PROCEDURE — 3078F DIAST BP <80 MM HG: CPT

## 2025-05-09 PROCEDURE — 3074F SYST BP LT 130 MM HG: CPT

## 2025-05-09 PROCEDURE — 99214 OFFICE O/P EST MOD 30 MIN: CPT

## 2025-05-09 RX ORDER — PROGESTERONE 100 MG/1
CAPSULE ORAL
COMMUNITY
Start: 2025-05-01

## 2025-05-09 RX ORDER — CLOTRIMAZOLE 1 G/ML
1 SOLUTION TOPICAL 2 TIMES DAILY
Qty: 29.57 ML | Refills: 0 | Status: SHIPPED | OUTPATIENT
Start: 2025-05-09 | End: 2025-05-23

## 2025-05-09 ASSESSMENT — FIBROSIS 4 INDEX: FIB4 SCORE: 0.64

## 2025-05-09 NOTE — PROGRESS NOTES
Subjective:     Chief Complaint   Patient presents with    Requesting Labs    Otalgia     History of Present Illness  The patient is a 40-year-old female established with Mana Richmond PA-C presenting today for ear pain, hair loss, and a cyst on the right wrist.    She has been experiencing recurrent left ear infections approximately every 2 months for the past 1.5 years, which have resulted in hearing loss and black discharge upon cleaning. She also reports mild pain in her right ear. She has not consulted an ENT specialist recently. She has been prescribed antibiotics for these infections, but they have not been effective.    She has been experiencing hair loss, which she suspects may be related to her hormone replacement therapy. She has not discussed this issue with her hormone specialist. She is currently taking vitamin B complex, iron, and fish oil supplements.    She has a painful cyst on her right wrist that has been present for 4 months. 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 She also has to lift her baby, which exacerbates the pain. She is aware that surgical removal is necessary and is seeking a referral to a specialist. She recalls having a small bump on her wrist as a teenager, which resolved on its own.    She has noticed a change in her gait with age, accompanied by pain and a cracking sound on one side. She suspects a possible bone or hip issue.    She is under the care of Dr. Parry for hormone replacement therapy, with her last consultation being a month ago. She was previously on estrogen pills for a few months last year, which normalized her menstrual cycle, but had to discontinue due to side effects when combined with another hormone. She experienced high blood pressure and other symptoms. She was then prescribed Prometrium 100 mg, which balanced her menstrual cycle. However, in 02/2025, she had an irregular cycle, with one month of amenorrhea followed by a  "20-day period. A month ago, she reported feeling well without the medication, but two weeks later, her menstrual cycle became irregular again. She underwent a D and C procedure and had a prolonged period from 04/13/2025 to 04/29/2025. She was prescribed Prometrium again and is currently on day 8 of a 10-day course.    FAMILY HISTORY  Her mother passed away at 46 from an autoimmune disease.  Her father passed away at 58 and had Parkinson's, high blood pressure, heart issues, and bone issues.  Her sister had an ovary removed at 8 years old.    Allergies: [place]  ROS per HPI  Health Maintenance: Completed   Objective:     /68 (BP Location: Left arm, Patient Position: Sitting, BP Cuff Size: Adult)   Pulse 90   Temp 36.8 °C (98.2 °F) (Temporal)   Resp 18   Ht 1.651 m (5' 5\")   Wt 76.9 kg (169 lb 8 oz)   LMP 04/13/2025   SpO2 98%   Breastfeeding No   BMI 28.21 kg/m²  Body mass index is 28.21 kg/m².     Physical Exam  Vitals reviewed.   Constitutional:       Appearance: Normal appearance.   HENT:      Right Ear: Decreased hearing noted. Drainage and tenderness present. No mastoid tenderness. Tympanic membrane is not erythematous.      Left Ear: Decreased hearing noted. Drainage and tenderness present. No mastoid tenderness. Tympanic membrane is not erythematous.      Ears:      Comments: Unable to visualize TM bilaterally completely, bilateral ear canals demonstrate white-yellow drainage      Nose: No congestion or rhinorrhea.      Right Turbinates: Not enlarged or swollen.      Left Turbinates: Not enlarged or swollen.      Right Sinus: No maxillary sinus tenderness or frontal sinus tenderness.      Left Sinus: No maxillary sinus tenderness or frontal sinus tenderness.      Mouth/Throat:      Lips: Pink.      Mouth: Mucous membranes are moist.      Pharynx: Oropharynx is clear.   Musculoskeletal:      Right wrist: Deformity (mass to dorsal aspect) and tenderness present. No swelling, effusion, " lacerations, bony tenderness, snuff box tenderness or crepitus. Decreased range of motion. Normal pulse.      Left wrist: Normal.      Right hand: Normal.      Left hand: Normal.      Cervical back: Neck supple.   Lymphadenopathy:      Cervical: No cervical adenopathy.   Neurological:      Mental Status: She is alert.       Results for orders placed or performed during the hospital encounter of 03/17/25   THINPREP PAP WITH HPV    Collection Time: 03/17/25  1:59 PM   Result Value Ref Range    ThinPrep Pap, Cytology SEE BELOW    HPV HIGH-RISK W/RFLX GENOTYPE    Collection Time: 03/17/25  1:59 PM   Result Value Ref Range    Source Cerv/Endocerv     HPV by PCR ThinPrep Not Detected       Assessment and Plan:     The following treatment plan was discussed through shared decision making with the patient:    1. Otomycosis of both ears  clotrimazole (LOTRIMIN) 1 % Solution      2. Mass of wrist, right  Referral to General Surgery    US-EXTREMITY NON VASCULAR BILATERAL      3. Dyslipidemia  Comp Metabolic Panel    Lipid Profile    TSH WITH REFLEX TO FT4      4. Vitamin D deficiency  VITAMIN D,25 HYDROXY (DEFICIENCY)      5. Overweight        6. Weight gain  Comp Metabolic Panel    HEMOGLOBIN A1C    Lipid Profile    TSH WITH REFLEX TO FT4      7. DUB (dysfunctional uterine bleeding)  CBC WITH DIFFERENTIAL    Comp Metabolic Panel      8. Hair loss        9. Vitamin B12 deficiency  VITAMIN B12      10. Iron deficiency anemia, unspecified iron deficiency anemia type  IRON/TOTAL IRON BIND    FERRITIN        Assessment & Plan  1. Otomycosis externa of bilateral ears.  - Symptoms suggest a potential yeast infection in the ears, likely due to excessive moisture.  - Examination revealed a possible ruptured eardrum and yeast infection.  - A prescription for clotrimazole otic 1% solution has been provided, with instructions to administer 1 drop in each ear twice daily for a duration of 14 days.  - A referral to an Ear, Nose, and Throat  (ENT) specialist has been made for further evaluation.    2. Hair loss.  - The hair loss could be attributed to hormonal fluctuations.  - Baseline labs will be ordered to assess thyroid function, vitamin D levels, complete blood count (CBC), iron levels, and B12 levels.  - Discussion included the potential connection between hair loss and hormone levels, and the need for baseline labs to guide further testing.  - She has been advised to follow up with her primary care provider, Mansi, for further evaluation and management based on the results of these baseline labs.    3. Cyst on the right wrist.  - The patient reports a painful cyst on the right wrist, present for approximately 4 months.  - Physical examination confirmed the presence of the cyst.  - An ultrasound of the right wrist will be ordered to evaluate the cyst.  - A referral to a surgeon has been made for potential surgical intervention.      Return in about 2 weeks (around 5/23/2025) for Labs with PCP.       Please note that this note was created using dictation with voice recognition software. I have made every reasonable attempt to correct obvious errors, but I expect that there are errors of grammar and possibly content that I did not discover before finalizing the note.    CHANTELL Hoyos  [facility]  [facility]

## 2025-05-10 ENCOUNTER — HOSPITAL ENCOUNTER (OUTPATIENT)
Dept: LAB | Facility: MEDICAL CENTER | Age: 40
End: 2025-05-10
Payer: COMMERCIAL

## 2025-05-10 DIAGNOSIS — E78.5 DYSLIPIDEMIA: ICD-10-CM

## 2025-05-10 DIAGNOSIS — N93.8 DUB (DYSFUNCTIONAL UTERINE BLEEDING): ICD-10-CM

## 2025-05-10 DIAGNOSIS — D50.9 IRON DEFICIENCY ANEMIA, UNSPECIFIED IRON DEFICIENCY ANEMIA TYPE: ICD-10-CM

## 2025-05-10 DIAGNOSIS — R63.5 WEIGHT GAIN: ICD-10-CM

## 2025-05-10 DIAGNOSIS — E53.8 VITAMIN B12 DEFICIENCY: ICD-10-CM

## 2025-05-10 DIAGNOSIS — E55.9 VITAMIN D DEFICIENCY: ICD-10-CM

## 2025-05-10 LAB
25(OH)D3 SERPL-MCNC: 23 NG/ML (ref 30–100)
ALBUMIN SERPL BCP-MCNC: 4.5 G/DL (ref 3.2–4.9)
ALBUMIN/GLOB SERPL: 1.4 G/DL
ALP SERPL-CCNC: 78 U/L (ref 30–99)
ALT SERPL-CCNC: 21 U/L (ref 2–50)
ANION GAP SERPL CALC-SCNC: 11 MMOL/L (ref 7–16)
AST SERPL-CCNC: 23 U/L (ref 12–45)
BASOPHILS # BLD AUTO: 0.8 % (ref 0–1.8)
BASOPHILS # BLD: 0.05 K/UL (ref 0–0.12)
BILIRUB SERPL-MCNC: 0.6 MG/DL (ref 0.1–1.5)
BUN SERPL-MCNC: 15 MG/DL (ref 8–22)
CALCIUM ALBUM COR SERPL-MCNC: 9 MG/DL (ref 8.5–10.5)
CALCIUM SERPL-MCNC: 9.4 MG/DL (ref 8.5–10.5)
CHLORIDE SERPL-SCNC: 107 MMOL/L (ref 96–112)
CHOLEST SERPL-MCNC: 147 MG/DL (ref 100–199)
CO2 SERPL-SCNC: 24 MMOL/L (ref 20–33)
CREAT SERPL-MCNC: 0.84 MG/DL (ref 0.5–1.4)
EOSINOPHIL # BLD AUTO: 0.32 K/UL (ref 0–0.51)
EOSINOPHIL NFR BLD: 4.9 % (ref 0–6.9)
ERYTHROCYTE [DISTWIDTH] IN BLOOD BY AUTOMATED COUNT: 43.1 FL (ref 35.9–50)
EST. AVERAGE GLUCOSE BLD GHB EST-MCNC: 108 MG/DL
FASTING STATUS PATIENT QL REPORTED: NORMAL
FERRITIN SERPL-MCNC: 10 NG/ML (ref 10–291)
GFR SERPLBLD CREATININE-BSD FMLA CKD-EPI: 90 ML/MIN/1.73 M 2
GLOBULIN SER CALC-MCNC: 3.3 G/DL (ref 1.9–3.5)
GLUCOSE SERPL-MCNC: 90 MG/DL (ref 65–99)
HBA1C MFR BLD: 5.4 % (ref 4–5.6)
HCT VFR BLD AUTO: 36.3 % (ref 37–47)
HDLC SERPL-MCNC: 48 MG/DL
HGB BLD-MCNC: 11.2 G/DL (ref 12–16)
IMM GRANULOCYTES # BLD AUTO: 0.01 K/UL (ref 0–0.11)
IMM GRANULOCYTES NFR BLD AUTO: 0.2 % (ref 0–0.9)
IRON SATN MFR SERPL: 11 % (ref 15–55)
IRON SERPL-MCNC: 44 UG/DL (ref 40–170)
LDLC SERPL CALC-MCNC: 85 MG/DL
LYMPHOCYTES # BLD AUTO: 1.53 K/UL (ref 1–4.8)
LYMPHOCYTES NFR BLD: 23.4 % (ref 22–41)
MCH RBC QN AUTO: 25.4 PG (ref 27–33)
MCHC RBC AUTO-ENTMCNC: 30.9 G/DL (ref 32.2–35.5)
MCV RBC AUTO: 82.3 FL (ref 81.4–97.8)
MONOCYTES # BLD AUTO: 0.65 K/UL (ref 0–0.85)
MONOCYTES NFR BLD AUTO: 9.9 % (ref 0–13.4)
NEUTROPHILS # BLD AUTO: 3.98 K/UL (ref 1.82–7.42)
NEUTROPHILS NFR BLD: 60.8 % (ref 44–72)
NRBC # BLD AUTO: 0 K/UL
NRBC BLD-RTO: 0 /100 WBC (ref 0–0.2)
PLATELET # BLD AUTO: 310 K/UL (ref 164–446)
PMV BLD AUTO: 11.2 FL (ref 9–12.9)
POTASSIUM SERPL-SCNC: 4.3 MMOL/L (ref 3.6–5.5)
PROT SERPL-MCNC: 7.8 G/DL (ref 6–8.2)
RBC # BLD AUTO: 4.41 M/UL (ref 4.2–5.4)
SODIUM SERPL-SCNC: 142 MMOL/L (ref 135–145)
TIBC SERPL-MCNC: 394 UG/DL (ref 250–450)
TRIGL SERPL-MCNC: 69 MG/DL (ref 0–149)
TSH SERPL DL<=0.005 MIU/L-ACNC: 1.71 UIU/ML (ref 0.38–5.33)
UIBC SERPL-MCNC: 350 UG/DL (ref 110–370)
VIT B12 SERPL-MCNC: 631 PG/ML (ref 211–911)
WBC # BLD AUTO: 6.5 K/UL (ref 4.8–10.8)

## 2025-05-10 PROCEDURE — 82728 ASSAY OF FERRITIN: CPT

## 2025-05-10 PROCEDURE — 36415 COLL VENOUS BLD VENIPUNCTURE: CPT

## 2025-05-10 PROCEDURE — 83550 IRON BINDING TEST: CPT

## 2025-05-10 PROCEDURE — 82306 VITAMIN D 25 HYDROXY: CPT

## 2025-05-10 PROCEDURE — 85025 COMPLETE CBC W/AUTO DIFF WBC: CPT

## 2025-05-10 PROCEDURE — 80053 COMPREHEN METABOLIC PANEL: CPT

## 2025-05-10 PROCEDURE — 80061 LIPID PANEL: CPT

## 2025-05-10 PROCEDURE — 83540 ASSAY OF IRON: CPT

## 2025-05-10 PROCEDURE — 84443 ASSAY THYROID STIM HORMONE: CPT

## 2025-05-10 PROCEDURE — 82607 VITAMIN B-12: CPT

## 2025-05-10 PROCEDURE — 83036 HEMOGLOBIN GLYCOSYLATED A1C: CPT

## 2025-05-15 NOTE — Clinical Note
REFERRAL APPROVAL NOTICE         Sent on May 15, 2025                   Brooke Young  [address]  [place] [address]                   Dear Ms. Young,    After a careful review of the medical information and benefit coverage, [facility] has processed your referral. See below for additional details.    If applicable, you must be actively enrolled with your insurance for coverage of the authorized service. If you have any questions regarding your coverage, please contact your insurance directly.    REFERRAL INFORMATION   Referral #:  [id number]  Referred-To Department    Referred-By Provider:  General Surgery    MARISA Hoyos   Department Of Surgery      [address]  [place] [id number] [phone] [address] [place] [address], [address]  [place] [id number] [phone]    Referral Start Date:  05/09/2025  Referral End Date:   05/09/2026             SCHEDULING  If you do not already have an appointment, please call [phone] to make an appointment.     MORE INFORMATION  If you do not already have a [employer] account, sign up at: [email or web address]  You can access your medical information, make appointments, see lab results, billing information, and more.  If you have questions regarding this referral, please contact  the [facility] Referrals department at:             [phone]. Monday - Friday 8:00AM - 5:00PM.     Sincerely,    [facility]

## 2025-05-20 ENCOUNTER — OFFICE VISIT (OUTPATIENT)
Dept: MEDICAL GROUP | Facility: PHYSICIAN GROUP | Age: 40
End: 2025-05-20
Payer: COMMERCIAL

## 2025-05-20 VITALS
DIASTOLIC BLOOD PRESSURE: 72 MMHG | OXYGEN SATURATION: 97 % | HEART RATE: 93 BPM | BODY MASS INDEX: 28.84 KG/M2 | TEMPERATURE: 98.6 F | HEIGHT: 65 IN | SYSTOLIC BLOOD PRESSURE: 104 MMHG | WEIGHT: 173.13 LBS | RESPIRATION RATE: 16 BRPM

## 2025-05-20 DIAGNOSIS — R63.5 WEIGHT GAIN: ICD-10-CM

## 2025-05-20 DIAGNOSIS — M54.50 ACUTE EXACERBATION OF CHRONIC LOW BACK PAIN: Primary | ICD-10-CM

## 2025-05-20 DIAGNOSIS — R06.89 BREATHING DIFFICULTY: ICD-10-CM

## 2025-05-20 DIAGNOSIS — M25.562 CHRONIC PAIN OF LEFT KNEE: ICD-10-CM

## 2025-05-20 DIAGNOSIS — G89.29 CHRONIC PAIN OF LEFT KNEE: ICD-10-CM

## 2025-05-20 DIAGNOSIS — E55.9 VITAMIN D DEFICIENCY: ICD-10-CM

## 2025-05-20 DIAGNOSIS — N93.8 DUB (DYSFUNCTIONAL UTERINE BLEEDING): ICD-10-CM

## 2025-05-20 DIAGNOSIS — R79.0 LOW IRON STORES: ICD-10-CM

## 2025-05-20 DIAGNOSIS — G89.29 ACUTE EXACERBATION OF CHRONIC LOW BACK PAIN: Primary | ICD-10-CM

## 2025-05-20 DIAGNOSIS — E66.3 OVERWEIGHT: ICD-10-CM

## 2025-05-20 DIAGNOSIS — L98.9 SKIN LESION: ICD-10-CM

## 2025-05-20 DIAGNOSIS — E78.5 DYSLIPIDEMIA: ICD-10-CM

## 2025-05-20 DIAGNOSIS — R22.31 WRIST LUMP, RIGHT: ICD-10-CM

## 2025-05-20 DIAGNOSIS — R53.83 FATIGUE, UNSPECIFIED TYPE: ICD-10-CM

## 2025-05-20 DIAGNOSIS — R03.0 ELEVATED BLOOD PRESSURE READING: ICD-10-CM

## 2025-05-20 DIAGNOSIS — Z12.31 ENCOUNTER FOR SCREENING MAMMOGRAM FOR MALIGNANT NEOPLASM OF BREAST: ICD-10-CM

## 2025-05-20 PROCEDURE — 3074F SYST BP LT 130 MM HG: CPT | Performed by: PHYSICIAN ASSISTANT

## 2025-05-20 PROCEDURE — 99214 OFFICE O/P EST MOD 30 MIN: CPT | Performed by: PHYSICIAN ASSISTANT

## 2025-05-20 PROCEDURE — 3078F DIAST BP <80 MM HG: CPT | Performed by: PHYSICIAN ASSISTANT

## 2025-05-20 RX ORDER — FERROUS SULFATE 325(65) MG
325 TABLET ORAL
COMMUNITY

## 2025-05-20 RX ORDER — VENLAFAXINE HYDROCHLORIDE 37.5 MG/1
37.5 CAPSULE, EXTENDED RELEASE ORAL DAILY
Qty: 90 CAPSULE | Refills: 1 | Status: SHIPPED | OUTPATIENT
Start: 2025-05-20

## 2025-05-20 ASSESSMENT — ENCOUNTER SYMPTOMS
FEVER: 0
SHORTNESS OF BREATH: 0
CHILLS: 0

## 2025-05-20 ASSESSMENT — FIBROSIS 4 INDEX: FIB4 SCORE: 0.65

## 2025-05-20 NOTE — ASSESSMENT & PLAN NOTE
Interval history 2/2024:  Chronic, uncontrolled.  Reports the following weights:  65kg x 6 years  6 months before pregnancy, 72kg  92kg with pregnancy  75kg after pregnancy  81kg currently  Feels that she has changed her eating habits but is only gaining weight.  Very frustrated with her inability to lose weight.

## 2025-05-20 NOTE — ASSESSMENT & PLAN NOTE
Chronic, controlled.   Managed by OB/GYN.  2/9/2024, D&C.  Bleeding stopped.  OB/GYN put her on progesterone 100mg day 1-10 of the month.  Estrogen caused a huge increase in her BP apparently.

## 2025-05-20 NOTE — PATIENT INSTRUCTIONS
Ferrous sulfate works the best but is the least tolerated (abdominal pain/discomfort, constipation).  Start with 1 tab daily and increase up to 3 times daily as tolerated. Any brand will do.      Ferrous gluconate is better tolerated than ferrous sulfate. Any brand will do.      Floradix is a specific brand that is liquid and is the best tolerated. Follow the bottle directions.

## 2025-05-20 NOTE — ASSESSMENT & PLAN NOTE
Chronic, ongoing.  Iron 44 (5/2025).  Currently taking 2 over-the-counter iron supplements daily.  Recommend increasing to 3 times daily.

## 2025-05-20 NOTE — ASSESSMENT & PLAN NOTE
Interval history 12/2023:  Chronic, uncontrolled.  Had issues prior to pregnancy.  Has issues bending over --> hard to stand back up.  Often needs assistance getting upright.  Denies radiating pain, saddle anesthesia, bowel/bladder changes, extremity weakness.  Referring to PT.

## 2025-05-20 NOTE — ASSESSMENT & PLAN NOTE
Interval history 11/2023:  Chronic, uncontrolled.  Pain in the posterior left knee for the past 6 months.  Worse with ambulation.  Denies any swelling or rashes  Negative US.  Referring to orthopedics.

## 2025-05-20 NOTE — ASSESSMENT & PLAN NOTE
Chronic, stable.  Diet controlled.    Latest Labs:   Lab Results   Component Value Date/Time    CHOLSTRLTOT 147 05/10/2025 11:49 AM    LDL 85 05/10/2025 11:49 AM    HDL 48 05/10/2025 11:49 AM    TRIGLYCERIDE 69 05/10/2025 11:49 AM      Medications: none    Risk calculator: The 10-year ASCVD risk score (Patricia CARDONA, et al., 2019) is: 0.3%

## 2025-05-20 NOTE — PROGRESS NOTES
SUBJECTIVE:     CC:     HPI:   Brooke presents today with the following:    ASSESSMENT & PLAN by Problem:     Problem   Low Iron Stores    Chronic, ongoing.  Iron 44 (5/2025).  Currently taking 2 over-the-counter iron supplements daily.  Recommend increasing to 3 times daily.     Breathing Difficulty    Chronic, uncontrolled.  History of nasal septal surgery.  Having breathing issues again.  Asking for referral to ENT.     Elevated Blood Pressure Reading    Resolved.     Weight Gain    Improving.  Patient has lost weight over the last year.     Dub (Dysfunctional Uterine Bleeding)    Chronic, controlled.   Managed by OB/GYN.     Wrist Lump, Right    Chronic, uncontrolled.  Bump dorsum right wrist starting around 2024.  Worsening, enlarging.  Painful.  Referring to orthopedics per patient request     Overweight    Chronic, uncontrolled.  Improving.  Increase plant-based foods, decrease animal-based foods.  Increase daily activity, aiming for 30-45 minutes brisk exercise daily.     Dyslipidemia    Chronic, stable.  Diet controlled.     Vitamin D Deficiency    Chronic, uncontrolled.  Supplementation: Recommend starting 2000 IU daily.     Post Partum Depression    Chronic, ongoing.  Didn't notice a difference on sertraline 50mg.  Will try venlafaxine.          Acute Exacerbation of Chronic Low Back Pain (Resolved)    Resolved.     Skin Lesion (Resolved)    Resolved.  Evaluated by dermatology.     Chronic Pain of Left Knee (Resolved)    Resolved.           Mammogram: Ordered by GYN, already done within the last year.    Repeat labs September 2025.    Return in about 2 months (around 7/20/2025) for lab discussion, venlafaxine.        HPI:     Problem List Items Addressed This Visit       RESOLVED: Acute exacerbation of chronic low back pain - Primary      Interval history 12/2023:  Chronic, uncontrolled.  Had issues prior to pregnancy.  Has issues bending over --> hard to stand back up.  Often needs assistance getting  upright.  Denies radiating pain, saddle anesthesia, bowel/bladder changes, extremity weakness.  Referring to PT.         Relevant Medications    venlafaxine XR (EFFEXOR XR) 37.5 MG CAPSULE SR 24 HR    Breathing difficulty    Relevant Orders    Referral to ENT    RESOLVED: Chronic pain of left knee      Interval history 11/2023:  Chronic, uncontrolled.  Pain in the posterior left knee for the past 6 months.  Worse with ambulation.  Denies any swelling or rashes  Negative US.  Referring to orthopedics.         Relevant Medications    venlafaxine XR (EFFEXOR XR) 37.5 MG CAPSULE SR 24 HR    DUB (dysfunctional uterine bleeding)    Chronic, controlled.   Managed by OB/GYN.  2/9/2024, D&C.  Bleeding stopped.  OB/GYN put her on progesterone 100mg day 1-10 of the month.  Estrogen caused a huge increase in her BP apparently.         Dyslipidemia    Chronic, stable.  Diet controlled.    Latest Labs:   Lab Results   Component Value Date/Time    CHOLSTRLTOT 147 05/10/2025 11:49 AM    LDL 85 05/10/2025 11:49 AM    HDL 48 05/10/2025 11:49 AM    TRIGLYCERIDE 69 05/10/2025 11:49 AM      Medications: none    Risk calculator: The 10-year ASCVD risk score (Patricia DK, et al., 2019) is: 0.3%          Elevated blood pressure reading    Low iron stores    Chronic, ongoing.  Iron 44 (5/2025).  Currently taking 2 over-the-counter iron supplements daily.  Recommend increasing to 3 times daily.         Relevant Orders    IRON/TOTAL IRON BIND    FERRITIN    Overweight    Chronic, uncontrolled.  Improving.    5/2025: 169#  3/2024: 184#    Interval history 11/2023:  Chronic, uncontrolled.  Delivered 7 months ago (11/25/22).  Weight after delivery: around 75kg  Current weight: 80kg  Eating habits: eats when she has time; has a hard time scheduling her meals.  Appetite fluctuates; feels like her stomach isn't processing foods quickly enough.  Feels like she needs to eat but feels like her stomach is still full  Doesn't sleep enough.  Bowels: every  "other day; normal for her.  Referred to [facility].  Component      Latest Ref Rng 2023   TSH      0.380 - 5.330 uIU/mL 1.700             Post partum depression (Chronic)    Chronic, ongoing.  Didn't notice a difference on sertraline 50mg.  Will try venlafaxine.    Interval history 2023:  Acute, uncontrolled.  Long labor, didn't progress.  Had to do a .  OB/GYN started her on zoloft 25mg daily (started 2023).  Finds when the baby cries, she cries.  Hands shake when  feeds the baby.  Was crying when he was circumcised - couldn't stop.  \"I have this anxiety inside of me\"  Had COVID during pregnancy.  Lost her parents when she was a kid.  Had her first therapy session online yesterday.  Will make another therapy appointment.    2023 PHQ-9: 18      2023:  Patient started sertraline 2023.  She has not noticed any significant difference.  Patient still feels very overwhelmed with everything, care of her home, grocery shopping, the thought of going to work.  She is having difficulty concentrating/focusing.  Patient denies any SI/HI, specifically denies any thoughts of hurting her baby.  We will continue patient on 25 mg for the next 2-3 weeks, increasing to 50 if she feels like she has not noticed any change.  Patient to follow-up 3/1/2023, sooner as needed.  [place] paperwork filled out in clinic today.      2023:  PHQ-9 today: 15  Has a rough few days due to lack of sleep (son didn't sleep well and was very fussy).  Doesn't feel worse but doesn't feel better.   Has been on sertraline 25mg         Relevant Medications    venlafaxine XR (EFFEXOR XR) 37.5 MG CAPSULE SR 24 HR    RESOLVED: Skin lesion    Vitamin D deficiency    Relevant Orders    VITAMIN D,25 HYDROXY (DEFICIENCY)    Weight gain        Interval history 2024:  Chronic, uncontrolled.  Reports the following weights:  65kg x 6 years  6 months before pregnancy, 72kg  92kg with pregnancy  75kg after pregnancy  81kg " "currently  Feels that she has changed her eating habits but is only gaining weight.  Very frustrated with her inability to lose weight.         Wrist lump, right    Relevant Orders    Referral to Orthopedics     Other Visit Diagnoses         Encounter for screening mammogram for malignant neoplasm of breast          Fatigue, unspecified type        Relevant Orders    CORTISOL - AM                   ROS:  Review of Systems   Constitutional:  Negative for chills and fever.   Respiratory:  Negative for shortness of breath.    Cardiovascular:  Negative for chest pain.       OBJECTIVE:     Exam:  /72 (BP Location: Left arm, Patient Position: Sitting, BP Cuff Size: Adult)   Pulse 93   Temp 37 °C (98.6 °F) (Temporal)   Resp 16   Ht 1.651 m (5' 5\")   Wt 78.5 kg (173 lb 2 oz)   LMP 04/13/2025   SpO2 97%   BMI 28.81 kg/m²  Body mass index is 28.81 kg/m².    Physical Exam  Vitals reviewed.   Constitutional:       General: She is not in acute distress.     Appearance: Normal appearance.   Pulmonary:      Effort: Pulmonary effort is normal.   Neurological:      General: No focal deficit present.      Mental Status: She is alert.   Psychiatric:         Mood and Affect: Mood normal.         Behavior: Behavior normal.         Judgment: Judgment normal.                   Please note that this dictation was created using voice recognition software. I have made every reasonable attempt to correct obvious errors, but I expect that there are errors of grammar and possibly content that I did not discover before finalizing the note.    "

## 2025-05-20 NOTE — ASSESSMENT & PLAN NOTE
"Chronic, ongoing.  Didn't notice a difference on sertraline 50mg.  Will try venlafaxine.    Interval history 2023:  Acute, uncontrolled.  Long labor, didn't progress.  Had to do a .  OB/GYN started her on zoloft 25mg daily (started 2023).  Finds when the baby cries, she cries.  Hands shake when  feeds the baby.  Was crying when he was circumcised - couldn't stop.  \"I have this anxiety inside of me\"  Had COVID during pregnancy.  Lost her parents when she was a kid.  Had her first therapy session online yesterday.  Will make another therapy appointment.      2023 PHQ-9: 18      2023:  Patient started sertraline 2023.  She has not noticed any significant difference.  Patient still feels very overwhelmed with everything, care of her home, grocery shopping, the thought of going to work.  She is having difficulty concentrating/focusing.  Patient denies any SI/HI, specifically denies any thoughts of hurting her baby.  We will continue patient on 25 mg for the next 2-3 weeks, increasing to 50 if she feels like she has not noticed any change.  Patient to follow-up 3/1/2023, sooner as needed.  [facility] paperwork filled out in clinic today.      2023:  PHQ-9 today: 15  Has a rough few days due to lack of sleep (son didn't sleep well and was very fussy).  Doesn't feel worse but doesn't feel better.   Has been on sertraline 25mg  "

## 2025-05-20 NOTE — ASSESSMENT & PLAN NOTE
Chronic, uncontrolled.  Improving.    5/2025: 169#  3/2024: 184#    Interval history 11/2023:  Chronic, uncontrolled.  Delivered 7 months ago (11/25/22).  Weight after delivery: around 75kg  Current weight: 80kg  Eating habits: eats when she has time; has a hard time scheduling her meals.  Appetite fluctuates; feels like her stomach isn't processing foods quickly enough.  Feels like she needs to eat but feels like her stomach is still full  Doesn't sleep enough.  Bowels: every other day; normal for her.  Referred to RD.  Component      Latest Ref Rng 9/25/2023   TSH      0.380 - 5.330 uIU/mL 1.700

## 2025-05-23 ENCOUNTER — RESULTS FOLLOW-UP (OUTPATIENT)
Dept: MEDICAL GROUP | Facility: PHYSICIAN GROUP | Age: 40
End: 2025-05-23

## 2025-05-26 NOTE — Clinical Note
REFERRAL APPROVAL NOTICE         Sent on May 26, 2025                   Brooke Young  [address]  [facility] [place] [address]                   Dear Ms. Young,    After a careful review of the medical information and benefit coverage, [facility] has processed your referral. See below for additional details.    If applicable, you must be actively enrolled with your insurance for coverage of the authorized service. If you have any questions regarding your coverage, please contact your insurance directly.    REFERRAL INFORMATION   Referral #:  [id number]  Referred-To Department    Referred-By Provider:  Orthopedics    Mana Richmond P.A.-C.   [facility]      [address]  [place] [id number] [phone] [address] [facility] [address]  [phone]    Referral Start Date:  05/20/2025  Referral End Date:   05/20/2026             SCHEDULING  If you do not already have an appointment, please call [phone] to make an appointment.     MORE INFORMATION  If you do not already have a [employer] account, sign up at: [email or web address]  You can access your medical information, make appointments, see lab results, billing information, and more.  If you have questions regarding this referral, please contact  the [facility] Referrals department at:             [phone]. Monday - Friday 8:00AM - 5:00PM.     Sincerely,    [facility]

## 2025-05-26 NOTE — Clinical Note
REFERRAL APPROVAL NOTICE         Sent on May 26, 2025                   Brooke Young  [address]  [place] [address]                   Dear Ms. Young,    After a careful review of the medical information and benefit coverage, [facility] has processed your referral. See below for additional details.    If applicable, you must be actively enrolled with your insurance for coverage of the authorized service. If you have any questions regarding your coverage, please contact your insurance directly.    REFERRAL INFORMATION   Referral #:  [id number]  Referred-To Provider    Referred-By Provider:  Otolaryngology    Mana Richmond P.A.-C.   [facility]      [address]  [place] [id number] [phone] [address] [place] [address]  [phone]    Referral Start Date:  05/20/2025  Referral End Date:   05/20/2026             SCHEDULING  If you do not already have an appointment, please call [phone] to make an appointment.     MORE INFORMATION  If you do not already have a [employer] account, sign up at: [email or web address]  You can access your medical information, make appointments, see lab results, billing information, and more.  If you have questions regarding this referral, please contact  the [facility] Referrals department at:             [phone]. Monday - Friday 8:00AM - 5:00PM.     Sincerely,    [facility]

## 2025-06-24 ENCOUNTER — HOSPITAL ENCOUNTER (OUTPATIENT)
Dept: LAB | Facility: MEDICAL CENTER | Age: 40
End: 2025-06-24
Attending: PHYSICIAN ASSISTANT
Payer: COMMERCIAL

## 2025-06-24 DIAGNOSIS — R53.83 FATIGUE, UNSPECIFIED TYPE: ICD-10-CM

## 2025-06-24 DIAGNOSIS — E55.9 VITAMIN D DEFICIENCY: ICD-10-CM

## 2025-06-24 DIAGNOSIS — R39.9 LOWER URINARY TRACT SYMPTOMS (LUTS): ICD-10-CM

## 2025-06-24 DIAGNOSIS — R53.83 FATIGUE, UNSPECIFIED TYPE: Primary | ICD-10-CM

## 2025-06-24 DIAGNOSIS — R79.0 LOW IRON STORES: ICD-10-CM

## 2025-06-24 LAB
25(OH)D3 SERPL-MCNC: 32 NG/ML (ref 30–100)
ALBUMIN SERPL BCP-MCNC: 4.7 G/DL (ref 3.2–4.9)
ALBUMIN/GLOB SERPL: 1.5 G/DL
ALP SERPL-CCNC: 70 U/L (ref 30–99)
ALT SERPL-CCNC: 30 U/L (ref 2–50)
ANION GAP SERPL CALC-SCNC: 10 MMOL/L (ref 7–16)
APPEARANCE UR: CLEAR
AST SERPL-CCNC: 24 U/L (ref 12–45)
BASOPHILS # BLD AUTO: 0.4 % (ref 0–1.8)
BASOPHILS # BLD: 0.03 K/UL (ref 0–0.12)
BILIRUB SERPL-MCNC: 0.5 MG/DL (ref 0.1–1.5)
BILIRUB UR QL STRIP.AUTO: NEGATIVE
BUN SERPL-MCNC: 19 MG/DL (ref 8–22)
CALCIUM ALBUM COR SERPL-MCNC: 9.3 MG/DL (ref 8.5–10.5)
CALCIUM SERPL-MCNC: 9.9 MG/DL (ref 8.5–10.5)
CHLORIDE SERPL-SCNC: 100 MMOL/L (ref 96–112)
CO2 SERPL-SCNC: 26 MMOL/L (ref 20–33)
COLOR UR: YELLOW
CREAT SERPL-MCNC: 0.79 MG/DL (ref 0.5–1.4)
CRP SERPL HS-MCNC: 0.35 MG/DL (ref 0–0.75)
EOSINOPHIL # BLD AUTO: 0.32 K/UL (ref 0–0.51)
EOSINOPHIL NFR BLD: 4.4 % (ref 0–6.9)
ERYTHROCYTE [DISTWIDTH] IN BLOOD BY AUTOMATED COUNT: 58.8 FL (ref 35.9–50)
FERRITIN SERPL-MCNC: 42.8 NG/ML (ref 10–291)
GFR SERPLBLD CREATININE-BSD FMLA CKD-EPI: 97 ML/MIN/1.73 M 2
GLOBULIN SER CALC-MCNC: 3.1 G/DL (ref 1.9–3.5)
GLUCOSE SERPL-MCNC: 88 MG/DL (ref 65–99)
GLUCOSE UR STRIP.AUTO-MCNC: NEGATIVE MG/DL
HCT VFR BLD AUTO: 43.3 % (ref 37–47)
HGB BLD-MCNC: 14.1 G/DL (ref 12–16)
IMM GRANULOCYTES # BLD AUTO: 0.04 K/UL (ref 0–0.11)
IMM GRANULOCYTES NFR BLD AUTO: 0.6 % (ref 0–0.9)
IRON SATN MFR SERPL: 21 % (ref 15–55)
IRON SERPL-MCNC: 73 UG/DL (ref 40–170)
KETONES UR STRIP.AUTO-MCNC: NEGATIVE MG/DL
LEUKOCYTE ESTERASE UR QL STRIP.AUTO: NEGATIVE
LYMPHOCYTES # BLD AUTO: 1.44 K/UL (ref 1–4.8)
LYMPHOCYTES NFR BLD: 20 % (ref 22–41)
MCH RBC QN AUTO: 27.8 PG (ref 27–33)
MCHC RBC AUTO-ENTMCNC: 32.6 G/DL (ref 32.2–35.5)
MCV RBC AUTO: 85.2 FL (ref 81.4–97.8)
MICRO URNS: NORMAL
MONOCYTES # BLD AUTO: 0.75 K/UL (ref 0–0.85)
MONOCYTES NFR BLD AUTO: 10.4 % (ref 0–13.4)
NEUTROPHILS # BLD AUTO: 4.62 K/UL (ref 1.82–7.42)
NEUTROPHILS NFR BLD: 64.2 % (ref 44–72)
NITRITE UR QL STRIP.AUTO: NEGATIVE
NRBC # BLD AUTO: 0 K/UL
NRBC BLD-RTO: 0 /100 WBC (ref 0–0.2)
PH UR STRIP.AUTO: 6 [PH] (ref 5–8)
PLATELET # BLD AUTO: 305 K/UL (ref 164–446)
PMV BLD AUTO: 11.3 FL (ref 9–12.9)
POTASSIUM SERPL-SCNC: 4.8 MMOL/L (ref 3.6–5.5)
PROT SERPL-MCNC: 7.8 G/DL (ref 6–8.2)
PROT UR QL STRIP: NEGATIVE MG/DL
RBC # BLD AUTO: 5.08 M/UL (ref 4.2–5.4)
RBC UR QL AUTO: NEGATIVE
SODIUM SERPL-SCNC: 136 MMOL/L (ref 135–145)
SP GR UR STRIP.AUTO: 1.01
TIBC SERPL-MCNC: 343 UG/DL (ref 250–450)
UIBC SERPL-MCNC: 270 UG/DL (ref 110–370)
UROBILINOGEN UR STRIP.AUTO-MCNC: 0.2 EU/DL
WBC # BLD AUTO: 7.2 K/UL (ref 4.8–10.8)

## 2025-06-24 PROCEDURE — 82728 ASSAY OF FERRITIN: CPT

## 2025-06-24 PROCEDURE — 81003 URINALYSIS AUTO W/O SCOPE: CPT

## 2025-06-24 PROCEDURE — 36415 COLL VENOUS BLD VENIPUNCTURE: CPT

## 2025-06-24 PROCEDURE — 86140 C-REACTIVE PROTEIN: CPT

## 2025-06-24 PROCEDURE — 82306 VITAMIN D 25 HYDROXY: CPT

## 2025-06-24 PROCEDURE — 80053 COMPREHEN METABOLIC PANEL: CPT

## 2025-06-24 PROCEDURE — 83550 IRON BINDING TEST: CPT

## 2025-06-24 PROCEDURE — 85025 COMPLETE CBC W/AUTO DIFF WBC: CPT

## 2025-06-24 PROCEDURE — 83540 ASSAY OF IRON: CPT

## 2025-06-25 ENCOUNTER — APPOINTMENT (OUTPATIENT)
Dept: MEDICAL GROUP | Facility: PHYSICIAN GROUP | Age: 40
End: 2025-06-25
Payer: COMMERCIAL

## 2025-06-25 ENCOUNTER — RESULTS FOLLOW-UP (OUTPATIENT)
Dept: MEDICAL GROUP | Facility: PHYSICIAN GROUP | Age: 40
End: 2025-06-25

## 2025-07-21 PROBLEM — R63.5 WEIGHT GAIN: Status: RESOLVED | Noted: 2024-02-22 | Resolved: 2025-07-21

## 2025-07-24 ENCOUNTER — APPOINTMENT (OUTPATIENT)
Dept: MEDICAL GROUP | Facility: PHYSICIAN GROUP | Age: 40
End: 2025-07-24
Payer: COMMERCIAL

## 2025-07-24 ENCOUNTER — TELEMEDICINE (OUTPATIENT)
Dept: MEDICAL GROUP | Facility: PHYSICIAN GROUP | Age: 40
End: 2025-07-24
Payer: COMMERCIAL

## 2025-07-24 VITALS — WEIGHT: 173 LBS | BODY MASS INDEX: 28.82 KG/M2 | HEIGHT: 65 IN

## 2025-07-24 DIAGNOSIS — R06.89 BREATHING DIFFICULTY: ICD-10-CM

## 2025-07-24 DIAGNOSIS — R79.0 LOW IRON STORES: ICD-10-CM

## 2025-07-24 DIAGNOSIS — E55.9 VITAMIN D DEFICIENCY: Primary | ICD-10-CM

## 2025-07-24 DIAGNOSIS — R22.31 WRIST LUMP, RIGHT: ICD-10-CM

## 2025-07-24 DIAGNOSIS — F32.A DEPRESSION, UNSPECIFIED DEPRESSION TYPE: ICD-10-CM

## 2025-07-24 DIAGNOSIS — L81.1 MELASMA: ICD-10-CM

## 2025-07-24 ASSESSMENT — PATIENT HEALTH QUESTIONNAIRE - PHQ9: CLINICAL INTERPRETATION OF PHQ2 SCORE: 0

## 2025-07-24 ASSESSMENT — ENCOUNTER SYMPTOMS
FEVER: 0
FEVER: 0
CHILLS: 0
CHILLS: 0
SHORTNESS OF BREATH: 0
SHORTNESS OF BREATH: 0

## 2025-07-24 ASSESSMENT — FIBROSIS 4 INDEX: FIB4 SCORE: 0.57

## 2025-07-24 NOTE — ASSESSMENT & PLAN NOTE
"Chronic, ongoing.  Didn't notice a difference on sertraline 50mg.  Will try venlafaxine.    Interval history 2023:  Acute, uncontrolled.  Long labor, didn't progress.  Had to do a .  OB/GYN started her on zoloft 25mg daily (started 2023).  Finds when the baby cries, she cries.  Hands shake when  feeds the baby.  Was crying when he was circumcised - couldn't stop.  \"I have this anxiety inside of me\"  Had COVID during pregnancy.  Lost her parents when she was a kid.  Had her first therapy session online yesterday.  Will make another therapy appointment.      2023 PHQ-9: 18      2023:  Patient started sertraline 2023.  She has not noticed any significant difference.  Patient still feels very overwhelmed with everything, care of her home, grocery shopping, the thought of going to work.  She is having difficulty concentrating/focusing.  Patient denies any SI/HI, specifically denies any thoughts of hurting her baby.  We will continue patient on 25 mg for the next 2-3 weeks, increasing to 50 if she feels like she has not noticed any change.  Patient to follow-up 3/1/2023, sooner as needed.  [facility] paperwork filled out in clinic today.      2023:  PHQ-9 today: 15  Has a rough few days due to lack of sleep (son didn't sleep well and was very fussy).  Doesn't feel worse but doesn't feel better.   Has been on sertraline 25mg  "

## 2025-07-24 NOTE — ASSESSMENT & PLAN NOTE
Chronic, ongoing.  Feels less irritated.  Less brain fog.  Not over thinking as much.  Tolerating/continue venlafaxine 37.5mg daily.

## 2025-07-24 NOTE — PROGRESS NOTES
"Virtual Visit:  This visit was conducted via Zoom using secure and encrypted videoconferencing technology.   The patient was in their home in the state of [place].    The patient's identity was confirmed and verbal consent was obtained for this virtual visit.   SUBJECTIVE:     CC: Follow-up chronic issues/venlafaxine    HPI:   Brooke presents today with the following:    ASSESSMENT & PLAN by Problem:     Problem   [employer]    Chronic, ongoing.  Iron 73 (2025); 44 (2025).  Tolerating/continue 3 over-the-counter iron supplements daily.       Overweight    Chronic, uncontrolled.  Improving.  Increase plant-based foods, decrease animal-based foods.  Increase daily activity, aiming for 30-45 minutes brisk exercise daily.      Depression    Chronic, ongoing.  Didn't notice a difference on sertraline 50mg.  Will try venlafaxine.                ***    {Vitamin D looks better,?  Are you taking 2000 IU daily}      {Iron looks better from 2025 ? are you taking 3 supplements daily}     {Venlafaxine}     {Did you get in the ENT and Ortho}     Weight***     Mammogram: ***Ordered 2025.     Follow-up 2026, sooner as needed.***    No follow-ups on file.        Healthcare Maintenance:  ***    HPI:     Problem List Items Addressed This Visit       Depression    Chronic, ongoing.  Didn't notice a difference on sertraline 50mg.  Will try venlafaxine.    Interval history 2023:  Acute, uncontrolled.  Long labor, didn't progress.  Had to do a .  OB/GYN started her on zoloft 25mg daily (started 2023).  Finds when the baby cries, she cries.  Hands shake when  feeds the baby.  Was crying when he was circumcised - couldn't stop.  \"I have this anxiety inside of me\"  Had COVID during pregnancy.  Lost her parents when she was a kid.  Had her first therapy session online yesterday.  Will make another therapy appointment.    2023 PHQ-9: 18      2023:  Patient started sertraline 2023.  She " has not noticed any significant difference.  Patient still feels very overwhelmed with everything, care of her home, grocery shopping, the thought of going to work.  She is having difficulty concentrating/focusing.  Patient denies any SI/HI, specifically denies any thoughts of hurting her baby.  We will continue patient on 25 mg for the next 2-3 weeks, increasing to 50 if she feels like she has not noticed any change.  Patient to follow-up 3/1/2023, sooner as needed.  [place] paperwork filled out in clinic today.      2/9/2023:  PHQ-9 today: 15  Has a rough few days due to lack of sleep (son didn't sleep well and was very fussy).  Doesn't feel worse but doesn't feel better.   Has been on sertraline 25mg         Low iron stores - Primary    Chronic, ongoing.  Iron 44 (5/2025).  Currently taking 2 over-the-counter iron supplements daily.  Recommend increasing to 3 times daily.         Overweight    Chronic, uncontrolled.  Improving.    7/2025:  5/2025: 169#  3/2024: 184#    Interval history 11/2023:  Chronic, uncontrolled.  Delivered 7 months ago (11/25/22).  Weight after delivery: around 75kg  Current weight: 80kg  Eating habits: eats when she has time; has a hard time scheduling her meals.  Appetite fluctuates; feels like her stomach isn't processing foods quickly enough.  Feels like she needs to eat but feels like her stomach is still full  Doesn't sleep enough.  Bowels: every other day; normal for her.  Referred to RD.  Component      Latest Ref Rng 9/25/2023   TSH      0.380 - 5.330 uIU/mL 1.700                       ROS:  Review of Systems   Constitutional:  Negative for chills and fever.   Respiratory:  Negative for shortness of breath.    Cardiovascular:  Negative for chest pain.       OBJECTIVE:     Exam:  There were no vitals taken for this visit. There is no height or weight on file to calculate BMI.    Physical Exam  Vitals reviewed.   Constitutional:       General: She is not in acute distress.      Appearance: Normal appearance.   Pulmonary:      Effort: Pulmonary effort is normal.   Neurological:      General: No focal deficit present.      Mental Status: She is alert.   Psychiatric:         Mood and Affect: Mood normal.         Behavior: Behavior normal.         Judgment: Judgment normal.           CHART REVIEW:     Labs:                  I spent a total of *** minutes with record review, exam, communication with the patient, communication with other providers, and documentation of this encounter.      Please note that this dictation was created using voice recognition software. I have made every reasonable attempt to correct obvious errors, but I expect that there are errors of grammar and possibly content that I did not discover before finalizing the note.zing the note.

## 2025-07-24 NOTE — ASSESSMENT & PLAN NOTE
Chronic, uncontrolled.  Improving.    7/2025:  5/2025: 169#  3/2024: 184#    Interval history 11/2023:  Chronic, uncontrolled.  Delivered 7 months ago (11/25/22).  Weight after delivery: around 75kg  Current weight: 80kg  Eating habits: eats when she has time; has a hard time scheduling her meals.  Appetite fluctuates; feels like her stomach isn't processing foods quickly enough.  Feels like she needs to eat but feels like her stomach is still full  Doesn't sleep enough.  Bowels: every other day; normal for her.  Referred to RD.  Component      Latest Ref Rng 9/25/2023   TSH      0.380 - 5.330 uIU/mL 1.700

## 2025-07-24 NOTE — PROGRESS NOTES
Virtual Visit:  This visit was conducted via Zoom using secure and encrypted videoconferencing technology.   The patient was in their home in the [facility].    The patient's identity was confirmed and verbal consent was obtained for this virtual visit.   SUBJECTIVE:     CC: lab follow up     HPI:   Brooke presents today with the following:    ASSESSMENT & PLAN by Problem:     Problem   [employer]    Chronic, ongoing.  44 (5/2025).  Stopped taking iron 2 weeks ago, 3/day (iron 73, 6/2025).  Continue 3 tabs/day.     Breathing Difficulty    Chronic, uncontrolled.  History of nasal septal surgery.  Having breathing issues again.  Needs to schedule with ENT.     Wrist Lump, Right    Chronic, uncontrolled.  Bump dorsum right wrist starting around 2024.  Worsening, enlarging.  Painful.    Still needs to schedule with orthopedics.     Overweight    Chronic, uncontrolled.  Improving.  Increase plant-based foods, decrease animal-based foods.  Increase daily activity, aiming for 30-45 minutes brisk exercise daily.      Vitamin D Deficiency    Chronic, stable.  Tolerating 6000IU. (23-->32, 6/2025)  Increase to 8000IU daily.     Depression    Chronic, ongoing.  Tolerating/continue venlafaxine 37.5mg daily.       Melasma    Chronic, ongoing.  Has been using tretinoin 0.1% and hydrocortisone 0.5% from dermatology.  She will send me a copy of what she was taking through [employer].       Unremarkable labs 6/24/2025.    Mammogram: Ordered 7/24/2025.     Repeat labs around February 2026.  Follow-up July 2026, sooner as needed.    Return in about 1 year (around 7/24/2026), or if symptoms worsen or fail to improve.      HPI:     Problem List Items Addressed This Visit       Breathing difficulty    Depression    Chronic, ongoing.  Feels less irritated.  Less brain fog.  Not over thinking as much.  Tolerating/continue venlafaxine 37.5mg daily.         Low iron stores    Relevant Orders    IRON/TOTAL IRON BIND    Melasma     "Vitamin D deficiency - Primary    Relevant Orders    VITAMIN D,25 HYDROXY (DEFICIENCY)    Wrist lump, right              ROS:  Review of Systems   Constitutional:  Negative for chills and fever.   Respiratory:  Negative for shortness of breath.    Cardiovascular:  Negative for chest pain.       OBJECTIVE:     Exam:  Ht 1.651 m (5' 5\")   Wt 78.5 kg (173 lb) Comment: per pt.  BMI 28.79 kg/m²  Body mass index is 28.79 kg/m².    Physical Exam  Vitals reviewed.   Constitutional:       General: She is not in acute distress.     Appearance: Normal appearance.   Pulmonary:      Effort: Pulmonary effort is normal.   Neurological:      General: No focal deficit present.      Mental Status: She is alert.   Psychiatric:         Mood and Affect: Mood normal.         Behavior: Behavior normal.         Judgment: Judgment normal.         Please note that this dictation was created using voice recognition software. I have made every reasonable attempt to correct obvious errors, but I expect that there are errors of grammar and possibly content that I did not discover before finalizing the note.zing the note.  "

## 2025-08-18 ENCOUNTER — TELEPHONE (OUTPATIENT)
Dept: MEDICAL GROUP | Facility: PHYSICIAN GROUP | Age: 40
End: 2025-08-18
Payer: COMMERCIAL

## 2025-08-19 DIAGNOSIS — L81.1 MELASMA: Primary | ICD-10-CM

## 2025-08-19 RX ORDER — TRETINOIN 0.25 MG/G
CREAM TOPICAL
Qty: 20 G | Refills: 1 | Status: SHIPPED | OUTPATIENT
Start: 2025-08-19

## 2025-08-19 RX ORDER — HYDROQUINONE 40 MG/G
CREAM TOPICAL
Qty: 28.35 G | Refills: 1 | Status: SHIPPED | OUTPATIENT
Start: 2025-08-19

## 2025-08-20 ENCOUNTER — TELEPHONE (OUTPATIENT)
Dept: MEDICAL GROUP | Facility: PHYSICIAN GROUP | Age: 40
End: 2025-08-20
Payer: COMMERCIAL

## (undated) DEVICE — SUTURE 2-0 VICRYL PLUS CT-1 36 (36PK/BX)"

## (undated) DEVICE — WATER IRRIGATION STERILE 1000ML (12EA/CA)

## (undated) DEVICE — TUBING CLEARLINK DUO-VENT - C-FLO (48EA/CA)

## (undated) DEVICE — SUTURE 0 VICRYL PLUS CT-1 - 36 INCH (36/BX)

## (undated) DEVICE — CHLORAPREP 26 ML APPLICATOR - ORANGE TINT(25/CA)

## (undated) DEVICE — SODIUM CHL IRRIGATION 0.9% 1000ML (12EA/CA)

## (undated) DEVICE — TUBE CONNECTING SUCTION - CLEAR PLASTIC STERILE 72 IN (50EA/CA)

## (undated) DEVICE — SET LEADWIRE 5 LEAD BEDSIDE DISPOSABLE ECG (1SET OF 5/EA)

## (undated) DEVICE — GLOVE BIOGEL SZ 8.5 SURGICAL PF LTX - (50PR/BX 4BX/CA)

## (undated) DEVICE — VACURETTE 10MM CURVED 10/PKG

## (undated) DEVICE — PAD SANITARY 11IN MAXI IND WRAPPED  (12EA/PK 24PK/CA)

## (undated) DEVICE — PACK C-SECTION (2EA/CA)

## (undated) DEVICE — ELECTRODE DUAL RETURN W/ CORD - (50/PK)

## (undated) DEVICE — Device

## (undated) DEVICE — CLOSURE SKIN STRIP 1/2 X 4 IN - (STERI STRIP) (50/BX 4BX/CA)

## (undated) DEVICE — MASK OXYGEN VNYL ADLT MED CONC WITH 7 FOOT TUBING  - (50EA/CA)

## (undated) DEVICE — BRIEF STRETCH MATERNITY M/L - FITS 20-60IN (5EA/BG 20BG/CA)

## (undated) DEVICE — SLEEVE VASO CALF MED - (10PR/CA)

## (undated) DEVICE — KIT  I.V. START (100EA/CA)

## (undated) DEVICE — TUBE SUCTION YANKAUER  1/4 X 6FT (20EA/CA)"

## (undated) DEVICE — TUBING D & E COLLECTION SET (50EA/PK)

## (undated) DEVICE — CANISTER SUCTION RIGID RED 1500CC (40EA/CA)

## (undated) DEVICE — SUTURE 4-0 VICRYL PLUSFS-1 - 27 INCH (36/BX)

## (undated) DEVICE — NEEDLE SPINAL NON-SAFETY 22 GA X 3 (25EA/BX)"

## (undated) DEVICE — SUCTION INSTRUMENT YANKAUER BULBOUS TIP W/O VENT (50EA/CA)

## (undated) DEVICE — CANISTER SUCTION 3000ML MECHANICAL FILTER AUTO SHUTOFF MEDI-VAC NONSTERILE LF DISP  (40EA/CA)

## (undated) DEVICE — TRAY SPINAL ANESTHESIA NON-SAFETY (10/CA)

## (undated) DEVICE — CANNULA O2 COMFORT SOFT EAR ADULT 7 FT TUBING (50/CA)

## (undated) DEVICE — STERI STRIP COMPOUND BENZOIN - TINCTURE 0.6ML WITH APPLICATOR (40EA/BX)

## (undated) DEVICE — CATHETER IV NON-SAFETY 18 GA X 1 1/4 (50/BX 4BX/CA)

## (undated) DEVICE — SUTURE 0 36IN PDS + VIO CT-1 (36PK/BX)

## (undated) DEVICE — TOWEL STOP TIMEOUT SAFETY FLAG (40EA/CA)

## (undated) DEVICE — SET EXTENSION WITH 2 PORTS (48EA/CA) ***PART #2C8610 IS A SUBSTITUTE*****

## (undated) DEVICE — DRESSING POST OP BORDER 4 X 10 (5EA/BX)

## (undated) DEVICE — GOWN WARMING STANDARD FLEX - (30/CA)

## (undated) DEVICE — SLEEVE, SEQUENTIAL CALF REG

## (undated) DEVICE — SENSOR OXIMETER ADULT SPO2 RD SET (20EA/BX)

## (undated) DEVICE — KIT D & C COLLECTION (10EA/PK)

## (undated) DEVICE — GOWN SURGEONS X-LARGE - DISP. (30/CA)

## (undated) DEVICE — LACTATED RINGERS INJ 1000 ML - (14EA/CA 60CA/PF)

## (undated) DEVICE — TAPE CLOTH MEDIPORE 6 INCH - (12RL/CA)

## (undated) DEVICE — HEAD HOLDER JUNIOR/ADULT

## (undated) DEVICE — SUTURE 3-0 VICRYL PLUS CT-1 - 36 INCH (36/BX)